# Patient Record
Sex: MALE | Race: WHITE | NOT HISPANIC OR LATINO | Employment: OTHER | URBAN - NONMETROPOLITAN AREA
[De-identification: names, ages, dates, MRNs, and addresses within clinical notes are randomized per-mention and may not be internally consistent; named-entity substitution may affect disease eponyms.]

---

## 2017-01-12 ENCOUNTER — OFFICE VISIT (OUTPATIENT)
Dept: PSYCHIATRY | Facility: CLINIC | Age: 47
End: 2017-01-12

## 2017-01-12 VITALS
WEIGHT: 170 LBS | SYSTOLIC BLOOD PRESSURE: 138 MMHG | BODY MASS INDEX: 25.18 KG/M2 | HEIGHT: 69 IN | DIASTOLIC BLOOD PRESSURE: 91 MMHG | HEART RATE: 55 BPM

## 2017-01-12 DIAGNOSIS — F33.1 MAJOR DEPRESSIVE DISORDER, RECURRENT EPISODE, MODERATE (HCC): Primary | ICD-10-CM

## 2017-01-12 DIAGNOSIS — F43.10 POST TRAUMATIC STRESS DISORDER (PTSD): ICD-10-CM

## 2017-01-12 PROCEDURE — 99213 OFFICE O/P EST LOW 20 MIN: CPT | Performed by: NURSE PRACTITIONER

## 2017-01-12 RX ORDER — PRAZOSIN HYDROCHLORIDE 2 MG/1
2 CAPSULE ORAL NIGHTLY
Qty: 30 CAPSULE | Refills: 1 | Status: SHIPPED | OUTPATIENT
Start: 2017-01-12 | End: 2017-02-16 | Stop reason: SDUPTHER

## 2017-01-12 RX ORDER — OXCARBAZEPINE 300 MG/1
300 TABLET, FILM COATED ORAL 2 TIMES DAILY
Refills: 6 | COMMUNITY
Start: 2016-12-13 | End: 2017-04-04 | Stop reason: SDUPTHER

## 2017-01-12 RX ORDER — BUSPIRONE HYDROCHLORIDE 10 MG/1
10 TABLET ORAL 3 TIMES DAILY
Qty: 60 TABLET | Refills: 0 | Status: SHIPPED | OUTPATIENT
Start: 2017-01-12 | End: 2017-02-16 | Stop reason: SDUPTHER

## 2017-01-12 RX ORDER — OLANZAPINE 20 MG/1
20 TABLET ORAL NIGHTLY
Qty: 30 TABLET | Refills: 1 | Status: SHIPPED | OUTPATIENT
Start: 2017-01-12 | End: 2017-02-16 | Stop reason: SDUPTHER

## 2017-01-12 RX ORDER — MIRTAZAPINE 15 MG/1
15 TABLET, FILM COATED ORAL NIGHTLY
Qty: 30 TABLET | Refills: 1 | Status: SHIPPED | OUTPATIENT
Start: 2017-01-12 | End: 2017-02-16 | Stop reason: SDUPTHER

## 2017-01-12 RX ORDER — FLUOXETINE HYDROCHLORIDE 20 MG/1
40 CAPSULE ORAL DAILY
Qty: 60 CAPSULE | Refills: 1 | Status: SHIPPED | OUTPATIENT
Start: 2017-01-12 | End: 2017-02-16 | Stop reason: SDUPTHER

## 2017-01-12 NOTE — PROGRESS NOTES
Subjective   Deni Lopez is a 46 y.o. male who is here today for medication management follow up.    Chief Complaint:   HPI:  History of Present Illness Patient comes to the office today was dressed appropriate for the season, appropriately groomed. Patient is denying any recent severe symptoms regarding mood.   The patient does reports a history of depressive symptoms including depressed mood, crying spells, insomnia, feelings of guilt, feelings of hopelessness, feelings of helplessness, feelings of worthlessness, death wish and increased thoughts of death.  He has had worsening anxiety and some depression.  Depression currently rated 6 /10 with 10 being the worst.    Patient reports that medication has been helpful.  Patient denies any medication related side effects.  Patient reports that appetite is adequate.  Patient denies any severe anhedonia.  Patient denies any hopelessness.  Patient denies any thoughts to harm self or others.The patient reports concerning symptoms of PTSD including: exposure to traumatic event, learning that traumatic event occurred to close family/friend, intrusive memories of the traumatic event, nightmares, flashbacks, intense distress related to reminders of the event and avoiding reminders of the event. Symptoms have been present for approximately 5   years(s) and have led to significant impairment in important areas of functioning.   The patient has occasional symptoms of  anxiety: constant anxiety/worry, restlessness/on edge, difficulty concentrating, mind goes blank, irritability, muscle tension and sleep disturbance.   The patient reports the following symptoms of sleep disturbance: difficulty falling asleep, frequent awakenings, early morning awakening, nightmares, night terrors.  The patients reports sleeping approximately 4 hours per night.      The following portions of the patient's history were reviewed and updated as appropriate: allergies, current medications, past family  "history, past medical history, past social history, past surgical history and problem list.    Review of Systems  Patient denies any recent medical illnesses.  She reports he was recently hospitalized for seizure follow-up and his medication has been changed from Dilantin to Tegretol.  No acute cardiopulmonary symptoms evident.  No acute gastrointestinal complaints.  Patient's appetite and intake are adequate.  Patient's current weight compared with last weight.    Objective   Physical Exam  Blood pressure 138/91, pulse 55, height 69\" (175.3 cm), weight 170 lb (77.1 kg).    Allergies   Allergen Reactions   • Codeine Sulfate Hives       Current Medications:   Current Outpatient Prescriptions   Medication Sig Dispense Refill   • cloNIDine (CATAPRES) 0.1 MG tablet      • FLUoxetine (PROzac) 20 MG capsule Take one tablet for 2 weeks then increase to 2 tablets daily 60 capsule 0   • metoprolol tartrate (LOPRESSOR) 25 MG tablet      • mirtazapine (REMERON) 15 MG tablet Take 1 tablet by mouth Every Night. 30 tablet 1   • OLANZapine (zyPREXA) 20 MG tablet Take 1 tablet by mouth Every Night. 30 tablet 1   • pantoprazole (PROTONIX) 40 MG EC tablet      • phenytoin (DILANTIN) 100 MG ER capsule      • prazosin (MINIPRESS) 2 MG capsule Take 1 capsule by mouth Every Night. 30 capsule 1     No current facility-administered medications for this visit.        Mental Status Exam:   Hygiene:   fair  Cooperation:  Cooperative  Eye Contact:  Good  Psychomotor Behavior:  Appropriate  Affect:  Appropriate  Hopelessness: Denies  Speech:  Normal  Thought Process:  Goal directed and Linear  Thought Content:  Normal  Suicidal:  None  Homicidal:  None  Hallucinations:  None  Delusion:  None  Memory:  Intact  Orientation:  Person, Place, Time and Situation  Reliability:  fair  Insight:  Fair  Judgement:  Fair  Impulse Control:  Fair  Physical/Medical Issues:  No     Assessment/Plan   Diagnoses and all orders for this visit:    Major depressive " disorder, recurrent episode, moderate    Post traumatic stress disorder (PTSD)    Other orders  -     FLUoxetine (PROzac) 20 MG capsule; Take 2 capsules by mouth Daily.  -     mirtazapine (REMERON) 15 MG tablet; Take 1 tablet by mouth Every Night.  -     OLANZapine (zyPREXA) 20 MG tablet; Take 1 tablet by mouth Every Night.  -     prazosin (MINIPRESS) 2 MG capsule; Take 1 capsule by mouth Every Night.  -     busPIRone (BUSPAR) 10 MG tablet; Take 1 tablet by mouth 3 (Three) Times a Day. May take additional tablet at noon if needed      · Will continue to treat and add buspar for anxeity management.  Will request records from recent hospitalization.      We discussed risks, benefits, and side effects of the above medication and the patient was agreeable with the plan.    Return in about 2 months (around 3/12/2017).  The patient was instructed to call clinic as needed or go to ER if in crisis.

## 2017-01-12 NOTE — MR AVS SNAPSHOT
Deni Lopez   1/12/2017 8:30 AM   Office Visit    Dept Phone:  [phone]   Encounter #:  [id number]    Provider:  ROSALINE Maurice   Department:  [facility]                Your Full Care Plan              Today's Medication Changes          These changes are accurate as of: 1/12/17  9:14 AM.  If you have any questions, ask your nurse or doctor.               New Medication(s)Ordered:     busPIRone 10 MG tablet   Commonly known as:  BUSPAR   Take 1 tablet by mouth 3 (Three) Times a Day. May take additional tablet at noon if needed         Medication(s)that have changed:     FLUoxetine 20 MG capsule   Commonly known as:  PROzac   Take 2 capsules by mouth Daily.   What changed:    - how much to take  - how to take this  - when to take this  - additional instructions            Where to Get Your Medications      These medications were sent to [facility], [address] - [phone]  - [phone] [address] [address]     Phone:  [phone]     busPIRone 10 MG tablet    FLUoxetine 20 MG capsule    mirtazapine 15 MG tablet    OLANZapine 20 MG tablet    prazosin 2 MG capsule                  Your Updated Medication List          This list is accurate as of: 1/12/17  9:14 AM.  Always use your most recent med list.                busPIRone 10 MG tablet   Commonly known as:  BUSPAR   Take 1 tablet by mouth 3 (Three) Times a Day. May take additional tablet at noon if needed       CloNIDine 0.1 MG tablet   Commonly known as:  CATAPRES       FLUoxetine 20 MG capsule   Commonly known as:  PROzac   Take 2 capsules by mouth Daily.       metoprolol tartrate 25 MG tablet   Commonly known as:  LOPRESSOR       mirtazapine 15 MG tablet   Commonly known as:  REMERON   Take 1 tablet by mouth Every Night.       OLANZapine 20 MG tablet   Commonly known as:  zyPREXA   Take 1  tablet by mouth Every Night.       OXcarbazepine 300 MG tablet   Commonly known as:  TRILEPTAL       pantoprazole 40 MG EC tablet   Commonly known as:  PROTONIX       phenytoin 100 MG ER capsule   Commonly known as:  DILANTIN       prazosin 2 MG capsule   Commonly known as:  MINIPRESS   Take 1 capsule by mouth Every Night.               You Were Diagnosed With        Codes Comments    Major depressive disorder, recurrent episode, moderate    -  Primary ICD-10-CM: F33.1  ICD-9-CM: 296.32     Post traumatic stress disorder (PTSD)     ICD-10-CM: F43.10  ICD-9-CM: 309.81       Instructions     None    Patient Instructions History      Upcoming Appointments     Visit Type Date Time Department    MEDICINE CHECK 2017  8:30 AM [facility]    MEDICINE CHECK 3/9/2017  9:50 AM [facility]      Ticket [employer] Signup     [facility] [employer] allows you to send messages to your doctor, view your test results, renew your prescriptions, schedule appointments, and more. To sign up, go to [employer] and click on the Sign Up Now link in the New User? box. Enter your [employer] Activation Code exactly as it appears below along with the last four digits of your Social Security Number and your Date of Birth () to complete the sign-up process. If you do not sign up before the expiration date, you must request a new code.    [employer] Activation Code: [email or web address]  Expires: 2017  9:14 AM    If you have questions, you can email [email or web address] or call [phone] to talk to our [employer] staff. Remember, [employer] is NOT to be used for urgent needs. For medical emergencies, dial 911.               Other Info from Your Visit           Your Appointments     Mar 09, 2017  9:50 AM EST   Medicine Check with ROSALINE Maurice   [facility] (--)    [address] [facility] [address]   [phone]              Allergies     Codeine Sulfate  Hives      Vital Signs      "Blood Pressure Pulse Height Weight Body Mass Index Smoking Status    138/91 55 69\" (175.3 cm) 170 lb (77.1 kg) 25.1 kg/m2 Current Every Day Smoker      Problems and Diagnoses Noted     Mood problem    -  Primary    Post traumatic stress disorder (PTSD)            "

## 2017-02-16 RX ORDER — PRAZOSIN HYDROCHLORIDE 2 MG/1
2 CAPSULE ORAL NIGHTLY
Qty: 30 CAPSULE | Refills: 1 | Status: SHIPPED | OUTPATIENT
Start: 2017-02-16 | End: 2017-03-09 | Stop reason: SDUPTHER

## 2017-02-16 RX ORDER — MIRTAZAPINE 15 MG/1
15 TABLET, FILM COATED ORAL NIGHTLY
Qty: 30 TABLET | Refills: 1 | Status: SHIPPED | OUTPATIENT
Start: 2017-02-16 | End: 2017-03-09 | Stop reason: SDUPTHER

## 2017-02-16 RX ORDER — FLUOXETINE HYDROCHLORIDE 20 MG/1
40 CAPSULE ORAL DAILY
Qty: 60 CAPSULE | Refills: 1 | Status: SHIPPED | OUTPATIENT
Start: 2017-02-16 | End: 2017-03-09 | Stop reason: SDUPTHER

## 2017-02-16 RX ORDER — BUSPIRONE HYDROCHLORIDE 10 MG/1
10 TABLET ORAL 3 TIMES DAILY
Qty: 60 TABLET | Refills: 0 | Status: SHIPPED | OUTPATIENT
Start: 2017-02-16 | End: 2017-03-09 | Stop reason: SDUPTHER

## 2017-02-16 RX ORDER — OLANZAPINE 20 MG/1
20 TABLET ORAL NIGHTLY
Qty: 30 TABLET | Refills: 1 | Status: SHIPPED | OUTPATIENT
Start: 2017-02-16 | End: 2017-03-09 | Stop reason: SDUPTHER

## 2017-03-09 ENCOUNTER — OFFICE VISIT (OUTPATIENT)
Dept: PSYCHIATRY | Facility: CLINIC | Age: 47
End: 2017-03-09

## 2017-03-09 VITALS
DIASTOLIC BLOOD PRESSURE: 98 MMHG | HEART RATE: 55 BPM | BODY MASS INDEX: 25.92 KG/M2 | SYSTOLIC BLOOD PRESSURE: 137 MMHG | WEIGHT: 175 LBS | HEIGHT: 69 IN

## 2017-03-09 DIAGNOSIS — F43.10 POST TRAUMATIC STRESS DISORDER (PTSD): ICD-10-CM

## 2017-03-09 DIAGNOSIS — F33.1 MAJOR DEPRESSIVE DISORDER, RECURRENT EPISODE, MODERATE (HCC): Primary | ICD-10-CM

## 2017-03-09 PROCEDURE — 99213 OFFICE O/P EST LOW 20 MIN: CPT | Performed by: NURSE PRACTITIONER

## 2017-03-09 RX ORDER — BUSPIRONE HYDROCHLORIDE 15 MG/1
15 TABLET ORAL 3 TIMES DAILY
Qty: 90 TABLET | Refills: 0 | Status: SHIPPED | OUTPATIENT
Start: 2017-03-09 | End: 2017-04-04 | Stop reason: SDUPTHER

## 2017-03-09 RX ORDER — OLANZAPINE 20 MG/1
20 TABLET ORAL NIGHTLY
Qty: 30 TABLET | Refills: 0 | Status: SHIPPED | OUTPATIENT
Start: 2017-03-09 | End: 2017-04-04 | Stop reason: SDUPTHER

## 2017-03-09 RX ORDER — FLUOXETINE HYDROCHLORIDE 20 MG/1
60 CAPSULE ORAL DAILY
Qty: 90 CAPSULE | Refills: 0 | Status: SHIPPED | OUTPATIENT
Start: 2017-03-09 | End: 2017-04-04

## 2017-03-09 RX ORDER — BUPROPION HYDROCHLORIDE 75 MG/1
TABLET ORAL
Refills: 2 | COMMUNITY
Start: 2017-02-16 | End: 2017-03-09

## 2017-03-09 RX ORDER — PRAZOSIN HYDROCHLORIDE 2 MG/1
2 CAPSULE ORAL NIGHTLY
Qty: 30 CAPSULE | Refills: 0 | Status: SHIPPED | OUTPATIENT
Start: 2017-03-09 | End: 2017-04-04 | Stop reason: SDUPTHER

## 2017-03-09 RX ORDER — MIRTAZAPINE 15 MG/1
15 TABLET, FILM COATED ORAL NIGHTLY
Qty: 30 TABLET | Refills: 0 | Status: SHIPPED | OUTPATIENT
Start: 2017-03-09 | End: 2017-04-04 | Stop reason: SDUPTHER

## 2017-03-09 NOTE — PROGRESS NOTES
Subjective   Deni Lopez is a 46 y.o. male who is here today for medication management follow up.    Chief Complaint:   HPI:  History of Present Illness   Patient comes to the office today was dressed appropriate for the season, appropriately groomed. Patient is reporting great difficulty with worsening depression and feelings of impending doom.  The patient does reports a history of depressive symptoms including depressed mood, crying spells, insomnia, feelings of guilt, feelings of hopelessness, feelings of helplessness, feelings of worthlessness, death wish and increased thoughts of death.  He has had worsening anxiety and some depression.  Depression currently rated 8 /10 with 10 being the worst.    Patient reports that medication has been helpful.  Patient denies any medication related side effects.  Patient reports that appetite is adequate.  Patient denies any severe anhedonia.  Patient denies any hopelessness.  Patient denies any thoughts to harm self or others.The patient reports concerning symptoms of PTSD including: exposure to traumatic event, learning that traumatic event occurred to close family/friend, intrusive memories of the traumatic event, nightmares, flashbacks, intense distress related to reminders of the event and avoiding reminders of the event. Symptoms have been present for approximately 5 years(s) and have led to significant impairment in important areas of functioning.   The patient has occasional symptoms of  anxiety: constant anxiety/worry, restlessness/on edge, difficulty concentrating, mind goes blank, irritability, muscle tension and sleep disturbance.   The patient reports the following symptoms of sleep disturbance: difficulty falling asleep, frequent awakenings, early morning awakening, nightmares, night terrors.  The patients reports sleeping approximately 6 hours per night.      The following portions of the patient's history were reviewed and updated as appropriate: allergies,  "current medications, past family history, past medical history, past social history, past surgical history and problem list.    Review of Systems  Patient denies any recent medical illnesses.  She reports he was recently hospitalized for seizure follow-up and his medication has been changed from Dilantin to Tegretol.  No acute cardiopulmonary symptoms evident.  No acute gastrointestinal complaints.  Patient's appetite and intake are adequate.  Patient's current weight compared with last weight.    Objective   Physical Exam  Blood pressure 137/98, pulse 55, height 69\" (175.3 cm), weight 175 lb (79.4 kg).    Allergies   Allergen Reactions   • Codeine Sulfate Hives       Current Medications:   Current Outpatient Prescriptions   Medication Sig Dispense Refill   • busPIRone (BUSPAR) 10 MG tablet Take 1 tablet by mouth 3 (Three) Times a Day. May take additional tablet at noon if needed 60 tablet 0   • cloNIDine (CATAPRES) 0.1 MG tablet      • FLUoxetine (PROzac) 20 MG capsule Take 2 capsules by mouth Daily. 60 capsule 1   • metoprolol tartrate (LOPRESSOR) 25 MG tablet 2 (Two) Times a Day.     • mirtazapine (REMERON) 15 MG tablet Take 1 tablet by mouth Every Night. 30 tablet 1   • OLANZapine (zyPREXA) 20 MG tablet Take 1 tablet by mouth Every Night. 30 tablet 1   • OXcarbazepine (TRILEPTAL) 300 MG tablet 300 mg 2 (Two) Times a Day.  6   • pantoprazole (PROTONIX) 40 MG EC tablet Daily.     • prazosin (MINIPRESS) 2 MG capsule Take 1 capsule by mouth Every Night. 30 capsule 1   • buPROPion (WELLBUTRIN) 75 MG tablet   2     No current facility-administered medications for this visit.        Mental Status Exam:   Hygiene:   fair  Cooperation:  Cooperative  Eye Contact:  Good  Psychomotor Behavior:  Appropriate  Affect:  Appropriate  Hopelessness: Denies  Speech:  Normal  Thought Process:  Goal directed and Linear  Thought Content:  Normal  Suicidal:  None  Homicidal:  None  Hallucinations:  None  Delusion:  None  Memory:  " Intact  Orientation:  Person, Place, Time and Situation  Reliability:  fair  Insight:  Fair  Judgement:  Fair  Impulse Control:  Fair  Physical/Medical Issues:  No     Assessment/Plan   Diagnoses and all orders for this visit:    Major depressive disorder, recurrent episode, moderate    Post traumatic stress disorder (PTSD)    Other orders  -     FLUoxetine (PROzac) 20 MG capsule; Take 3 capsules by mouth Daily.  -     busPIRone (BUSPAR) 15 MG tablet; Take 1 tablet by mouth 3 (Three) Times a Day.  -     OLANZapine (zyPREXA) 20 MG tablet; Take 1 tablet by mouth Every Night.  -     prazosin (MINIPRESS) 2 MG capsule; Take 1 capsule by mouth Every Night.  -     mirtazapine (REMERON) 15 MG tablet; Take 1 tablet by mouth Every Night.        · Will continue to treat and add buspar for anxeity management.  Will request records from recent hospitalization.      We discussed risks, benefits, and side effects of the above medication and the patient was agreeable with the plan.    Return in about 4 weeks (around 4/6/2017).  The patient was instructed to call clinic as needed or go to ER if in crisis.

## 2017-04-04 ENCOUNTER — OFFICE VISIT (OUTPATIENT)
Dept: PSYCHIATRY | Facility: CLINIC | Age: 47
End: 2017-04-04

## 2017-04-04 VITALS
WEIGHT: 175 LBS | SYSTOLIC BLOOD PRESSURE: 147 MMHG | BODY MASS INDEX: 25.92 KG/M2 | DIASTOLIC BLOOD PRESSURE: 89 MMHG | HEIGHT: 69 IN | HEART RATE: 52 BPM

## 2017-04-04 DIAGNOSIS — F43.10 POST TRAUMATIC STRESS DISORDER (PTSD): ICD-10-CM

## 2017-04-04 DIAGNOSIS — F33.1 MAJOR DEPRESSIVE DISORDER, RECURRENT EPISODE, MODERATE (HCC): Primary | ICD-10-CM

## 2017-04-04 PROCEDURE — 99213 OFFICE O/P EST LOW 20 MIN: CPT | Performed by: NURSE PRACTITIONER

## 2017-04-04 RX ORDER — VENLAFAXINE HYDROCHLORIDE 37.5 MG/1
37.5 CAPSULE, EXTENDED RELEASE ORAL EVERY MORNING
Qty: 30 CAPSULE | Refills: 0 | Status: SHIPPED | OUTPATIENT
Start: 2017-04-04 | End: 2017-04-25 | Stop reason: SDUPTHER

## 2017-04-04 RX ORDER — PRAZOSIN HYDROCHLORIDE 2 MG/1
2 CAPSULE ORAL NIGHTLY
Qty: 30 CAPSULE | Refills: 0 | Status: SHIPPED | OUTPATIENT
Start: 2017-04-04 | End: 2017-05-16 | Stop reason: SDUPTHER

## 2017-04-04 RX ORDER — OLANZAPINE 20 MG/1
20 TABLET ORAL NIGHTLY
Qty: 30 TABLET | Refills: 0 | Status: SHIPPED | OUTPATIENT
Start: 2017-04-04 | End: 2017-05-16 | Stop reason: SDUPTHER

## 2017-04-04 RX ORDER — MIRTAZAPINE 30 MG/1
30 TABLET, FILM COATED ORAL NIGHTLY
Qty: 30 TABLET | Refills: 0 | Status: SHIPPED | OUTPATIENT
Start: 2017-04-04 | End: 2017-05-16 | Stop reason: SDUPTHER

## 2017-04-04 RX ORDER — OXCARBAZEPINE 300 MG/1
TABLET, FILM COATED ORAL
Qty: 90 TABLET | Refills: 0 | Status: SHIPPED | OUTPATIENT
Start: 2017-04-04 | End: 2017-04-25 | Stop reason: SDUPTHER

## 2017-04-04 RX ORDER — BUSPIRONE HYDROCHLORIDE 15 MG/1
15 TABLET ORAL 3 TIMES DAILY
Qty: 90 TABLET | Refills: 0 | Status: SHIPPED | OUTPATIENT
Start: 2017-04-04 | End: 2017-05-16 | Stop reason: SDUPTHER

## 2017-04-04 NOTE — PROGRESS NOTES
"Subjective   Deni Lopez is a 46 y.o. male who is here today for medication management follow up.    Chief Complaint: \"I'm really down\"    HPI: History of Present Illness Pateint hema with worsening depression.  He has significant loss of energy and motivation.  He is withdrawn and has poor energy and motivation.  He is having increaseThe patient reports depressive symptoms including depressed mood, insomnia, decreased appetite, feelings of guilt, feelings of hopelessness, feelings of helplessness, feelings of worthlessness, low energy, difficulty concentrating, death wish and increased thoughts of death, and have caused impairment in important areas of functioning.  Depression rated 9/10 with 10 being the worst.  The patient did consistently and adamantly denying any suicidal intent or planning.  Patient states he would never harm himself because of his children.  Patient does report difficulty with loss of interest he is having significant loss of motivation with his usual activities including activities of daily living.  He is experiencing ongoing symptoms of PTSD including avoidance of triggers hypersensitivity to stimuli nightmares and flashbacks.  He denies any substance related problems he denies any psychotic phenomenon but does report some feelings of paranoia and feelings of impending doom.    The following portions of the patient's history were reviewed and updated as appropriate: allergies, current medications, past family history, past medical history, past social history, past surgical history and problem list.    Review of Systems  Patient denies any recent medical illnesses.  No acute cardiopulmonary symptoms evident.  No acute gastrointestinal complaints.  Patient's appetite and intake are adequate.  Patient's current weight compared with last weight.    Objective   Physical Exam  Blood pressure 147/89, pulse 52, height 69\" (175.3 cm), weight 175 lb (79.4 kg).    Allergies   Allergen Reactions "   • Codeine Sulfate Hives       Current Medications:   Current Outpatient Prescriptions   Medication Sig Dispense Refill   • busPIRone (BUSPAR) 15 MG tablet Take 1 tablet by mouth 3 (Three) Times a Day. 90 tablet 0   • cloNIDine (CATAPRES) 0.1 MG tablet      • FLUoxetine (PROzac) 20 MG capsule Take 3 capsules by mouth Daily. 90 capsule 0   • metoprolol tartrate (LOPRESSOR) 25 MG tablet 2 (Two) Times a Day.     • mirtazapine (REMERON) 15 MG tablet Take 1 tablet by mouth Every Night. 30 tablet 0   • OLANZapine (zyPREXA) 20 MG tablet Take 1 tablet by mouth Every Night. 30 tablet 0   • OXcarbazepine (TRILEPTAL) 300 MG tablet 300 mg 2 (Two) Times a Day.  6   • pantoprazole (PROTONIX) 40 MG EC tablet Daily.     • prazosin (MINIPRESS) 2 MG capsule Take 1 capsule by mouth Every Night. 30 capsule 0     No current facility-administered medications for this visit.        Mental Status Exam:   Hygiene:   fair  Cooperation:  Cooperative  Eye Contact:  Good  Psychomotor Behavior:  Appropriate  Affect:  Full range  Hopelessness: Denies  Speech:  Normal  Thought Process:  Goal directed and Linear  Thought Content:  Mood congurent  Suicidal:  None  Homicidal:  None  Hallucinations:  None  Delusion:  None  Memory:  Intact  Orientation:  Person, Place, Time and Situation  Reliability:  good  Insight:  Good  Judgement:  Good  Impulse Control:  Good  Physical/Medical Issues:  Yes hpt    Assessment/Plan   Diagnoses and all orders for this visit:    Major depressive disorder, recurrent episode, moderate    Post traumatic stress disorder (PTSD)    Other orders  -     venlafaxine XR (EFFEXOR XR) 37.5 MG 24 hr capsule; Take 1 capsule by mouth Every Morning.  -     OLANZapine (zyPREXA) 20 MG tablet; Take 1 tablet by mouth Every Night.  -     OXcarbazepine (TRILEPTAL) 300 MG tablet; Take one 1 in am and 2 in pm.  -     busPIRone (BUSPAR) 15 MG tablet; Take 1 tablet by mouth 3 (Three) Times a Day.  -     prazosin (MINIPRESS) 2 MG capsule; Take  1 capsule by mouth Every Night.  -     mirtazapine (REMERON) 30 MG tablet; Take 1 tablet by mouth Every Night.        · Patient does appear to be experiencing severe depression with loss of energy and motivation we'll switch anti-depression on a tapering basis patient was instructed to decrease Prozac to 40 mg for the next week and then 20 mg for the next week then every other day with 20 mg for a week then discontinue.  Patient's Trileptal will be slightly increased as will his Remeron for antidepressive effects.  Patient does appear to be greatly impacted by his depressive symptoms.  He was reminded on several occasions to immediately come to the hospital should he have any thoughts to harm himself or others will assess labs values patient has not had any for over a year to assess for thyroid issues.    We discussed risks, benefits, and side effects of the above medication and the patient was agreeable with the plan.    Return in about 3 weeks (around 4/25/2017).  The patient was instructed to call clinic as needed or go to ER if in crisis.  Patient was instructed to immediately call 911 or come to the nearest emergency room for thoughts to harm self or others.

## 2017-04-25 ENCOUNTER — OFFICE VISIT (OUTPATIENT)
Dept: PSYCHIATRY | Facility: CLINIC | Age: 47
End: 2017-04-25

## 2017-04-25 VITALS
BODY MASS INDEX: 26.07 KG/M2 | HEIGHT: 69 IN | DIASTOLIC BLOOD PRESSURE: 116 MMHG | WEIGHT: 176 LBS | HEART RATE: 54 BPM | SYSTOLIC BLOOD PRESSURE: 174 MMHG

## 2017-04-25 DIAGNOSIS — F33.3 SEVERE EPISODE OF RECURRENT MAJOR DEPRESSIVE DISORDER, WITH PSYCHOTIC FEATURES (HCC): Primary | ICD-10-CM

## 2017-04-25 DIAGNOSIS — F43.10 POST TRAUMATIC STRESS DISORDER (PTSD): ICD-10-CM

## 2017-04-25 PROCEDURE — 99213 OFFICE O/P EST LOW 20 MIN: CPT | Performed by: NURSE PRACTITIONER

## 2017-04-25 RX ORDER — VENLAFAXINE HYDROCHLORIDE 75 MG/1
75 CAPSULE, EXTENDED RELEASE ORAL EVERY MORNING
Qty: 30 CAPSULE | Refills: 0 | Status: SHIPPED | OUTPATIENT
Start: 2017-04-25 | End: 2017-05-16 | Stop reason: SDUPTHER

## 2017-04-25 RX ORDER — OXCARBAZEPINE 600 MG/1
600 TABLET, FILM COATED ORAL 2 TIMES DAILY
Qty: 60 TABLET | Refills: 0 | Status: SHIPPED | OUTPATIENT
Start: 2017-04-25 | End: 2017-05-16 | Stop reason: SDUPTHER

## 2017-04-25 RX ORDER — LORAZEPAM 0.5 MG/1
0.5 TABLET ORAL 2 TIMES DAILY PRN
Qty: 30 TABLET | Refills: 0 | Status: SHIPPED | OUTPATIENT
Start: 2017-04-25 | End: 2017-05-16 | Stop reason: SDUPTHER

## 2017-04-25 NOTE — PROGRESS NOTES
"Subjective   Deni Lopez is a 46 y.o. male who is here today for medication management follow up.    Chief Complaint: \"My depression is really bad\".    HPI: History of Present Illness Patient presets with worsening depression.  He has significant loss of energy and motivation.  He is withdrawn and has poor energy and motivation.  He is having increased depression ongoing worsening feeling like I don't want to kill myself.  He reports severe anhedonia.    The patient reports depressive symptoms including depressed mood, insomnia, decreased appetite, feelings of guilt, feelings of hopelessness, feelings of helplessness, feelings of worthlessness, low energy, difficulty concentrating, death wish and increased thoughts of death, and have caused impairment in important areas of functioning.  Depression rated 10/10 with 10 being the worst.  The patient did consistently and adamantly denying any suicidal intent or planning.  Patient states he would never harm himself because of his children.  Patient does report difficulty with loss of interest he is having significant loss of motivation with his usual activities including activities of daily living.  He is experiencing ongoing symptoms of PTSD including avoidance of triggers hypersensitivity to stimuli nightmares and flashbacks.  He denies any substance related problems he denies any psychotic phenomenon but does report some feelings of paranoia and feelings of impending doom.    The following portions of the patient's history were reviewed and updated as appropriate: allergies, current medications, past family history, past medical history, past social history, past surgical history and problem list.    Review of Systems  Patient denies any recent medical illnesses.  No acute cardiopulmonary symptoms evident.  No acute gastrointestinal complaints.  Patient's appetite and intake are adequate.  Patient's current weight compared with last weight.    Objective   Physical " "Exam  Blood pressure (!) 175/105, pulse 53, height 69\" (175.3 cm), weight 176 lb (79.8 kg).    Allergies   Allergen Reactions   • Codeine Sulfate Hives       Current Medications:   Current Outpatient Prescriptions   Medication Sig Dispense Refill   • busPIRone (BUSPAR) 15 MG tablet Take 1 tablet by mouth 3 (Three) Times a Day. 90 tablet 0   • cloNIDine (CATAPRES) 0.1 MG tablet      • metoprolol tartrate (LOPRESSOR) 25 MG tablet 2 (Two) Times a Day.     • mirtazapine (REMERON) 30 MG tablet Take 1 tablet by mouth Every Night. 30 tablet 0   • OLANZapine (zyPREXA) 20 MG tablet Take 1 tablet by mouth Every Night. 30 tablet 0   • OXcarbazepine (TRILEPTAL) 300 MG tablet Take one 1 in am and 2 in pm. 90 tablet 0   • pantoprazole (PROTONIX) 40 MG EC tablet Daily.     • prazosin (MINIPRESS) 2 MG capsule Take 1 capsule by mouth Every Night. 30 capsule 0   • venlafaxine XR (EFFEXOR XR) 37.5 MG 24 hr capsule Take 1 capsule by mouth Every Morning. 30 capsule 0     No current facility-administered medications for this visit.        Mental Status Exam:   Hygiene:   fair  Cooperation:  Cooperative  Eye Contact:  Good  Psychomotor Behavior:  Appropriate  Affect:  Full range  Hopelessness: Denies  Speech:  Normal  Thought Process:  Goal directed and Linear  Thought Content:  Mood congurent  Suicidal:  None  Homicidal:  None  Hallucinations:  None  Delusion:  None  Memory:  Intact  Orientation:  Person, Place, Time and Situation  Reliability:  good  Insight:  Good  Judgement:  Good  Impulse Control:  Good  Physical/Medical Issues:  Yes hpt    Assessment/Plan   Diagnoses and all orders for this visit:    Major depressive disorder, recurrent episode, moderate    Post traumatic stress disorder (PTSD)    Other orders  -     OXcarbazepine (TRILEPTAL) 600 MG tablet; Take 1 tablet by mouth 2 (Two) Times a Day.  -     venlafaxine XR (EFFEXOR-XR) 75 MG 24 hr capsule; Take 1 capsule by mouth Every Morning.  -     LORazepam (ATIVAN) 0.5 MG " tablet; Take 1 tablet by mouth 2 (Two) Times a Day As Needed for Anxiety.        Patient does appear to be experiencing severe depression with loss of energy and motivation we'll continue on a tapering basis patient was instructed to continue medication.  Patient's Trileptal will be slightly increased as will his Remeron for antidepressive effects.  Patient does appear to be greatly impacted by his depressive symptoms.  He was reminded on several occasions to immediately come to the hospital should he have any thoughts to harm himself or others   We discussed risks, benefits, and side effects of the above medication and the patient was agreeable with the plan.    Return in about 3 weeks (around 5/16/2017).  The patient was instructed to call clinic as needed or go to ER if in crisis.  Patient was instructed to immediately call 911 or come to the nearest emergency room for thoughts to harm self or others.

## 2017-05-16 RX ORDER — VENLAFAXINE HYDROCHLORIDE 75 MG/1
75 CAPSULE, EXTENDED RELEASE ORAL EVERY MORNING
Qty: 30 CAPSULE | Refills: 0 | Status: SHIPPED | OUTPATIENT
Start: 2017-05-16 | End: 2017-05-30 | Stop reason: SDUPTHER

## 2017-05-16 RX ORDER — BUSPIRONE HYDROCHLORIDE 15 MG/1
15 TABLET ORAL 3 TIMES DAILY
Qty: 90 TABLET | Refills: 0 | Status: SHIPPED | OUTPATIENT
Start: 2017-05-16 | End: 2017-05-30 | Stop reason: SDUPTHER

## 2017-05-16 RX ORDER — LORAZEPAM 0.5 MG/1
0.5 TABLET ORAL 2 TIMES DAILY PRN
Qty: 30 TABLET | Refills: 0 | Status: SHIPPED | OUTPATIENT
Start: 2017-05-16 | End: 2017-08-29 | Stop reason: ALTCHOICE

## 2017-05-16 RX ORDER — OLANZAPINE 20 MG/1
20 TABLET ORAL NIGHTLY
Qty: 30 TABLET | Refills: 0 | Status: SHIPPED | OUTPATIENT
Start: 2017-05-16 | End: 2017-05-30 | Stop reason: SDUPTHER

## 2017-05-16 RX ORDER — MIRTAZAPINE 30 MG/1
30 TABLET, FILM COATED ORAL NIGHTLY
Qty: 30 TABLET | Refills: 0 | Status: SHIPPED | OUTPATIENT
Start: 2017-05-16 | End: 2017-05-30 | Stop reason: SDUPTHER

## 2017-05-16 RX ORDER — PRAZOSIN HYDROCHLORIDE 2 MG/1
2 CAPSULE ORAL NIGHTLY
Qty: 30 CAPSULE | Refills: 0 | Status: SHIPPED | OUTPATIENT
Start: 2017-05-16 | End: 2017-05-30 | Stop reason: SDUPTHER

## 2017-05-16 RX ORDER — OXCARBAZEPINE 600 MG/1
600 TABLET, FILM COATED ORAL 2 TIMES DAILY
Qty: 60 TABLET | Refills: 0 | Status: SHIPPED | OUTPATIENT
Start: 2017-05-16 | End: 2017-05-30 | Stop reason: SDUPTHER

## 2017-05-30 ENCOUNTER — OFFICE VISIT (OUTPATIENT)
Dept: PSYCHIATRY | Facility: CLINIC | Age: 47
End: 2017-05-30

## 2017-05-30 VITALS
HEIGHT: 69 IN | DIASTOLIC BLOOD PRESSURE: 102 MMHG | WEIGHT: 173 LBS | HEART RATE: 58 BPM | BODY MASS INDEX: 25.62 KG/M2 | SYSTOLIC BLOOD PRESSURE: 152 MMHG

## 2017-05-30 DIAGNOSIS — F43.10 POST TRAUMATIC STRESS DISORDER (PTSD): ICD-10-CM

## 2017-05-30 DIAGNOSIS — F33.3 SEVERE EPISODE OF RECURRENT MAJOR DEPRESSIVE DISORDER, WITH PSYCHOTIC FEATURES (HCC): Primary | ICD-10-CM

## 2017-05-30 PROCEDURE — 99213 OFFICE O/P EST LOW 20 MIN: CPT | Performed by: NURSE PRACTITIONER

## 2017-05-30 RX ORDER — BUSPIRONE HYDROCHLORIDE 15 MG/1
15 TABLET ORAL 4 TIMES DAILY
Qty: 120 TABLET | Refills: 1 | Status: SHIPPED | OUTPATIENT
Start: 2017-05-30 | End: 2017-07-17 | Stop reason: SDUPTHER

## 2017-05-30 RX ORDER — PRAZOSIN HYDROCHLORIDE 2 MG/1
2 CAPSULE ORAL NIGHTLY
Qty: 30 CAPSULE | Refills: 1 | Status: SHIPPED | OUTPATIENT
Start: 2017-05-30 | End: 2017-08-29 | Stop reason: SDUPTHER

## 2017-05-30 RX ORDER — VENLAFAXINE HYDROCHLORIDE 37.5 MG/1
CAPSULE, EXTENDED RELEASE ORAL
Refills: 1 | COMMUNITY
Start: 2017-04-27 | End: 2017-05-30 | Stop reason: DRUGHIGH

## 2017-05-30 RX ORDER — OLANZAPINE 20 MG/1
20 TABLET ORAL NIGHTLY
Qty: 30 TABLET | Refills: 1 | Status: SHIPPED | OUTPATIENT
Start: 2017-05-30 | End: 2017-08-29 | Stop reason: SDUPTHER

## 2017-05-30 RX ORDER — IBUPROFEN 600 MG/1
TABLET ORAL
Refills: 0 | COMMUNITY
Start: 2017-05-16

## 2017-05-30 RX ORDER — VENLAFAXINE HYDROCHLORIDE 75 MG/1
75 CAPSULE, EXTENDED RELEASE ORAL EVERY MORNING
Qty: 30 CAPSULE | Refills: 1 | Status: SHIPPED | OUTPATIENT
Start: 2017-05-30 | End: 2017-07-20 | Stop reason: SDUPTHER

## 2017-05-30 RX ORDER — MIRTAZAPINE 30 MG/1
30 TABLET, FILM COATED ORAL NIGHTLY
Qty: 30 TABLET | Refills: 1 | Status: SHIPPED | OUTPATIENT
Start: 2017-05-30 | End: 2017-08-29 | Stop reason: SDUPTHER

## 2017-05-30 RX ORDER — OXCARBAZEPINE 600 MG/1
600 TABLET, FILM COATED ORAL 2 TIMES DAILY
Qty: 60 TABLET | Refills: 1 | Status: SHIPPED | OUTPATIENT
Start: 2017-05-30 | End: 2017-08-29 | Stop reason: SDUPTHER

## 2017-07-17 RX ORDER — BUSPIRONE HYDROCHLORIDE 15 MG/1
TABLET ORAL
Qty: 120 TABLET | Refills: 1 | Status: SHIPPED | OUTPATIENT
Start: 2017-07-17 | End: 2017-08-29 | Stop reason: SDUPTHER

## 2017-07-20 RX ORDER — VENLAFAXINE HYDROCHLORIDE 75 MG/1
75 CAPSULE, EXTENDED RELEASE ORAL EVERY MORNING
Qty: 30 CAPSULE | Refills: 1 | Status: SHIPPED | OUTPATIENT
Start: 2017-07-20 | End: 2017-08-29 | Stop reason: SDUPTHER

## 2017-08-29 ENCOUNTER — OFFICE VISIT (OUTPATIENT)
Dept: PSYCHIATRY | Facility: CLINIC | Age: 47
End: 2017-08-29

## 2017-08-29 VITALS
HEART RATE: 63 BPM | WEIGHT: 180 LBS | SYSTOLIC BLOOD PRESSURE: 140 MMHG | BODY MASS INDEX: 26.66 KG/M2 | DIASTOLIC BLOOD PRESSURE: 97 MMHG | HEIGHT: 69 IN

## 2017-08-29 DIAGNOSIS — F43.10 POST TRAUMATIC STRESS DISORDER (PTSD): ICD-10-CM

## 2017-08-29 DIAGNOSIS — F33.3 SEVERE EPISODE OF RECURRENT MAJOR DEPRESSIVE DISORDER, WITH PSYCHOTIC FEATURES (HCC): Primary | ICD-10-CM

## 2017-08-29 PROCEDURE — 99213 OFFICE O/P EST LOW 20 MIN: CPT | Performed by: NURSE PRACTITIONER

## 2017-08-29 RX ORDER — VENLAFAXINE HYDROCHLORIDE 75 MG/1
75 CAPSULE, EXTENDED RELEASE ORAL EVERY MORNING
Qty: 30 CAPSULE | Refills: 0 | Status: SHIPPED | OUTPATIENT
Start: 2017-08-29 | End: 2017-09-26

## 2017-08-29 RX ORDER — BUSPIRONE HYDROCHLORIDE 15 MG/1
15 TABLET ORAL 3 TIMES DAILY
Qty: 120 TABLET | Refills: 0 | Status: SHIPPED | OUTPATIENT
Start: 2017-08-29 | End: 2017-09-26 | Stop reason: SDUPTHER

## 2017-08-29 RX ORDER — PRAZOSIN HYDROCHLORIDE 1 MG/1
3 CAPSULE ORAL NIGHTLY
Qty: 30 CAPSULE | Refills: 0 | Status: SHIPPED | OUTPATIENT
Start: 2017-08-29 | End: 2017-09-08 | Stop reason: SDUPTHER

## 2017-08-29 RX ORDER — MIRTAZAPINE 45 MG/1
45 TABLET, FILM COATED ORAL NIGHTLY
Qty: 30 TABLET | Refills: 0 | Status: SHIPPED | OUTPATIENT
Start: 2017-08-29 | End: 2017-09-18 | Stop reason: SDUPTHER

## 2017-08-29 RX ORDER — PAROXETINE 10 MG/1
10 TABLET, FILM COATED ORAL DAILY
Qty: 42 TABLET | Refills: 0 | Status: SHIPPED | OUTPATIENT
Start: 2017-08-29 | End: 2017-09-26 | Stop reason: SDUPTHER

## 2017-08-29 RX ORDER — OXCARBAZEPINE 600 MG/1
600 TABLET, FILM COATED ORAL 2 TIMES DAILY
Qty: 60 TABLET | Refills: 0 | Status: SHIPPED | OUTPATIENT
Start: 2017-08-29 | End: 2017-09-26 | Stop reason: SDUPTHER

## 2017-08-29 RX ORDER — OLANZAPINE 20 MG/1
20 TABLET ORAL NIGHTLY
Qty: 30 TABLET | Refills: 0 | Status: SHIPPED | OUTPATIENT
Start: 2017-08-29 | End: 2017-09-26 | Stop reason: SDUPTHER

## 2017-08-29 NOTE — PROGRESS NOTES
"Subjective   Deni Lopez is a 47 y.o. male who is here today for medication management follow up.    Chief Complaint: \"My depression is really bad\".    HPI: History of Present Illness Patient presets with worsening depression. His motivation is improved.  He is having increased depression ongoing worsening feeling like he has 5-6 days a week of depression.  He reports severe anhedonia.    The patient reports depressive symptoms have improved including depressed mood, insomnia, decreased appetite, feelings of guilt, feelings of hopelessness, feelings of helplessness, feelings of worthlessness, low energy, difficulty concentrating, death wish and increased thoughts of death, and have caused impairment in important areas of functioning.  Depression rated 8/10 with 10 being the worst.  The patient did consistently and adamantly denying any suicidal intent or planning.  Patient states he would never harm himself because of his children.  Patient does report difficulty with loss of interest he is having significant loss of motivation.  He is experiencing ongoing symptoms of PTSD including avoidance of triggers hypersensitivity to stimuli nightmares and flashbacks.  He denies any substance related problems he denies any psychotic phenomenon but does report no current feelings of paranoia and feelings of impending doom.    The following portions of the patient's history were reviewed and updated as appropriate: allergies, current medications, past family history, past medical history, past social history, past surgical history and problem list.    Review of Systems  Patient denies any recent medical illnesses.  No acute cardiopulmonary symptoms evident.  No acute gastrointestinal complaints.  Patient's appetite and intake are adequate.  Patient's current weight compared with last weight.    Objective   Physical Exam  Blood pressure 140/97, pulse 63, height 69\" (175.3 cm), weight 180 lb (81.6 kg).    Allergies   Allergen " Reactions   • Codeine Sulfate Hives       Current Medications:   Current Outpatient Prescriptions   Medication Sig Dispense Refill   • busPIRone (BUSPAR) 15 MG tablet TAKE ONE TABLET BY MOUTH FOUR TIMES A  tablet 1   • cloNIDine (CATAPRES) 0.1 MG tablet      • ibuprofen (ADVIL,MOTRIN) 600 MG tablet   0   • metoprolol tartrate (LOPRESSOR) 25 MG tablet 2 (Two) Times a Day.     • mirtazapine (REMERON) 30 MG tablet Take 1 tablet by mouth Every Night. 30 tablet 1   • OLANZapine (zyPREXA) 20 MG tablet Take 1 tablet by mouth Every Night. 30 tablet 1   • OXcarbazepine (TRILEPTAL) 600 MG tablet Take 1 tablet by mouth 2 (Two) Times a Day. 60 tablet 1   • pantoprazole (PROTONIX) 40 MG EC tablet Daily.     • prazosin (MINIPRESS) 2 MG capsule Take 1 capsule by mouth Every Night. 30 capsule 1   • venlafaxine XR (EFFEXOR-XR) 75 MG 24 hr capsule Take 1 capsule by mouth Every Morning. 30 capsule 1     No current facility-administered medications for this visit.        Mental Status Exam:   Hygiene:   fair  Cooperation:  Cooperative  Eye Contact:  Good  Psychomotor Behavior:  Appropriate  Affect:  Full range  Hopelessness: Denies  Speech:  Normal  Thought Process:  Goal directed and Linear  Thought Content:  Mood congurent  Suicidal:  None  Homicidal:  None  Hallucinations:  None  Delusion:  None  Memory:  Intact  Orientation:  Person, Place, Time and Situation  Reliability:  good  Insight:  Good  Judgement:  Good  Impulse Control:  Good  Physical/Medical Issues:  Yes hpt    Assessment/Plan   Diagnoses and all orders for this visit:    Severe episode of recurrent major depressive disorder, with psychotic features  -     prazosin (MINIPRESS) 1 MG capsule; Take 3 capsules by mouth Every Night.  -     busPIRone (BUSPAR) 15 MG tablet; Take 1 tablet by mouth 3 (Three) Times a Day.  -     mirtazapine (REMERON) 45 MG tablet; Take 1 tablet by mouth Every Night.  -     OLANZapine (zyPREXA) 20 MG tablet; Take 1 tablet by mouth Every  Night.  -     OXcarbazepine (TRILEPTAL) 600 MG tablet; Take 1 tablet by mouth 2 (Two) Times a Day.  -     venlafaxine XR (EFFEXOR-XR) 75 MG 24 hr capsule; Take 1 capsule by mouth Every Morning.  -     PARoxetine (PAXIL) 10 MG tablet; Take 1 tablet by mouth Daily. For 14 days then increase to 2 tablets daily    Post traumatic stress disorder (PTSD)  -     prazosin (MINIPRESS) 1 MG capsule; Take 3 capsules by mouth Every Night.  -     busPIRone (BUSPAR) 15 MG tablet; Take 1 tablet by mouth 3 (Three) Times a Day.  -     mirtazapine (REMERON) 45 MG tablet; Take 1 tablet by mouth Every Night.  -     OLANZapine (zyPREXA) 20 MG tablet; Take 1 tablet by mouth Every Night.  -     OXcarbazepine (TRILEPTAL) 600 MG tablet; Take 1 tablet by mouth 2 (Two) Times a Day.  -     venlafaxine XR (EFFEXOR-XR) 75 MG 24 hr capsule; Take 1 capsule by mouth Every Morning.  -     PARoxetine (PAXIL) 10 MG tablet; Take 1 tablet by mouth Daily. For 14 days then increase to 2 tablets daily        Patient does appear to be experiencing lessen of  depression with loss of energy and motivation we'll continue current medication.    Patient does appear to be greatly impacted by his depressive symptoms.  He was reminded on several occasions to immediately come to the hospital should he have any thoughts to harm himself or others   We discussed risks, benefits, and side effects of the above medication and the patient was agreeable with the plan.    Return in about 4 weeks (around 9/26/2017).  The patient was instructed to call clinic as needed or go to ER if in crisis.  Patient was instructed to immediately call 911 or come to the nearest emergency room for thoughts to harm self or others.

## 2017-09-08 DIAGNOSIS — F33.3 SEVERE EPISODE OF RECURRENT MAJOR DEPRESSIVE DISORDER, WITH PSYCHOTIC FEATURES (HCC): ICD-10-CM

## 2017-09-08 DIAGNOSIS — F43.10 POST TRAUMATIC STRESS DISORDER (PTSD): ICD-10-CM

## 2017-09-11 RX ORDER — PRAZOSIN HYDROCHLORIDE 1 MG/1
CAPSULE ORAL
Qty: 30 CAPSULE | Refills: 0 | Status: SHIPPED | OUTPATIENT
Start: 2017-09-11 | End: 2017-09-26

## 2017-09-18 DIAGNOSIS — F43.10 POST TRAUMATIC STRESS DISORDER (PTSD): ICD-10-CM

## 2017-09-18 DIAGNOSIS — F33.3 SEVERE EPISODE OF RECURRENT MAJOR DEPRESSIVE DISORDER, WITH PSYCHOTIC FEATURES (HCC): ICD-10-CM

## 2017-09-19 RX ORDER — MIRTAZAPINE 45 MG/1
TABLET, FILM COATED ORAL
Qty: 30 TABLET | Refills: 0 | Status: SHIPPED | OUTPATIENT
Start: 2017-09-19 | End: 2017-09-26 | Stop reason: SDUPTHER

## 2017-09-26 ENCOUNTER — OFFICE VISIT (OUTPATIENT)
Dept: PSYCHIATRY | Facility: CLINIC | Age: 47
End: 2017-09-26

## 2017-09-26 VITALS
SYSTOLIC BLOOD PRESSURE: 152 MMHG | DIASTOLIC BLOOD PRESSURE: 102 MMHG | HEIGHT: 69 IN | HEART RATE: 57 BPM | BODY MASS INDEX: 27.25 KG/M2 | WEIGHT: 184 LBS

## 2017-09-26 DIAGNOSIS — F33.3 SEVERE EPISODE OF RECURRENT MAJOR DEPRESSIVE DISORDER, WITH PSYCHOTIC FEATURES (HCC): Primary | ICD-10-CM

## 2017-09-26 DIAGNOSIS — F43.10 POST TRAUMATIC STRESS DISORDER (PTSD): ICD-10-CM

## 2017-09-26 PROCEDURE — 99213 OFFICE O/P EST LOW 20 MIN: CPT | Performed by: NURSE PRACTITIONER

## 2017-09-26 RX ORDER — MIRTAZAPINE 45 MG/1
45 TABLET, FILM COATED ORAL NIGHTLY
Qty: 30 TABLET | Refills: 0 | Status: SHIPPED | OUTPATIENT
Start: 2017-09-26 | End: 2017-11-27 | Stop reason: SDUPTHER

## 2017-09-26 RX ORDER — OLANZAPINE 20 MG/1
20 TABLET ORAL NIGHTLY
Qty: 30 TABLET | Refills: 0 | Status: SHIPPED | OUTPATIENT
Start: 2017-09-26 | End: 2017-10-26 | Stop reason: SDUPTHER

## 2017-09-26 RX ORDER — PAROXETINE 30 MG/1
30 TABLET, FILM COATED ORAL DAILY
Qty: 30 TABLET | Refills: 0 | Status: SHIPPED | OUTPATIENT
Start: 2017-09-26 | End: 2017-10-26

## 2017-09-26 RX ORDER — PRAZOSIN HYDROCHLORIDE 2 MG/1
4 CAPSULE ORAL NIGHTLY
Qty: 60 CAPSULE | Refills: 0 | Status: SHIPPED | OUTPATIENT
Start: 2017-09-26 | End: 2017-10-26 | Stop reason: SDUPTHER

## 2017-09-26 RX ORDER — BUSPIRONE HYDROCHLORIDE 15 MG/1
15 TABLET ORAL 3 TIMES DAILY
Qty: 120 TABLET | Refills: 0 | Status: SHIPPED | OUTPATIENT
Start: 2017-09-26 | End: 2017-10-26 | Stop reason: SDUPTHER

## 2017-09-26 RX ORDER — OXCARBAZEPINE 600 MG/1
600 TABLET, FILM COATED ORAL 2 TIMES DAILY
Qty: 60 TABLET | Refills: 0 | Status: SHIPPED | OUTPATIENT
Start: 2017-09-26 | End: 2017-10-26 | Stop reason: SDUPTHER

## 2017-09-26 RX ORDER — GUANFACINE 2 MG/1
4 TABLET ORAL NIGHTLY
Qty: 60 TABLET | Refills: 0 | Status: SHIPPED | OUTPATIENT
Start: 2017-09-26 | End: 2017-09-26

## 2017-09-26 NOTE — PROGRESS NOTES
"Subjective   Deni Lopez is a 47 y.o. male who is here today for medication management follow up.    Chief Complaint: \"My depression is really bad\".    HPI: History of Present Illness Patient presets with worsening depression. His motivation is improved.  He is having increased depression ongoing worsening feeling like he has 5-6 days a week of depression.  He reports severe anhedonia.    The patient reports depressive symptoms have improved including depressed mood, insomnia, decreased appetite, feelings of guilt, feelings of hopelessness, feelings of helplessness, feelings of worthlessness, low energy, difficulty concentrating, death wish and increased thoughts of death, and have caused impairment in important areas of functioning.  Depression rated 8/10 with 10 being the worst.  The patient did consistently and adamantly denying any suicidal intent or planning.  Patient states he would never harm himself because of his children.  Patient does report difficulty with loss of interest he is having significant loss of motivation.  He is experiencing ongoing symptoms of PTSD including avoidance of triggers hypersensitivity to stimuli nightmares and flashbacks.  He denies any substance related problems he denies any psychotic phenomenon but does report no current feelings of paranoia and feelings of impending doom.    The following portions of the patient's history were reviewed and updated as appropriate: allergies, current medications, past family history, past medical history, past social history, past surgical history and problem list.    Review of Systems  Patient denies any recent medical illnesses.  No acute cardiopulmonary symptoms evident.  No acute gastrointestinal complaints.  Patient's appetite and intake are adequate.  Patient's current weight compared with last weight.    Objective   Physical Exam  There were no vitals taken for this visit.    Allergies   Allergen Reactions   • Codeine Sulfate Hives "       Current Medications:   Current Outpatient Prescriptions   Medication Sig Dispense Refill   • busPIRone (BUSPAR) 15 MG tablet Take 1 tablet by mouth 3 (Three) Times a Day. 120 tablet 0   • cloNIDine (CATAPRES) 0.1 MG tablet      • ibuprofen (ADVIL,MOTRIN) 600 MG tablet   0   • metoprolol tartrate (LOPRESSOR) 25 MG tablet 2 (Two) Times a Day.     • mirtazapine (REMERON) 45 MG tablet TAKE ONE TABLET BY MOUTH AT BEDTIME 30 tablet 0   • OLANZapine (zyPREXA) 20 MG tablet Take 1 tablet by mouth Every Night. 30 tablet 0   • OXcarbazepine (TRILEPTAL) 600 MG tablet Take 1 tablet by mouth 2 (Two) Times a Day. 60 tablet 0   • pantoprazole (PROTONIX) 40 MG EC tablet Daily.     • PARoxetine (PAXIL) 10 MG tablet Take 1 tablet by mouth Daily. For 14 days then increase to 2 tablets daily 42 tablet 0   • prazosin (MINIPRESS) 1 MG capsule TAKE THREE CAPSULES BY MOUTH AT BEDTIME 30 capsule 0   • venlafaxine XR (EFFEXOR-XR) 75 MG 24 hr capsule Take 1 capsule by mouth Every Morning. 30 capsule 0     No current facility-administered medications for this visit.        Mental Status Exam:   Hygiene:   fair  Cooperation:  Cooperative  Eye Contact:  Good  Psychomotor Behavior:  Appropriate  Affect:  Full range  Hopelessness: Denies  Speech:  Normal  Thought Process:  Goal directed and Linear  Thought Content:  Mood congurent  Suicidal:  None  Homicidal:  None  Hallucinations:  None  Delusion:  None  Memory:  Intact  Orientation:  Person, Place, Time and Situation  Reliability:  good  Insight:  Good  Judgement:  Good  Impulse Control:  Good  Physical/Medical Issues:  Yes hpt    Assessment/Plan   Diagnoses and all orders for this visit:    Severe episode of recurrent major depressive disorder, with psychotic features  -     PARoxetine (PAXIL) 30 MG tablet; Take 1 tablet by mouth Daily.  -     OXcarbazepine (TRILEPTAL) 600 MG tablet; Take 1 tablet by mouth 2 (Two) Times a Day.  -     OLANZapine (zyPREXA) 20 MG tablet; Take 1 tablet by  mouth Every Night.  -     mirtazapine (REMERON) 45 MG tablet; Take 1 tablet by mouth Every Night.  -     busPIRone (BUSPAR) 15 MG tablet; Take 1 tablet by mouth 3 (Three) Times a Day.    Post traumatic stress disorder (PTSD)  -     PARoxetine (PAXIL) 30 MG tablet; Take 1 tablet by mouth Daily.  -     OXcarbazepine (TRILEPTAL) 600 MG tablet; Take 1 tablet by mouth 2 (Two) Times a Day.  -     OLANZapine (zyPREXA) 20 MG tablet; Take 1 tablet by mouth Every Night.  -     mirtazapine (REMERON) 45 MG tablet; Take 1 tablet by mouth Every Night.  -     busPIRone (BUSPAR) 15 MG tablet; Take 1 tablet by mouth 3 (Three) Times a Day.    Other orders  -     Discontinue: guanFACINE (TENEX) 2 MG tablet; Take 2 tablets by mouth Every Night.  -     prazosin (MINIPRESS) 2 MG capsule; Take 2 capsules by mouth Every Night.    refer to therapy for emdr.    Patient does appear to be experiencing lessen of  depression with loss of energy and motivation we'll continue current medication.    Patient does appear to be slightly improved.   Will continue other medication.   He was reminded on several occasions to immediately come to the hospital should he have any thoughts to harm himself or others   We discussed risks, benefits, and side effects of the above medication and the patient was agreeable with the plan.    Return in about 4 weeks (around 10/24/2017).  The patient was instructed to call clinic as needed or go to ER if in crisis.  Patient was instructed to immediately call 911 or come to the nearest emergency room for thoughts to harm self or others.        Patient expressed concern regarding sexual dysfunction.  Due to his blood pressure issues would defer to family md for clearance but do recommend viagra

## 2017-10-26 ENCOUNTER — OFFICE VISIT (OUTPATIENT)
Dept: PSYCHIATRY | Facility: CLINIC | Age: 47
End: 2017-10-26

## 2017-10-26 VITALS
HEIGHT: 69 IN | HEART RATE: 65 BPM | WEIGHT: 180 LBS | BODY MASS INDEX: 26.66 KG/M2 | DIASTOLIC BLOOD PRESSURE: 88 MMHG | SYSTOLIC BLOOD PRESSURE: 137 MMHG

## 2017-10-26 DIAGNOSIS — F43.10 POST TRAUMATIC STRESS DISORDER (PTSD): ICD-10-CM

## 2017-10-26 DIAGNOSIS — F33.3 SEVERE EPISODE OF RECURRENT MAJOR DEPRESSIVE DISORDER, WITH PSYCHOTIC FEATURES (HCC): ICD-10-CM

## 2017-10-26 PROCEDURE — 99214 OFFICE O/P EST MOD 30 MIN: CPT | Performed by: NURSE PRACTITIONER

## 2017-10-26 RX ORDER — OLANZAPINE 20 MG/1
20 TABLET ORAL NIGHTLY
Qty: 30 TABLET | Refills: 0 | Status: SHIPPED | OUTPATIENT
Start: 2017-10-26 | End: 2017-11-27 | Stop reason: SDUPTHER

## 2017-10-26 RX ORDER — FLUOXETINE 10 MG/1
20 CAPSULE ORAL DAILY
Qty: 98 CAPSULE | Refills: 0 | Status: SHIPPED | OUTPATIENT
Start: 2017-10-26 | End: 2017-11-27 | Stop reason: SDUPTHER

## 2017-10-26 RX ORDER — MIRTAZAPINE 15 MG/1
TABLET, FILM COATED ORAL
COMMUNITY
Start: 2017-10-06 | End: 2017-10-26

## 2017-10-26 RX ORDER — BUSPIRONE HYDROCHLORIDE 15 MG/1
15 TABLET ORAL 3 TIMES DAILY
Qty: 120 TABLET | Refills: 0 | Status: SHIPPED | OUTPATIENT
Start: 2017-10-26 | End: 2017-11-27 | Stop reason: SDUPTHER

## 2017-10-26 RX ORDER — OXCARBAZEPINE 600 MG/1
600 TABLET, FILM COATED ORAL 2 TIMES DAILY
Qty: 60 TABLET | Refills: 0 | Status: SHIPPED | OUTPATIENT
Start: 2017-10-26 | End: 2017-11-27 | Stop reason: SDUPTHER

## 2017-10-26 RX ORDER — PRAZOSIN HYDROCHLORIDE 2 MG/1
4 CAPSULE ORAL NIGHTLY
Qty: 60 CAPSULE | Refills: 0 | Status: SHIPPED | OUTPATIENT
Start: 2017-10-26 | End: 2017-11-27 | Stop reason: SDUPTHER

## 2017-10-26 NOTE — PROGRESS NOTES
"Subjective   Deni Lopez is a 47 y.o. male who is here today for medication management follow up.    Chief Complaint: \"My depression is really bad\".    HPI: History of Present Illness Patient presets with worsening depression. His motivation is improved.  He is having increased depression ongoing worsening feeling like he has 5-6 days a week of depression.  He reports severe anhedonia.    The patient reports depressive symptoms have improved including depressed mood, insomnia, decreased appetite, feelings of guilt, feelings of hopelessness, feelings of helplessness, feelings of worthlessness, low energy, difficulty concentrating, death wish and increased thoughts of death, and have caused impairment in important areas of functioning.  Depression rated 7/10 with 10 being the worst.  The patient did consistently and adamantly denying any suicidal intent or planning.  Patient states he would never harm himself because of his children.  Patient does report difficulty with loss of interest he is having significant loss of motivation. Shares his anxiety is elevated and rated at a 5/10 with 10 being the worst.The patient reports the following symptoms of anxiety: constant anxiety/worry, restlessness/on edge, difficulty concentrating, irritability and anxiety causes distress/impairment in important areas of functioning. His step son will be graduating form the [employer] Saturday and is experiencing extreme anxiety related to being around so many people.    He is experiencing ongoing symptoms of PTSD that are affecting his sleep he continues to have nightmares 2-3 times per week. Appetite is good weight is stable.       The following portions of the patient's history were reviewed and updated as appropriate: allergies, current medications, past family history, past medical history, past social history, past surgical history and problem list.    Review of Systems   Constitutional: Positive for fatigue. " "  Psychiatric/Behavioral: Positive for agitation, decreased concentration and sleep disturbance. Negative for behavioral problems, confusion, hallucinations, self-injury and suicidal ideas. The patient is nervous/anxious. The patient is not hyperactive.      Patient denies any recent medical illnesses.  No acute cardiopulmonary symptoms evident.  No acute gastrointestinal complaints.  Patient's appetite and intake are adequate.  Patient's current weight compared with last weight.    Objective   Physical Exam  Blood pressure 137/88, pulse 65, height 69\" (175.3 cm), weight 180 lb (81.6 kg).    Allergies   Allergen Reactions   • Codeine Sulfate Hives       Current Medications:   Current Outpatient Prescriptions   Medication Sig Dispense Refill   • busPIRone (BUSPAR) 15 MG tablet Take 1 tablet by mouth 3 (Three) Times a Day. 120 tablet 0   • cloNIDine (CATAPRES) 0.1 MG tablet      • ibuprofen (ADVIL,MOTRIN) 600 MG tablet   0   • metoprolol tartrate (LOPRESSOR) 25 MG tablet 2 (Two) Times a Day.     • mirtazapine (REMERON) 45 MG tablet Take 1 tablet by mouth Every Night. 30 tablet 0   • OLANZapine (zyPREXA) 20 MG tablet Take 1 tablet by mouth Every Night. 30 tablet 0   • OXcarbazepine (TRILEPTAL) 600 MG tablet Take 1 tablet by mouth 2 (Two) Times a Day. 60 tablet 0   • pantoprazole (PROTONIX) 40 MG EC tablet Daily.     • prazosin (MINIPRESS) 2 MG capsule Take 2 capsules by mouth Every Night. 60 capsule 0   • FLUoxetine (PROzac) 10 MG capsule Take 2 capsules by mouth Daily. For 1 week then increase to 40mg po daily discontinue paxil start with 15mg for two weeks then discontinue. 98 capsule 0     No current facility-administered medications for this visit.        Mental Status Exam:   Hygiene:   fair  Cooperation:  Cooperative  Eye Contact:  Good  Psychomotor Behavior:  Appropriate  Affect:  Full range  Hopelessness: Denies  Speech:  Normal  Thought Process:  Goal directed and Linear  Thought Content:  Mood " congurent  Suicidal:  None  Homicidal:  None  Hallucinations:  None  Delusion:  None  Memory:  Intact  Orientation:  Person, Place, Time and Situation  Reliability:  good  Insight:  Good  Judgement:  Good  Impulse Control:  Good  Physical/Medical Issues:  Yes hpt    Assessment/Plan   Diagnoses and all orders for this visit:    Severe episode of recurrent major depressive disorder, with psychotic features  -     prazosin (MINIPRESS) 2 MG capsule; Take 2 capsules by mouth Every Night.  -     busPIRone (BUSPAR) 15 MG tablet; Take 1 tablet by mouth 3 (Three) Times a Day.  -     OXcarbazepine (TRILEPTAL) 600 MG tablet; Take 1 tablet by mouth 2 (Two) Times a Day.  -     OLANZapine (zyPREXA) 20 MG tablet; Take 1 tablet by mouth Every Night.  -     FLUoxetine (PROzac) 10 MG capsule; Take 2 capsules by mouth Daily. For 1 week then increase to 40mg po daily discontinue paxil start with 15mg for two weeks then discontinue.    Post traumatic stress disorder (PTSD)  -     prazosin (MINIPRESS) 2 MG capsule; Take 2 capsules by mouth Every Night.  -     busPIRone (BUSPAR) 15 MG tablet; Take 1 tablet by mouth 3 (Three) Times a Day.  -     OXcarbazepine (TRILEPTAL) 600 MG tablet; Take 1 tablet by mouth 2 (Two) Times a Day.  -     OLANZapine (zyPREXA) 20 MG tablet; Take 1 tablet by mouth Every Night.  -     FLUoxetine (PROzac) 10 MG capsule; Take 2 capsules by mouth Daily. For 1 week then increase to 40mg po daily discontinue paxil start with 15mg for two weeks then discontinue.    patient had to reschedule therapy appt for emdr.  Patient will have genetic testing for medication and history of significant non responsive to multiple medication.    Patient does appear to be experiencing increase depression  depression with loss of energy and motivation Discontinue paxil as it is not effective and restart prozac for symptotic depression.  Will continue other medication.   He was reminded on several occasions to immediately come to the  hospital should he have any thoughts to harm himself or others   We discussed risks, benefits, and side effects of the above medication and the patient was agreeable with the plan.    Follow up in 4 weeks  The patient was instructed to call clinic as needed or go to ER if in crisis.  Patient was instructed to immediately call 911 or come to the nearest emergency room for thoughts to harm self or others.    Patient was encouraged to not have fire arms in the home. If firearms are present they need to be locked and secured and bullets in separate areas. The patient was also encouraged if suicidal thoughts are present then he should discontinue the medications, call the [facility] and report to the ER for psychiatric evaluation.

## 2017-11-27 ENCOUNTER — OFFICE VISIT (OUTPATIENT)
Dept: PSYCHIATRY | Facility: CLINIC | Age: 47
End: 2017-11-27

## 2017-11-27 VITALS
BODY MASS INDEX: 26.36 KG/M2 | SYSTOLIC BLOOD PRESSURE: 174 MMHG | DIASTOLIC BLOOD PRESSURE: 105 MMHG | WEIGHT: 178 LBS | HEIGHT: 69 IN | HEART RATE: 68 BPM

## 2017-11-27 DIAGNOSIS — F43.10 POST TRAUMATIC STRESS DISORDER (PTSD): ICD-10-CM

## 2017-11-27 DIAGNOSIS — F33.3 SEVERE EPISODE OF RECURRENT MAJOR DEPRESSIVE DISORDER, WITH PSYCHOTIC FEATURES (HCC): Primary | ICD-10-CM

## 2017-11-27 PROCEDURE — 99213 OFFICE O/P EST LOW 20 MIN: CPT | Performed by: NURSE PRACTITIONER

## 2017-11-27 RX ORDER — MIRTAZAPINE 45 MG/1
45 TABLET, FILM COATED ORAL NIGHTLY
Qty: 30 TABLET | Refills: 1 | Status: SHIPPED | OUTPATIENT
Start: 2017-11-27 | End: 2018-01-26 | Stop reason: SDUPTHER

## 2017-11-27 RX ORDER — BUSPIRONE HYDROCHLORIDE 15 MG/1
15 TABLET ORAL 3 TIMES DAILY
Qty: 120 TABLET | Refills: 1 | Status: SHIPPED | OUTPATIENT
Start: 2017-11-27 | End: 2018-01-26 | Stop reason: SDUPTHER

## 2017-11-27 RX ORDER — PRAZOSIN HYDROCHLORIDE 2 MG/1
4 CAPSULE ORAL NIGHTLY
Qty: 60 CAPSULE | Refills: 1 | Status: SHIPPED | OUTPATIENT
Start: 2017-11-27 | End: 2018-01-26 | Stop reason: SDUPTHER

## 2017-11-27 RX ORDER — OXCARBAZEPINE 600 MG/1
600 TABLET, FILM COATED ORAL 2 TIMES DAILY
Qty: 60 TABLET | Refills: 1 | Status: SHIPPED | OUTPATIENT
Start: 2017-11-27 | End: 2018-01-26 | Stop reason: SDUPTHER

## 2017-11-27 RX ORDER — FLUOXETINE HYDROCHLORIDE 40 MG/1
80 CAPSULE ORAL DAILY
Qty: 30 CAPSULE | Refills: 0 | Status: SHIPPED | OUTPATIENT
Start: 2017-11-27 | End: 2017-12-14 | Stop reason: SDUPTHER

## 2017-11-27 RX ORDER — FLUOXETINE HYDROCHLORIDE 20 MG/1
40 CAPSULE ORAL DAILY
Refills: 1 | COMMUNITY
Start: 2017-11-21 | End: 2017-11-27

## 2017-11-27 RX ORDER — OLANZAPINE 20 MG/1
20 TABLET ORAL NIGHTLY
Qty: 30 TABLET | Refills: 1 | Status: SHIPPED | OUTPATIENT
Start: 2017-11-27 | End: 2018-01-26 | Stop reason: SDUPTHER

## 2017-11-27 NOTE — PROGRESS NOTES
"Subjective   Deni Lopez is a 47 y.o. male who is here today for medication management follow up.    Chief Complaint: \"My depression is somewhat better    HPI: History of Present Illness Patient presets with worsening depression. His motivation is improved.  He is having increased depression.  He reports much less anhedonia.    The patient reports depressive symptoms have improved including depressed mood, insomnia, decreased appetite, feelings of guilt, feelings of hopelessness, feelings of helplessness, feelings of worthlessness, low energy, difficulty concentrating, death wish and increased thoughts of death, and have caused impairment in important areas of functioning.  Depression rated 4/10 with 10 being the worst.  The patient did consistently and adamantly denying any suicidal intent or planning.  Patient states he would never harm himself because of his children.  Patient does report difficulty with loss of interest he is having significant loss of motivation. Shares his anxiety is elevated and rated at a 5/10 with 10 being the worst.The patient reports the following symptoms of anxiety: constant anxiety/worry, restlessness/on edge, difficulty concentrating, irritability and anxiety causes distress/impairment in important areas of functioning. His step son will be graduating form the [employer] Saturday and is experiencing extreme anxiety related to being around so many people.    His depression is much better, he is more involved.  Appetite is good weight is stable.       The following portions of the patient's history were reviewed and updated as appropriate: allergies, current medications, past family history, past medical history, past social history, past surgical history and problem list.    Review of Systems   Constitutional: Positive for fatigue.   Psychiatric/Behavioral: Positive for agitation and sleep disturbance. Negative for behavioral problems, confusion, decreased concentration, " "hallucinations, self-injury and suicidal ideas. The patient is nervous/anxious. The patient is not hyperactive.      Patient denies any recent medical illnesses.  No acute cardiopulmonary symptoms evident.  No acute gastrointestinal complaints.  Patient's appetite and intake are adequate.  Patient's current weight compared with last weight.    Objective   Physical Exam  Blood pressure (!) 174/105, pulse 68, height 69\" (175.3 cm), weight 178 lb (80.7 kg).    Allergies   Allergen Reactions   • Codeine Sulfate Hives       Current Medications:   Current Outpatient Prescriptions   Medication Sig Dispense Refill   • busPIRone (BUSPAR) 15 MG tablet Take 1 tablet by mouth 3 (Three) Times a Day. 120 tablet 0   • cloNIDine (CATAPRES) 0.1 MG tablet      • FLUoxetine (PROzac) 20 MG capsule 40 mg Daily.  1   • ibuprofen (ADVIL,MOTRIN) 600 MG tablet   0   • metoprolol tartrate (LOPRESSOR) 25 MG tablet 2 (Two) Times a Day.     • mirtazapine (REMERON) 45 MG tablet Take 1 tablet by mouth Every Night. 30 tablet 0   • OLANZapine (zyPREXA) 20 MG tablet Take 1 tablet by mouth Every Night. 30 tablet 0   • OXcarbazepine (TRILEPTAL) 600 MG tablet Take 1 tablet by mouth 2 (Two) Times a Day. 60 tablet 0   • pantoprazole (PROTONIX) 40 MG EC tablet Daily.     • prazosin (MINIPRESS) 2 MG capsule Take 2 capsules by mouth Every Night. 60 capsule 0     No current facility-administered medications for this visit.        Mental Status Exam:   Hygiene:   fair  Cooperation:  Cooperative  Eye Contact:  Good  Psychomotor Behavior:  Appropriate  Affect:  Full range  Hopelessness: Denies  Speech:  Normal  Thought Process:  Goal directed and Linear  Thought Content:  Mood congurent  Suicidal:  None  Homicidal:  None  Hallucinations:  None  Delusion:  None  Memory:  Intact  Orientation:  Person, Place, Time and Situation  Reliability:  good  Insight:  Good  Judgement:  Good  Impulse Control:  Good  Physical/Medical Issues:  Yes hpt    Assessment/Plan "   Diagnoses and all orders for this visit:    Severe episode of recurrent major depressive disorder, with psychotic features  -     busPIRone (BUSPAR) 15 MG tablet; Take 1 tablet by mouth 3 (Three) Times a Day.  -     mirtazapine (REMERON) 45 MG tablet; Take 1 tablet by mouth Every Night.  -     OLANZapine (zyPREXA) 20 MG tablet; Take 1 tablet by mouth Every Night.  -     OXcarbazepine (TRILEPTAL) 600 MG tablet; Take 1 tablet by mouth 2 (Two) Times a Day.  -     prazosin (MINIPRESS) 2 MG capsule; Take 2 capsules by mouth Every Night.    Post traumatic stress disorder (PTSD)  -     busPIRone (BUSPAR) 15 MG tablet; Take 1 tablet by mouth 3 (Three) Times a Day.  -     mirtazapine (REMERON) 45 MG tablet; Take 1 tablet by mouth Every Night.  -     OLANZapine (zyPREXA) 20 MG tablet; Take 1 tablet by mouth Every Night.  -     OXcarbazepine (TRILEPTAL) 600 MG tablet; Take 1 tablet by mouth 2 (Two) Times a Day.  -     prazosin (MINIPRESS) 2 MG capsule; Take 2 capsules by mouth Every Night.    Other orders  -     FLUoxetine (PROZAC) 40 MG capsule; Take 2 capsules by mouth Daily.  will slightly increase prozac.  Patient will have genetic testing for medication and history of significant non responsive to multiple medication.    Patient does appear to be experiencing increase depression  depression with loss of energy and motivation Discontinue paxil as it is not effective and restart prozac for symptotic depression.  Will continue other medication.   He was reminded on several occasions to immediately come to the hospital should he have any thoughts to harm himself or others   We discussed risks, benefits, and side effects of the above medication and the patient was agreeable with the plan.    Follow up in 4 weeks  The patient was instructed to call clinic as needed or go to ER if in crisis.  Patient was instructed to immediately call 911 or come to the nearest emergency room for thoughts to harm self or others.    Patient was  encouraged to not have fire arms in the home. If firearms are present they need to be locked and secured and bullets in separate areas. The patient was also encouraged if suicidal thoughts are present then he should discontinue the medications, call the [facility] and report to the ER for psychiatric evaluation.

## 2017-12-14 RX ORDER — FLUOXETINE HYDROCHLORIDE 40 MG/1
CAPSULE ORAL
Qty: 60 CAPSULE | Refills: 0 | Status: SHIPPED | OUTPATIENT
Start: 2017-12-14 | End: 2018-01-26 | Stop reason: SDUPTHER

## 2018-01-26 DIAGNOSIS — F43.10 POST TRAUMATIC STRESS DISORDER (PTSD): ICD-10-CM

## 2018-01-26 DIAGNOSIS — F33.3 SEVERE EPISODE OF RECURRENT MAJOR DEPRESSIVE DISORDER, WITH PSYCHOTIC FEATURES (HCC): ICD-10-CM

## 2018-01-29 RX ORDER — OLANZAPINE 20 MG/1
20 TABLET ORAL NIGHTLY
Qty: 30 TABLET | Refills: 1 | Status: SHIPPED | OUTPATIENT
Start: 2018-01-29 | End: 2018-03-06 | Stop reason: SDUPTHER

## 2018-01-29 RX ORDER — PRAZOSIN HYDROCHLORIDE 2 MG/1
4 CAPSULE ORAL NIGHTLY
Qty: 60 CAPSULE | Refills: 1 | Status: SHIPPED | OUTPATIENT
Start: 2018-01-29 | End: 2018-03-06 | Stop reason: SDUPTHER

## 2018-01-29 RX ORDER — OXCARBAZEPINE 600 MG/1
600 TABLET, FILM COATED ORAL EVERY 12 HOURS SCHEDULED
Qty: 60 TABLET | Refills: 1 | Status: SHIPPED | OUTPATIENT
Start: 2018-01-29 | End: 2018-03-06 | Stop reason: SDUPTHER

## 2018-01-29 RX ORDER — BUSPIRONE HYDROCHLORIDE 15 MG/1
15 TABLET ORAL 3 TIMES DAILY
Qty: 120 TABLET | Refills: 1 | Status: SHIPPED | OUTPATIENT
Start: 2018-01-29 | End: 2018-03-06 | Stop reason: SDUPTHER

## 2018-01-29 RX ORDER — FLUOXETINE HYDROCHLORIDE 40 MG/1
40 CAPSULE ORAL DAILY
Qty: 60 CAPSULE | Refills: 0 | Status: SHIPPED | OUTPATIENT
Start: 2018-01-29 | End: 2018-03-06 | Stop reason: SDUPTHER

## 2018-01-29 RX ORDER — MIRTAZAPINE 45 MG/1
45 TABLET, FILM COATED ORAL NIGHTLY
Qty: 30 TABLET | Refills: 1 | Status: SHIPPED | OUTPATIENT
Start: 2018-01-29 | End: 2018-03-06 | Stop reason: SDUPTHER

## 2018-02-22 ENCOUNTER — TELEPHONE (OUTPATIENT)
Dept: PSYCHIATRY | Facility: CLINIC | Age: 48
End: 2018-02-22

## 2018-02-22 NOTE — TELEPHONE ENCOUNTER
Patient wants to make an appointment but I did see the number of no shows/cancellations he has had. Do you want me to add him back to your schedule

## 2018-03-06 ENCOUNTER — OFFICE VISIT (OUTPATIENT)
Dept: PSYCHIATRY | Facility: CLINIC | Age: 48
End: 2018-03-06

## 2018-03-06 VITALS
HEART RATE: 61 BPM | HEIGHT: 69 IN | WEIGHT: 176 LBS | SYSTOLIC BLOOD PRESSURE: 188 MMHG | DIASTOLIC BLOOD PRESSURE: 95 MMHG | BODY MASS INDEX: 26.07 KG/M2

## 2018-03-06 DIAGNOSIS — F33.3 SEVERE EPISODE OF RECURRENT MAJOR DEPRESSIVE DISORDER, WITH PSYCHOTIC FEATURES (HCC): ICD-10-CM

## 2018-03-06 DIAGNOSIS — F43.10 POST TRAUMATIC STRESS DISORDER (PTSD): ICD-10-CM

## 2018-03-06 PROCEDURE — 99213 OFFICE O/P EST LOW 20 MIN: CPT | Performed by: NURSE PRACTITIONER

## 2018-03-06 RX ORDER — PRAZOSIN HYDROCHLORIDE 2 MG/1
4 CAPSULE ORAL NIGHTLY
Qty: 60 CAPSULE | Refills: 1 | Status: SHIPPED | OUTPATIENT
Start: 2018-03-06 | End: 2018-05-07 | Stop reason: SDUPTHER

## 2018-03-06 RX ORDER — FLUOXETINE HYDROCHLORIDE 40 MG/1
40 CAPSULE ORAL DAILY
Qty: 60 CAPSULE | Refills: 0 | Status: SHIPPED | OUTPATIENT
Start: 2018-03-06 | End: 2018-05-07

## 2018-03-06 RX ORDER — MIRTAZAPINE 45 MG/1
45 TABLET, FILM COATED ORAL NIGHTLY
Qty: 30 TABLET | Refills: 1 | Status: SHIPPED | OUTPATIENT
Start: 2018-03-06 | End: 2018-05-07 | Stop reason: SDUPTHER

## 2018-03-06 RX ORDER — BUSPIRONE HYDROCHLORIDE 15 MG/1
15 TABLET ORAL 3 TIMES DAILY
Qty: 120 TABLET | Refills: 1 | Status: SHIPPED | OUTPATIENT
Start: 2018-03-06 | End: 2018-05-07 | Stop reason: SDUPTHER

## 2018-03-06 RX ORDER — OXCARBAZEPINE 600 MG/1
600 TABLET, FILM COATED ORAL EVERY 12 HOURS SCHEDULED
Qty: 60 TABLET | Refills: 1 | Status: SHIPPED | OUTPATIENT
Start: 2018-03-06 | End: 2018-05-07 | Stop reason: SDUPTHER

## 2018-03-06 RX ORDER — OLANZAPINE 20 MG/1
20 TABLET ORAL NIGHTLY
Qty: 30 TABLET | Refills: 1 | Status: SHIPPED | OUTPATIENT
Start: 2018-03-06 | End: 2018-05-07 | Stop reason: SDUPTHER

## 2018-03-06 NOTE — PROGRESS NOTES
"Subjective   Deni Lopez is a 47 y.o. male who is here today for medication management follow up.    Chief Complaint: \"I'm good\"    HPI: History of Present Illness Patient presets with worsening depression. His motivation is improved.  He is having lessening depression.  He is reporting medication is helpful.  He is sleeping well.    The patient reports depressive symptoms have improved including depressed mood, insomnia, decreased appetite, feelings of guilt, feelings of hopelessness, feelings of helplessness, feelings of worthlessness, low energy, difficulty concentrating, death wish and increased thoughts of death, and have caused impairment in important areas of functioning.  Depression rated 3-4/10 with 10 being the worst.  The patient did consistently and adamantly denying any suicidal intent or planning.  Patient states he would never harm himself because of his children.  Patient does report difficulty with loss of interest he is having significant loss of motivation. Shares his anxiety is elevated and rated at a 7-8 /10 with 10 being the worst.The patient reports the following symptoms of anxiety: constant anxiety/worry, restlessness/on edge, difficulty concentrating, irritability and anxiety causes distress/impairment in important areas of functioning.    His depression is much better, he is more involved.  Appetite is good weight is stable.   Patient's BMI does appear to be stable and denies any recent weight gain or side effects from medications.    The following portions of the patient's history were reviewed and updated as appropriate: allergies, current medications, past family history, past medical history, past social history, past surgical history and problem list.    Review of Systems   Constitutional: Positive for fatigue.   Psychiatric/Behavioral: Positive for agitation and sleep disturbance. Negative for behavioral problems, confusion, decreased concentration, hallucinations, self-injury and " "suicidal ideas. The patient is nervous/anxious. The patient is not hyperactive.      Patient denies any recent medical illnesses.  No acute cardiopulmonary symptoms evident.  No acute gastrointestinal complaints.  Patient's appetite and intake are adequate.  Patient's current weight compared with last weight.    Objective   Physical Exam  Blood pressure (!) 188/95, pulse 61, height 175.3 cm (69\"), weight 79.8 kg (176 lb).    Allergies   Allergen Reactions   • Codeine Sulfate Hives       Current Medications:   Current Outpatient Prescriptions   Medication Sig Dispense Refill   • busPIRone (BUSPAR) 15 MG tablet Take 1 tablet by mouth 3 (Three) Times a Day. 120 tablet 1   • cloNIDine (CATAPRES) 0.1 MG tablet      • FLUoxetine (PROzac) 40 MG capsule Take 1 capsule by mouth Daily. 60 capsule 0   • metoprolol tartrate (LOPRESSOR) 25 MG tablet 2 (Two) Times a Day.     • mirtazapine (REMERON) 45 MG tablet Take 1 tablet by mouth Every Night. 30 tablet 1   • OLANZapine (zyPREXA) 20 MG tablet Take 1 tablet by mouth Every Night. 30 tablet 1   • OXcarbazepine (TRILEPTAL) 600 MG tablet Take 1 tablet by mouth Every 12 (Twelve) Hours. 60 tablet 1   • pantoprazole (PROTONIX) 40 MG EC tablet Daily.     • prazosin (MINIPRESS) 2 MG capsule Take 2 capsules by mouth Every Night. 60 capsule 1   • ibuprofen (ADVIL,MOTRIN) 600 MG tablet   0     No current facility-administered medications for this visit.        Mental Status Exam:   Hygiene:   fair  Cooperation:  Cooperative  Eye Contact:  Good  Psychomotor Behavior:  Appropriate  Affect:  Full range  Hopelessness: Denies  Speech:  Normal  Thought Process:  Goal directed and Linear  Thought Content:  Mood congurent  Suicidal:  None  Homicidal:  None  Hallucinations:  None  Delusion:  None  Memory:  Intact  Orientation:  Person, Place, Time and Situation  Reliability:  good  Insight:  Good  Judgement:  Good  Impulse Control:  Good  Physical/Medical Issues:  Yes hpt    Assessment/Plan "   Diagnoses and all orders for this visit:    Severe episode of recurrent major depressive disorder, with psychotic features  -     FLUoxetine (PROzac) 40 MG capsule; Take 1 capsule by mouth Daily.  -     OXcarbazepine (TRILEPTAL) 600 MG tablet; Take 1 tablet by mouth Every 12 (Twelve) Hours.  -     OLANZapine (zyPREXA) 20 MG tablet; Take 1 tablet by mouth Every Night.  -     mirtazapine (REMERON) 45 MG tablet; Take 1 tablet by mouth Every Night.  -     prazosin (MINIPRESS) 2 MG capsule; Take 2 capsules by mouth Every Night.  -     busPIRone (BUSPAR) 15 MG tablet; Take 1 tablet by mouth 3 (Three) Times a Day.    Post traumatic stress disorder (PTSD)  -     FLUoxetine (PROzac) 40 MG capsule; Take 1 capsule by mouth Daily.  -     OXcarbazepine (TRILEPTAL) 600 MG tablet; Take 1 tablet by mouth Every 12 (Twelve) Hours.  -     OLANZapine (zyPREXA) 20 MG tablet; Take 1 tablet by mouth Every Night.  -     mirtazapine (REMERON) 45 MG tablet; Take 1 tablet by mouth Every Night.  -     prazosin (MINIPRESS) 2 MG capsule; Take 2 capsules by mouth Every Night.  -     busPIRone (BUSPAR) 15 MG tablet; Take 1 tablet by mouth 3 (Three) Times a Day.  Patient will be continued on medication.  Patient was informed of his blood pressure today he was encouraged to make an appointment with his primary care provider as soon as possible for further assessment.  Patient did agree and stated he is monitoring his blood pressure.  .  Will continue other medication.   He was reminded on several occasions to immediately come to the hospital should he have any thoughts to harm himself or others   We discussed risks, benefits, and side effects of the above medication and the patient was agreeable with the plan.    Follow up in 6-8 weeks.  The patient was instructed to call clinic as needed or go to ER if in crisis.  Patient was instructed to immediately call 911 or come to the nearest emergency room for thoughts to harm self or others.

## 2018-05-07 ENCOUNTER — OFFICE VISIT (OUTPATIENT)
Dept: PSYCHIATRY | Facility: CLINIC | Age: 48
End: 2018-05-07

## 2018-05-07 VITALS
BODY MASS INDEX: 26.07 KG/M2 | HEIGHT: 69 IN | WEIGHT: 176 LBS | SYSTOLIC BLOOD PRESSURE: 159 MMHG | DIASTOLIC BLOOD PRESSURE: 103 MMHG | HEART RATE: 69 BPM

## 2018-05-07 DIAGNOSIS — F33.3 SEVERE EPISODE OF RECURRENT MAJOR DEPRESSIVE DISORDER, WITH PSYCHOTIC FEATURES (HCC): Primary | ICD-10-CM

## 2018-05-07 DIAGNOSIS — F43.10 POST TRAUMATIC STRESS DISORDER (PTSD): ICD-10-CM

## 2018-05-07 PROCEDURE — 99214 OFFICE O/P EST MOD 30 MIN: CPT | Performed by: NURSE PRACTITIONER

## 2018-05-07 RX ORDER — PRAZOSIN HYDROCHLORIDE 2 MG/1
4 CAPSULE ORAL NIGHTLY
Qty: 60 CAPSULE | Refills: 1 | Status: SHIPPED | OUTPATIENT
Start: 2018-05-07 | End: 2018-07-02 | Stop reason: SDUPTHER

## 2018-05-07 RX ORDER — MIRTAZAPINE 45 MG/1
45 TABLET, FILM COATED ORAL NIGHTLY
Qty: 30 TABLET | Refills: 1 | Status: SHIPPED | OUTPATIENT
Start: 2018-05-07 | End: 2018-07-02 | Stop reason: SDUPTHER

## 2018-05-07 RX ORDER — OLANZAPINE 20 MG/1
20 TABLET ORAL NIGHTLY
Qty: 30 TABLET | Refills: 1 | Status: SHIPPED | OUTPATIENT
Start: 2018-05-07 | End: 2018-07-02 | Stop reason: SDUPTHER

## 2018-05-07 RX ORDER — FLUOXETINE HYDROCHLORIDE 20 MG/1
60 CAPSULE ORAL DAILY
Qty: 90 CAPSULE | Refills: 1 | Status: SHIPPED | OUTPATIENT
Start: 2018-05-07 | End: 2018-07-02 | Stop reason: SDUPTHER

## 2018-05-07 RX ORDER — OXCARBAZEPINE 600 MG/1
600 TABLET, FILM COATED ORAL EVERY 12 HOURS SCHEDULED
Qty: 60 TABLET | Refills: 1 | Status: SHIPPED | OUTPATIENT
Start: 2018-05-07 | End: 2018-07-02 | Stop reason: SDUPTHER

## 2018-05-07 RX ORDER — TRAZODONE HYDROCHLORIDE 50 MG/1
12.5-25 TABLET ORAL NIGHTLY
Qty: 30 TABLET | Refills: 0 | Status: SHIPPED | OUTPATIENT
Start: 2018-05-07 | End: 2018-07-02 | Stop reason: SDUPTHER

## 2018-05-07 RX ORDER — BUSPIRONE HYDROCHLORIDE 15 MG/1
15 TABLET ORAL 3 TIMES DAILY
Qty: 120 TABLET | Refills: 1 | Status: SHIPPED | OUTPATIENT
Start: 2018-05-07 | End: 2018-07-02 | Stop reason: SDUPTHER

## 2018-05-07 NOTE — PROGRESS NOTES
"Subjective   Deni Lopez is a 47 y.o. male who is here today for medication management follow up.    Chief Complaint: \"I'm good\"    HPI: History of Present Illness Patient presets with lessening depression.   He is having difficulty with hopelessness-getting out and about.  He continues to having some difficulty with sleep.   He frequently awakens and is unable to return to sleep.  He is reporting medication is helpful.  He is sleeping well.    The patient denies any depressive symptoms but has had including depressed mood, insomnia, decreased appetite, feelings of guilt, feelings of hopelessness, feelings of helplessness, feelings of worthlessness, low energy, difficulty concentrating, death wish and increased thoughts of death, and have caused impairment in important areas of functioning.  Depression rated 2/10 with 10 being the worst.  The patient did consistently and adamantly denying any suicidal intent or planning.  Patient states he would never harm himself because of his children.  The patient reports the following symptoms of anxiety: constant anxiety/worry, restlessness/on edge, difficulty concentrating, irritability and anxiety causes distress/impairment in important areas of functioning.    His depression is much better, he is more involved.  Appetite is good weight is stable.       The following portions of the patient's history were reviewed and updated as appropriate: allergies, current medications, past family history, past medical history, past social history, past surgical history and problem list.    Review of Systems   Constitutional: Positive for fatigue.   Psychiatric/Behavioral: Positive for agitation and sleep disturbance. Negative for behavioral problems, confusion, decreased concentration, hallucinations, self-injury and suicidal ideas. The patient is nervous/anxious. The patient is not hyperactive.      Patient denies any recent medical illnesses.  No acute cardiopulmonary symptoms " "evident.  No acute gastrointestinal complaints.  Patient's appetite and intake are adequate.  Patient's current weight compared with last weight.    Objective   Physical Exam  Blood pressure (!) 159/103, pulse 69, height 175.3 cm (69.02\"), weight 79.8 kg (176 lb).    Allergies   Allergen Reactions   • Codeine Sulfate Hives       Current Medications:   Current Outpatient Prescriptions   Medication Sig Dispense Refill   • busPIRone (BUSPAR) 15 MG tablet Take 1 tablet by mouth 3 (Three) Times a Day. 120 tablet 1   • cloNIDine (CATAPRES) 0.1 MG tablet      • FLUoxetine (PROzac) 40 MG capsule Take 1 capsule by mouth Daily. 60 capsule 0   • ibuprofen (ADVIL,MOTRIN) 600 MG tablet   0   • metoprolol tartrate (LOPRESSOR) 25 MG tablet 2 (Two) Times a Day.     • mirtazapine (REMERON) 45 MG tablet Take 1 tablet by mouth Every Night. 30 tablet 1   • OLANZapine (zyPREXA) 20 MG tablet Take 1 tablet by mouth Every Night. 30 tablet 1   • OXcarbazepine (TRILEPTAL) 600 MG tablet Take 1 tablet by mouth Every 12 (Twelve) Hours. 60 tablet 1   • pantoprazole (PROTONIX) 40 MG EC tablet Daily.     • prazosin (MINIPRESS) 2 MG capsule Take 2 capsules by mouth Every Night. 60 capsule 1     No current facility-administered medications for this visit.        Mental Status Exam:   Hygiene:   fair  Cooperation:  Cooperative  Eye Contact:  Good  Psychomotor Behavior:  Appropriate  Affect:  Full range  Hopelessness: Denies  Speech:  Normal  Thought Process:  Goal directed and Linear  Thought Content:  Mood congurent  Suicidal:  None  Homicidal:  None  Hallucinations:  None  Delusion:  None  Memory:  Intact  Orientation:  Person, Place, Time and Situation  Reliability:  good  Insight:  Good  Judgement:  Good  Impulse Control:  Good  Physical/Medical Issues:  Yes hpt    Assessment/Plan   Diagnoses and all orders for this visit:    Severe episode of recurrent major depressive disorder, with psychotic features  -     prazosin (MINIPRESS) 2 MG capsule; " Take 2 capsules by mouth Every Night.  -     OXcarbazepine (TRILEPTAL) 600 MG tablet; Take 1 tablet by mouth Every 12 (Twelve) Hours.  -     OLANZapine (zyPREXA) 20 MG tablet; Take 1 tablet by mouth Every Night.  -     mirtazapine (REMERON) 45 MG tablet; Take 1 tablet by mouth Every Night.  -     busPIRone (BUSPAR) 15 MG tablet; Take 1 tablet by mouth 3 (Three) Times a Day.    Post traumatic stress disorder (PTSD)  -     prazosin (MINIPRESS) 2 MG capsule; Take 2 capsules by mouth Every Night.  -     OXcarbazepine (TRILEPTAL) 600 MG tablet; Take 1 tablet by mouth Every 12 (Twelve) Hours.  -     OLANZapine (zyPREXA) 20 MG tablet; Take 1 tablet by mouth Every Night.  -     mirtazapine (REMERON) 45 MG tablet; Take 1 tablet by mouth Every Night.  -     busPIRone (BUSPAR) 15 MG tablet; Take 1 tablet by mouth 3 (Three) Times a Day.    Other orders  -     FLUoxetine (PROZAC) 20 MG capsule; Take 3 capsules by mouth Daily.  -     traZODone (DESYREL) 50 MG tablet; Take 0.25-0.5 tablets by mouth Every Night.    Patient will be continued on medication.  Patient was informed of his blood pressure today he was encouraged to make an appointment with his primary care provider as soon as possible for further assessment.  Patient did agree and stated he is monitoring his blood pressure.  .  Will continue other medication.   He was reminded on several occasions to immediately come to the hospital should he have any thoughts to harm himself or others   We discussed risks, benefits, and side effects of the above medication and the patient was agreeable with the plan.    Follow up in 6-8 weeks.  The patient was instructed to call clinic as needed or go to ER if in crisis.  Patient was instructed to immediately call 911 or come to the nearest emergency room for thoughts to harm self or others.

## 2018-07-02 DIAGNOSIS — F43.10 POST TRAUMATIC STRESS DISORDER (PTSD): ICD-10-CM

## 2018-07-02 DIAGNOSIS — F33.3 SEVERE EPISODE OF RECURRENT MAJOR DEPRESSIVE DISORDER, WITH PSYCHOTIC FEATURES (HCC): ICD-10-CM

## 2018-07-02 RX ORDER — MIRTAZAPINE 45 MG/1
45 TABLET, FILM COATED ORAL NIGHTLY
Qty: 30 TABLET | Refills: 1 | Status: SHIPPED | OUTPATIENT
Start: 2018-07-02 | End: 2018-08-30 | Stop reason: SDUPTHER

## 2018-07-02 RX ORDER — TRAZODONE HYDROCHLORIDE 50 MG/1
12.5-25 TABLET ORAL NIGHTLY
Qty: 30 TABLET | Refills: 0 | Status: SHIPPED | OUTPATIENT
Start: 2018-07-02 | End: 2018-12-04 | Stop reason: SDUPTHER

## 2018-07-02 RX ORDER — PRAZOSIN HYDROCHLORIDE 2 MG/1
4 CAPSULE ORAL NIGHTLY
Qty: 60 CAPSULE | Refills: 1 | Status: SHIPPED | OUTPATIENT
Start: 2018-07-02 | End: 2018-08-30 | Stop reason: SDUPTHER

## 2018-07-02 RX ORDER — BUSPIRONE HYDROCHLORIDE 15 MG/1
15 TABLET ORAL 3 TIMES DAILY
Qty: 120 TABLET | Refills: 1 | Status: SHIPPED | OUTPATIENT
Start: 2018-07-02 | End: 2018-08-30 | Stop reason: SDUPTHER

## 2018-07-02 RX ORDER — OLANZAPINE 20 MG/1
20 TABLET ORAL NIGHTLY
Qty: 30 TABLET | Refills: 1 | Status: SHIPPED | OUTPATIENT
Start: 2018-07-02 | End: 2018-08-30 | Stop reason: SDUPTHER

## 2018-07-02 RX ORDER — OXCARBAZEPINE 600 MG/1
600 TABLET, FILM COATED ORAL EVERY 12 HOURS SCHEDULED
Qty: 60 TABLET | Refills: 1 | Status: SHIPPED | OUTPATIENT
Start: 2018-07-02 | End: 2018-08-30 | Stop reason: SDUPTHER

## 2018-07-02 RX ORDER — FLUOXETINE HYDROCHLORIDE 20 MG/1
60 CAPSULE ORAL DAILY
Qty: 90 CAPSULE | Refills: 1 | Status: SHIPPED | OUTPATIENT
Start: 2018-07-02 | End: 2018-08-27 | Stop reason: SDUPTHER

## 2018-08-27 RX ORDER — FLUOXETINE HYDROCHLORIDE 20 MG/1
CAPSULE ORAL
Qty: 90 CAPSULE | Refills: 1 | Status: SHIPPED | OUTPATIENT
Start: 2018-08-27 | End: 2018-08-30 | Stop reason: SDUPTHER

## 2018-08-30 DIAGNOSIS — F33.3 SEVERE EPISODE OF RECURRENT MAJOR DEPRESSIVE DISORDER, WITH PSYCHOTIC FEATURES (HCC): ICD-10-CM

## 2018-08-30 DIAGNOSIS — F43.10 POST TRAUMATIC STRESS DISORDER (PTSD): ICD-10-CM

## 2018-08-31 RX ORDER — PRAZOSIN HYDROCHLORIDE 2 MG/1
4 CAPSULE ORAL NIGHTLY
Qty: 60 CAPSULE | Refills: 1 | Status: SHIPPED | OUTPATIENT
Start: 2018-08-31 | End: 2018-12-04 | Stop reason: SDUPTHER

## 2018-08-31 RX ORDER — OLANZAPINE 20 MG/1
20 TABLET ORAL NIGHTLY
Qty: 30 TABLET | Refills: 1 | Status: SHIPPED | OUTPATIENT
Start: 2018-08-31 | End: 2018-12-04 | Stop reason: SDUPTHER

## 2018-08-31 RX ORDER — FLUOXETINE HYDROCHLORIDE 20 MG/1
60 CAPSULE ORAL DAILY
Qty: 90 CAPSULE | Refills: 1 | Status: SHIPPED | OUTPATIENT
Start: 2018-08-31 | End: 2018-12-04 | Stop reason: SDUPTHER

## 2018-08-31 RX ORDER — BUSPIRONE HYDROCHLORIDE 15 MG/1
15 TABLET ORAL 3 TIMES DAILY
Qty: 120 TABLET | Refills: 1 | Status: SHIPPED | OUTPATIENT
Start: 2018-08-31 | End: 2018-12-04 | Stop reason: SDUPTHER

## 2018-08-31 RX ORDER — MIRTAZAPINE 45 MG/1
45 TABLET, FILM COATED ORAL NIGHTLY
Qty: 30 TABLET | Refills: 1 | Status: SHIPPED | OUTPATIENT
Start: 2018-08-31 | End: 2018-12-04 | Stop reason: SDUPTHER

## 2018-08-31 RX ORDER — OXCARBAZEPINE 600 MG/1
600 TABLET, FILM COATED ORAL EVERY 12 HOURS SCHEDULED
Qty: 60 TABLET | Refills: 1 | Status: SHIPPED | OUTPATIENT
Start: 2018-08-31 | End: 2018-12-04 | Stop reason: SDUPTHER

## 2018-12-04 ENCOUNTER — OFFICE VISIT (OUTPATIENT)
Dept: PSYCHIATRY | Facility: CLINIC | Age: 48
End: 2018-12-04

## 2018-12-04 VITALS
DIASTOLIC BLOOD PRESSURE: 100 MMHG | WEIGHT: 180 LBS | SYSTOLIC BLOOD PRESSURE: 185 MMHG | BODY MASS INDEX: 26.66 KG/M2 | HEIGHT: 69 IN | HEART RATE: 56 BPM

## 2018-12-04 DIAGNOSIS — F33.3 SEVERE EPISODE OF RECURRENT MAJOR DEPRESSIVE DISORDER, WITH PSYCHOTIC FEATURES (HCC): Primary | ICD-10-CM

## 2018-12-04 DIAGNOSIS — Z79.899 MEDICATION MANAGEMENT: ICD-10-CM

## 2018-12-04 DIAGNOSIS — F43.10 POST TRAUMATIC STRESS DISORDER (PTSD): ICD-10-CM

## 2018-12-04 LAB
AMPHETAMINE CUT-OFF: 1000
BENZODIAZIPINE CUT-OFF: 300
BUPRENORPHINE CUT-OFF: 10
COCAINE CUT-OFF: 300
EXTERNAL AMPHETAMINE SCREEN URINE: NEGATIVE
EXTERNAL BENZODIAZEPINE SCREEN URINE: NEGATIVE
EXTERNAL BUPRENORPHINE SCREEN URINE: NEGATIVE
EXTERNAL COCAINE SCREEN URINE: NEGATIVE
EXTERNAL MDMA: NEGATIVE
EXTERNAL METHADONE SCREEN URINE: NEGATIVE
EXTERNAL METHAMPHETAMINE SCREEN URINE: NEGATIVE
EXTERNAL OPIATES SCREEN URINE: NEGATIVE
EXTERNAL OXYCODONE SCREEN URINE: NEGATIVE
EXTERNAL THC SCREEN URINE: POSITIVE
MDMA CUT-OFF: 500
METHADONE CUT-OFF: 300
METHAMPHETAMINE CUT-OFF: 1000
OPIATES CUT-OFF: 300
OXYCODONE CUT-OFF: 100
THC CUT-OFF: 50

## 2018-12-04 PROCEDURE — 99214 OFFICE O/P EST MOD 30 MIN: CPT | Performed by: NURSE PRACTITIONER

## 2018-12-04 RX ORDER — PRAZOSIN HYDROCHLORIDE 2 MG/1
4 CAPSULE ORAL NIGHTLY
Qty: 60 CAPSULE | Refills: 1 | Status: SHIPPED | OUTPATIENT
Start: 2018-12-04 | End: 2019-02-11 | Stop reason: SDUPTHER

## 2018-12-04 RX ORDER — FLUOXETINE HYDROCHLORIDE 20 MG/1
60 CAPSULE ORAL DAILY
Qty: 90 CAPSULE | Refills: 1 | Status: SHIPPED | OUTPATIENT
Start: 2018-12-04 | End: 2019-01-25 | Stop reason: SDUPTHER

## 2018-12-04 RX ORDER — OLANZAPINE 20 MG/1
20 TABLET ORAL NIGHTLY
Qty: 30 TABLET | Refills: 1 | Status: SHIPPED | OUTPATIENT
Start: 2018-12-04 | End: 2019-02-11 | Stop reason: SDUPTHER

## 2018-12-04 RX ORDER — TRAZODONE HYDROCHLORIDE 50 MG/1
12.5-25 TABLET ORAL NIGHTLY
Qty: 30 TABLET | Refills: 0 | Status: SHIPPED | OUTPATIENT
Start: 2018-12-04 | End: 2019-03-07 | Stop reason: SDUPTHER

## 2018-12-04 RX ORDER — OXCARBAZEPINE 600 MG/1
600 TABLET, FILM COATED ORAL EVERY 12 HOURS SCHEDULED
Qty: 60 TABLET | Refills: 1 | Status: SHIPPED | OUTPATIENT
Start: 2018-12-04 | End: 2019-03-07 | Stop reason: SDUPTHER

## 2018-12-04 RX ORDER — MIRTAZAPINE 45 MG/1
45 TABLET, FILM COATED ORAL NIGHTLY
Qty: 30 TABLET | Refills: 1 | Status: SHIPPED | OUTPATIENT
Start: 2018-12-04 | End: 2019-02-11 | Stop reason: SDUPTHER

## 2018-12-04 RX ORDER — BUSPIRONE HYDROCHLORIDE 15 MG/1
15 TABLET ORAL 3 TIMES DAILY
Qty: 120 TABLET | Refills: 1 | Status: SHIPPED | OUTPATIENT
Start: 2018-12-04 | End: 2019-02-11 | Stop reason: SDUPTHER

## 2018-12-04 NOTE — PROGRESS NOTES
Subjective   Deni Lopez is a 48 y.o. male who is here today for medication management follow up. with me for the first time.     Chief Complaint:     HPI: History of Present Illness   Patient comes in today after not being seen since May 2018. He has been doing good and stated the buspar has helped him with his anxiety. Patient states he does smoke marijuana on occasion for anxiety and it does help. He is sleeping 6 hours at night and feels his medicine is helping him well. Patient was able to verbalize the Prozac has been helpful with his depression and anxiety. He reports that he is still having nightmares 2-3x a week but that are not as vivid. The cold weather has made him isolate but he states his wife has got him out of the house and he is still hunting and riding his fourwheeler. He currenlty raptes his depression a 6 out of 10 with 10 being the worst and rates his anxiety the same. Patient feels he is able to cope better and some days are better with his depression. He wishes to not be on a lot of medication and feels he is doing well and coping with it especially during the winter.  The patient is having some depressed mood, low energy, difficulty concentrating, which have caused impairment in important areas of functioning, but he feels he is coping with it.  The patient did consistently and adamantly denying any suicidal intent or planning.  Patient states he would never harm himself because of his children.  The patient reports the following symptoms of anxiety: constant anxiety/worry, restlessness/on edge, difficulty concentrating, irritability and anxiety causes distress/impairment in important areas of functioning.  Appetite is good weight is stable. He feels good with the amount of sleep he is getting and states his anxiety has been better as well with the Buspar and well as marijuana. Patient was able to express that he was more nervous today with seeing a new provide than usual.  Patient denies  "any side effects to medications.  Patient denies any suicidal or homicidal ideation or auditory or visual hallucinations.      The following portions of the patient's history were reviewed and updated as appropriate: allergies, current medications, past family history, past medical history, past social history, past surgical history and problem list.    Review of Systems   Constitutional: Positive for fatigue.   Psychiatric/Behavioral: Positive for agitation and sleep disturbance. Negative for behavioral problems, confusion, decreased concentration, hallucinations, self-injury and suicidal ideas. The patient is nervous/anxious. The patient is not hyperactive.      Patient denies any recent medical illnesses.  No acute cardiopulmonary symptoms evident.  No acute gastrointestinal complaints.  Patient's appetite and intake are adequate.  Patient's current weight compared with last weight.    Objective   Physical Exam   Constitutional: He appears well-developed and well-nourished.   Psychiatric: His speech is normal and behavior is normal. Judgment and thought content normal. His mood appears anxious. Cognition and memory are normal.     Blood pressure (!) 185/100, pulse 56, height 175.3 cm (69.02\"), weight 81.6 kg (180 lb). Body mass index is 26.57 kg/m².      Allergies   Allergen Reactions   • Codeine Sulfate Hives       Current Medications:   Current Outpatient Medications   Medication Sig Dispense Refill   • busPIRone (BUSPAR) 15 MG tablet Take 1 tablet by mouth 3 (Three) Times a Day. 120 tablet 1   • cloNIDine (CATAPRES) 0.1 MG tablet      • FLUoxetine (PROzac) 20 MG capsule Take 3 capsules by mouth Daily. 90 capsule 1   • ibuprofen (ADVIL,MOTRIN) 600 MG tablet   0   • metoprolol tartrate (LOPRESSOR) 25 MG tablet 2 (Two) Times a Day.     • mirtazapine (REMERON) 45 MG tablet Take 1 tablet by mouth Every Night. 30 tablet 1   • OLANZapine (zyPREXA) 20 MG tablet Take 1 tablet by mouth Every Night. 30 tablet 1   • " OXcarbazepine (TRILEPTAL) 600 MG tablet Take 1 tablet by mouth Every 12 (Twelve) Hours. 60 tablet 1   • pantoprazole (PROTONIX) 40 MG EC tablet Daily.     • prazosin (MINIPRESS) 2 MG capsule Take 2 capsules by mouth Every Night. 60 capsule 1   • traZODone (DESYREL) 50 MG tablet Take 0.25-0.5 tablets by mouth Every Night. 30 tablet 0     No current facility-administered medications for this visit.        Mental Status Exam:   Hygiene:   fair  Cooperation:  Cooperative  Eye Contact:  Good  Psychomotor Behavior:  Appropriate  Affect:  Full range  Hopelessness: Denies  Speech:  Normal  Thought Process:  Goal directed and Linear  Thought Content:  Mood congurent  Suicidal:  None  Homicidal:  None  Hallucinations:  None  Delusion:  None  Memory:  Intact  Orientation:  Person, Place, Time and Situation  Reliability:  good  Insight:  Good  Judgement:  Good  Impulse Control:  Good  Physical/Medical Issues:  Yes hpt    Assessment/Plan   Diagnoses and all orders for this visit:    Severe episode of recurrent major depressive disorder, with psychotic features (CMS/HCC)  -     busPIRone (BUSPAR) 15 MG tablet; Take 1 tablet by mouth 3 (Three) Times a Day.  -     mirtazapine (REMERON) 45 MG tablet; Take 1 tablet by mouth Every Night.  -     OLANZapine (zyPREXA) 20 MG tablet; Take 1 tablet by mouth Every Night.  -     OXcarbazepine (TRILEPTAL) 600 MG tablet; Take 1 tablet by mouth Every 12 (Twelve) Hours.  -     prazosin (MINIPRESS) 2 MG capsule; Take 2 capsules by mouth Every Night.  -     traZODone (DESYREL) 50 MG tablet; Take 0.25-0.5 tablets by mouth Every Night.    Post traumatic stress disorder (PTSD)  -     busPIRone (BUSPAR) 15 MG tablet; Take 1 tablet by mouth 3 (Three) Times a Day.  -     mirtazapine (REMERON) 45 MG tablet; Take 1 tablet by mouth Every Night.  -     OLANZapine (zyPREXA) 20 MG tablet; Take 1 tablet by mouth Every Night.  -     OXcarbazepine (TRILEPTAL) 600 MG tablet; Take 1 tablet by mouth Every 12  (Twelve) Hours.  -     prazosin (MINIPRESS) 2 MG capsule; Take 2 capsules by mouth Every Night.    Medication management  -     [facility] Drug Screen    Other orders  -     FLUoxetine (PROzac) 20 MG capsule; Take 3 capsules by mouth Daily.    Patient will be continued on medication as he feels he is coping well and denies and side effects.  Discussed patient's blood pressure today as he was nervous but has noted that he has been elevated more than usual.  Patient states that he is switching providers and will be seen a new PCP next week in [place] in which she will be getting blood work and medication changes for his blood pressure. He was reminded on several occasions to immediately come to the hospital should he have any thoughts to harm himself or others. We discussed risks, benefits, and side effects of the above medication and the patient was agreeable with the plan.  25 minute spent face-to-face with patient discussing medications and any side effects and establishing new rapport with patient.  Discuss coping methods and encourage patient to continue to get out and not isolate during the winter months.    Follow up in 8 weeks.  The patient was instructed to call clinic as needed or go to ER if in crisis.  Patient was instructed to immediately call 911 or come to the nearest emergency room for thoughts to harm self or others.

## 2019-01-25 RX ORDER — FLUOXETINE HYDROCHLORIDE 20 MG/1
60 CAPSULE ORAL DAILY
Qty: 90 CAPSULE | Refills: 1 | Status: SHIPPED | OUTPATIENT
Start: 2019-01-25 | End: 2019-02-11 | Stop reason: SDUPTHER

## 2019-02-11 DIAGNOSIS — F43.10 POST TRAUMATIC STRESS DISORDER (PTSD): ICD-10-CM

## 2019-02-11 DIAGNOSIS — F33.3 SEVERE EPISODE OF RECURRENT MAJOR DEPRESSIVE DISORDER, WITH PSYCHOTIC FEATURES (HCC): ICD-10-CM

## 2019-02-11 NOTE — TELEPHONE ENCOUNTER
PATIENT needs all of his meds refilled due to being rescheduled today. Patient just started on  metropolol and lisinopril from his PCP

## 2019-02-13 RX ORDER — PRAZOSIN HYDROCHLORIDE 2 MG/1
CAPSULE ORAL
Qty: 60 CAPSULE | Refills: 1 | Status: SHIPPED | OUTPATIENT
Start: 2019-02-13 | End: 2019-03-07 | Stop reason: SDUPTHER

## 2019-02-13 RX ORDER — FLUOXETINE HYDROCHLORIDE 20 MG/1
60 CAPSULE ORAL DAILY
Qty: 90 CAPSULE | Refills: 0 | Status: SHIPPED | OUTPATIENT
Start: 2019-02-13 | End: 2019-03-07 | Stop reason: SDUPTHER

## 2019-02-13 RX ORDER — MIRTAZAPINE 45 MG/1
45 TABLET, FILM COATED ORAL NIGHTLY
Qty: 30 TABLET | Refills: 0 | Status: SHIPPED | OUTPATIENT
Start: 2019-02-13 | End: 2019-03-07 | Stop reason: SDUPTHER

## 2019-02-13 RX ORDER — BUSPIRONE HYDROCHLORIDE 15 MG/1
15 TABLET ORAL 3 TIMES DAILY
Qty: 120 TABLET | Refills: 0 | Status: SHIPPED | OUTPATIENT
Start: 2019-02-13 | End: 2019-03-07 | Stop reason: SDUPTHER

## 2019-02-13 RX ORDER — CLONIDINE HYDROCHLORIDE 0.1 MG/1
0.1 TABLET ORAL NIGHTLY
Qty: 30 TABLET | Refills: 0 | Status: SHIPPED | OUTPATIENT
Start: 2019-02-13 | End: 2019-03-07 | Stop reason: SDUPTHER

## 2019-02-13 RX ORDER — OLANZAPINE 20 MG/1
20 TABLET ORAL NIGHTLY
Qty: 30 TABLET | Refills: 0 | Status: SHIPPED | OUTPATIENT
Start: 2019-02-13 | End: 2019-03-07 | Stop reason: SDUPTHER

## 2019-03-07 ENCOUNTER — OFFICE VISIT (OUTPATIENT)
Dept: PSYCHIATRY | Facility: CLINIC | Age: 49
End: 2019-03-07

## 2019-03-07 VITALS
BODY MASS INDEX: 26.9 KG/M2 | HEIGHT: 69 IN | HEART RATE: 58 BPM | WEIGHT: 181.6 LBS | SYSTOLIC BLOOD PRESSURE: 157 MMHG | DIASTOLIC BLOOD PRESSURE: 100 MMHG

## 2019-03-07 DIAGNOSIS — F33.3 SEVERE EPISODE OF RECURRENT MAJOR DEPRESSIVE DISORDER, WITH PSYCHOTIC FEATURES (HCC): ICD-10-CM

## 2019-03-07 DIAGNOSIS — F43.10 POST TRAUMATIC STRESS DISORDER (PTSD): ICD-10-CM

## 2019-03-07 PROCEDURE — 99214 OFFICE O/P EST MOD 30 MIN: CPT | Performed by: NURSE PRACTITIONER

## 2019-03-07 PROCEDURE — 99406 BEHAV CHNG SMOKING 3-10 MIN: CPT | Performed by: NURSE PRACTITIONER

## 2019-03-07 RX ORDER — CLONIDINE HYDROCHLORIDE 0.1 MG/1
0.1 TABLET ORAL NIGHTLY
Qty: 30 TABLET | Refills: 1 | Status: SHIPPED | OUTPATIENT
Start: 2019-03-07 | End: 2019-05-02 | Stop reason: SDUPTHER

## 2019-03-07 RX ORDER — BUSPIRONE HYDROCHLORIDE 15 MG/1
15 TABLET ORAL 3 TIMES DAILY
Qty: 120 TABLET | Refills: 1 | Status: SHIPPED | OUTPATIENT
Start: 2019-03-07 | End: 2019-05-02 | Stop reason: SDUPTHER

## 2019-03-07 RX ORDER — OXCARBAZEPINE 600 MG/1
600 TABLET, FILM COATED ORAL EVERY 12 HOURS SCHEDULED
Qty: 60 TABLET | Refills: 1 | Status: SHIPPED | OUTPATIENT
Start: 2019-03-07 | End: 2019-05-02 | Stop reason: SDUPTHER

## 2019-03-07 RX ORDER — TRAZODONE HYDROCHLORIDE 50 MG/1
12.5-25 TABLET ORAL NIGHTLY
Qty: 30 TABLET | Refills: 1 | Status: SHIPPED | OUTPATIENT
Start: 2019-03-07 | End: 2019-05-02 | Stop reason: SDUPTHER

## 2019-03-07 RX ORDER — METOPROLOL SUCCINATE 50 MG/1
50 TABLET, EXTENDED RELEASE ORAL DAILY
Refills: 0 | COMMUNITY
Start: 2019-02-21 | End: 2020-11-24 | Stop reason: SDUPTHER

## 2019-03-07 RX ORDER — MIRTAZAPINE 45 MG/1
45 TABLET, FILM COATED ORAL NIGHTLY
Qty: 30 TABLET | Refills: 1 | Status: SHIPPED | OUTPATIENT
Start: 2019-03-07 | End: 2019-05-02 | Stop reason: SDUPTHER

## 2019-03-07 RX ORDER — PRAZOSIN HYDROCHLORIDE 2 MG/1
4 CAPSULE ORAL
Qty: 60 CAPSULE | Refills: 1 | Status: SHIPPED | OUTPATIENT
Start: 2019-03-07 | End: 2019-05-02 | Stop reason: SDUPTHER

## 2019-03-07 RX ORDER — LISINOPRIL 10 MG/1
10 TABLET ORAL DAILY
Refills: 0 | COMMUNITY
Start: 2019-02-21 | End: 2020-11-24 | Stop reason: SDUPTHER

## 2019-03-07 RX ORDER — OLANZAPINE 20 MG/1
20 TABLET ORAL NIGHTLY
Qty: 30 TABLET | Refills: 1 | Status: SHIPPED | OUTPATIENT
Start: 2019-03-07 | End: 2019-05-02 | Stop reason: SDUPTHER

## 2019-03-07 RX ORDER — FLUOXETINE HYDROCHLORIDE 20 MG/1
60 CAPSULE ORAL DAILY
Qty: 90 CAPSULE | Refills: 1 | Status: SHIPPED | OUTPATIENT
Start: 2019-03-07 | End: 2019-05-02 | Stop reason: SDUPTHER

## 2019-03-07 NOTE — PROGRESS NOTES
Subjective   Deni Lopez is a 48 y.o. male who is here today for medication management follow up. with me for the first time.     Chief Complaint: follow-up for depression    HPI: History of Present Illness   Patient presents today after not being seen since December.  Patient states that he has been feeling good on his medication and he feels currently stabilized.  Patient states that his anxiety has been doing well and currently rates it a 5 out of 10 on a scale of 0-10 with 10 being the worst.  Patient does report that he has had a cold which has lasted almost 2 weeks for him but states he is slowly getting better but it has made him feel fatigued and weakened.  Patient states that his depression is doing well and currently rates at a 4-5 on a scale of 0-10 with 10 being the worst.  Patient feels that he is able to cope with his depression and states things at home are going well.  Patient reports that he goes to sleep easily but after about 3-4 hours he does wake up and cannot go back to sleep so he will ride [place] 4 [place] as he states he enjoys that no matter what time of season..  Patient states that since trying the trazodone he is able to stay asleep longer at least 5-7 hours which is helpful with him feeling rested.  Patient states that prior to this he would work 16-hour days and only function on 4 hours of sleep before so he is trying to get back into a normal routine of more sleep but states he is doing well.  Patient denies any side effects to the medications.  Patient states that he recently had a change in his blood pressure medication for his PCP and that it has improved slightly.  He does report that he smokes marijuana occasionally but not often since he has been doing better on his medication he has not used as much.  Patient states that his agitation and irritability have done well on the medication.  Patient states his appetite has been good minus the cold that he has  "had.  Patient adamantly denies any SI or HI, patient states that if he felt he was getting worse that he would go to the ER but that he feels stable on his current medication regimen.  Patient denies any auditory or visual hallucinations.        The following portions of the patient's history were reviewed and updated as appropriate: allergies, current medications, past family history, past medical history, past social history, past surgical history and problem list.    Review of Systems   Constitutional: Positive for fatigue.   HENT: Positive for congestion.    Psychiatric/Behavioral: Positive for dysphoric mood and sleep disturbance. Negative for agitation, behavioral problems, confusion, decreased concentration, hallucinations, self-injury and suicidal ideas. The patient is nervous/anxious. The patient is not hyperactive.      Patient denies any recent medical illnesses.  No acute cardiopulmonary symptoms evident.  No acute gastrointestinal complaints.  Patient's appetite and intake are adequate.  Patient's current weight compared with last weight.    Objective   Physical Exam   Constitutional: He appears well-developed and well-nourished.   Psychiatric: His speech is normal and behavior is normal. Judgment and thought content normal. His mood appears anxious. Cognition and memory are normal. He exhibits a depressed mood.   Nursing note and vitals reviewed.    Blood pressure 157/100, pulse 58, height 175.3 cm (69.02\"), weight 82.4 kg (181 lb 9.6 oz). Body mass index is 26.8 kg/m².      Allergies   Allergen Reactions   • Codeine Sulfate Hives       Current Medications:   Current Outpatient Medications   Medication Sig Dispense Refill   • busPIRone (BUSPAR) 15 MG tablet Take 1 tablet by mouth 3 (Three) Times a Day. 120 tablet 1   • CloNIDine (CATAPRES) 0.1 MG tablet Take 1 tablet by mouth Every Night. 30 tablet 1   • FLUoxetine (PROzac) 20 MG capsule Take 3 capsules by mouth Daily. 90 capsule 1   • ibuprofen " (ADVIL,MOTRIN) 600 MG tablet   0   • lisinopril (PRINIVIL,ZESTRIL) 10 MG tablet Take 10 mg by mouth Daily.  0   • metoprolol succinate XL (TOPROL-XL) 50 MG 24 hr tablet Take 50 mg by mouth Daily.  0   • mirtazapine (REMERON) 45 MG tablet Take 1 tablet by mouth Every Night. 30 tablet 1   • OLANZapine (zyPREXA) 20 MG tablet Take 1 tablet by mouth Every Night. 30 tablet 1   • OXcarbazepine (TRILEPTAL) 600 MG tablet Take 1 tablet by mouth Every 12 (Twelve) Hours. 60 tablet 1   • prazosin (MINIPRESS) 2 MG capsule Take 2 capsules by mouth every night at bedtime. 60 capsule 1   • traZODone (DESYREL) 50 MG tablet Take 0.25-0.5 tablets by mouth Every Night. 30 tablet 1   • metoprolol tartrate (LOPRESSOR) 25 MG tablet 2 (Two) Times a Day.     • pantoprazole (PROTONIX) 40 MG EC tablet Daily.       No current facility-administered medications for this visit.        Mental Status Exam:   Hygiene:   fair  Cooperation:  Cooperative  Eye Contact:  Good  Psychomotor Behavior:  Appropriate  Affect:  Full range  Hopelessness: Denies  Speech:  Normal  Thought Process:  Goal directed and Linear  Thought Content:  Mood congurent  Suicidal:  None  Homicidal:  None  Hallucinations:  None  Delusion:  None  Memory:  Intact  Orientation:  Person, Place, Time and Situation  Reliability:  good  Insight:  Good  Judgement:  Good  Impulse Control:  Good  Physical/Medical Issues:  Yes hpt    Assessment/Plan   Diagnoses and all orders for this visit:    Severe episode of recurrent major depressive disorder, with psychotic features (CMS/HCC)  -     busPIRone (BUSPAR) 15 MG tablet; Take 1 tablet by mouth 3 (Three) Times a Day.  -     FLUoxetine (PROzac) 20 MG capsule; Take 3 capsules by mouth Daily.  -     mirtazapine (REMERON) 45 MG tablet; Take 1 tablet by mouth Every Night.  -     OLANZapine (zyPREXA) 20 MG tablet; Take 1 tablet by mouth Every Night.  -     OXcarbazepine (TRILEPTAL) 600 MG tablet; Take 1 tablet by mouth Every 12 (Twelve) Hours.  -      prazosin (MINIPRESS) 2 MG capsule; Take 2 capsules by mouth every night at bedtime.  -     traZODone (DESYREL) 50 MG tablet; Take 0.25-0.5 tablets by mouth Every Night.    Post traumatic stress disorder (PTSD)  -     busPIRone (BUSPAR) 15 MG tablet; Take 1 tablet by mouth 3 (Three) Times a Day.  -     CloNIDine (CATAPRES) 0.1 MG tablet; Take 1 tablet by mouth Every Night.  -     FLUoxetine (PROzac) 20 MG capsule; Take 3 capsules by mouth Daily.  -     mirtazapine (REMERON) 45 MG tablet; Take 1 tablet by mouth Every Night.  -     OLANZapine (zyPREXA) 20 MG tablet; Take 1 tablet by mouth Every Night.  -     OXcarbazepine (TRILEPTAL) 600 MG tablet; Take 1 tablet by mouth Every 12 (Twelve) Hours.  -     prazosin (MINIPRESS) 2 MG capsule; Take 2 capsules by mouth every night at bedtime.      25 minutes spent face-to-face with patient discussing his medications as well as any potential side effects.  Patient will be continued on medication as he feels he is coping well and denies any side effects.  Discussed patient's blood pressure today as it has improved slightly since the medication change which is only occurred in the last couple of weeks. He was reminded on several occasions to immediately come to the hospital should he have any thoughts to harm himself or others. We discussed risks, benefits, and side effects of the above medication and the patient was agreeable with the plan. Discussed coping methods and encourage patient to continue to get out and not isolate during the winter months, patient states that he has still been getting out and riding his 4 guardado even with the cold weather.  Patient reports that things are going well with his wife. Prognosis: Guarded dependent on medication/follow up and treatment plan compliance. Functionality: pt showing improvements in important areas of daily functioning.    [address]  5 minutes spent with patient discussing smoking cessation tips and advice.  Encourage patient as  he decreases in smoking and stop as it could help with his blood pressure as well as the amount of medications that he is on to control his blood pressure.      Follow up in 8 weeks, informed patient that if his symptoms worsen he can call for sooner appointment.  The patient was instructed to call clinic as needed or go to ER if in crisis.  Patient was instructed to immediately call 911 or come to the nearest emergency room for thoughts to harm self or others.    Errors in dictation may reflect use of voice recognition software and not all errors in transcription may have been detected prior to signing.

## 2019-05-02 ENCOUNTER — OFFICE VISIT (OUTPATIENT)
Dept: PSYCHIATRY | Facility: CLINIC | Age: 49
End: 2019-05-02

## 2019-05-02 VITALS
WEIGHT: 175.4 LBS | HEART RATE: 60 BPM | DIASTOLIC BLOOD PRESSURE: 96 MMHG | SYSTOLIC BLOOD PRESSURE: 166 MMHG | HEIGHT: 69 IN | BODY MASS INDEX: 25.98 KG/M2

## 2019-05-02 DIAGNOSIS — F33.3 SEVERE EPISODE OF RECURRENT MAJOR DEPRESSIVE DISORDER, WITH PSYCHOTIC FEATURES (HCC): ICD-10-CM

## 2019-05-02 DIAGNOSIS — F43.10 POST TRAUMATIC STRESS DISORDER (PTSD): ICD-10-CM

## 2019-05-02 PROCEDURE — 99213 OFFICE O/P EST LOW 20 MIN: CPT | Performed by: NURSE PRACTITIONER

## 2019-05-02 RX ORDER — TRAZODONE HYDROCHLORIDE 50 MG/1
25-50 TABLET ORAL NIGHTLY
Qty: 90 TABLET | Refills: 0 | Status: SHIPPED | OUTPATIENT
Start: 2019-05-02 | End: 2019-07-25 | Stop reason: SDUPTHER

## 2019-05-02 RX ORDER — FLUOXETINE HYDROCHLORIDE 20 MG/1
60 CAPSULE ORAL DAILY
Qty: 90 CAPSULE | Refills: 2 | Status: SHIPPED | OUTPATIENT
Start: 2019-05-02 | End: 2019-07-25 | Stop reason: SDUPTHER

## 2019-05-02 RX ORDER — PRAZOSIN HYDROCHLORIDE 2 MG/1
4 CAPSULE ORAL
Qty: 60 CAPSULE | Refills: 2 | Status: SHIPPED | OUTPATIENT
Start: 2019-05-02 | End: 2019-07-25 | Stop reason: SDUPTHER

## 2019-05-02 RX ORDER — OLANZAPINE 20 MG/1
20 TABLET ORAL NIGHTLY
Qty: 30 TABLET | Refills: 2 | Status: SHIPPED | OUTPATIENT
Start: 2019-05-02 | End: 2019-07-25 | Stop reason: SDUPTHER

## 2019-05-02 RX ORDER — MIRTAZAPINE 45 MG/1
45 TABLET, FILM COATED ORAL NIGHTLY
Qty: 90 TABLET | Refills: 0 | Status: SHIPPED | OUTPATIENT
Start: 2019-05-02 | End: 2019-07-25 | Stop reason: SDUPTHER

## 2019-05-02 RX ORDER — BUSPIRONE HYDROCHLORIDE 15 MG/1
15 TABLET ORAL 3 TIMES DAILY
Qty: 120 TABLET | Refills: 2 | Status: SHIPPED | OUTPATIENT
Start: 2019-05-02 | End: 2019-07-25 | Stop reason: SDUPTHER

## 2019-05-02 RX ORDER — CLONIDINE HYDROCHLORIDE 0.1 MG/1
0.1 TABLET ORAL NIGHTLY
Qty: 30 TABLET | Refills: 2 | Status: SHIPPED | OUTPATIENT
Start: 2019-05-02 | End: 2019-07-25

## 2019-05-02 RX ORDER — OXCARBAZEPINE 600 MG/1
600 TABLET, FILM COATED ORAL EVERY 12 HOURS SCHEDULED
Qty: 60 TABLET | Refills: 2 | Status: SHIPPED | OUTPATIENT
Start: 2019-05-02 | End: 2019-07-25 | Stop reason: SDUPTHER

## 2019-05-02 NOTE — PROGRESS NOTES
Subjective   Deni Lopez is a 48 y.o. male who is here today for medication management follow up.     Chief Complaint: follow-up for depression    HPI: History of Present Illness   Patient comes in today reporting that he has been good. Patient reports that he lost aunt but he states he was able to cope with that good. He reports he is trying to cut back on smoking but it is difficult for him.  He reports that things have been going well for him.  He currently feels that he has stabilized on the medication regimen.  He reports that he only takes the trazodone as needed if he has any difficulty with sleeping and is only been taking half.  Patient currently rates his depression and anxiety a 4 out of 10 on a scale of 0-10 with 10 being the worst.  Patient states that he has been able to cope with things and does not feel as his depression is interfering with anything.  Patient still rates that he has anxiety but he is able to overcome it and cope well.  Patient denies any side effects with his current medications.  Patient reports that he is trying to get his blood pressure under control and that he will getting lab work from his PCP this week.  Patient reports that he is sleeping 5-6 hours every night and feels rested.  Patient states that his appetite has been good.  Patient reports that he is staying active and getting outside and fishing as well as riding his 4 guardado.  Patient adamantly denies any SI or HI.  Patient denies any auditory visual hallucinations.        The following portions of the patient's history were reviewed and updated as appropriate: allergies, current medications, past family history, past medical history, past social history, past surgical history and problem list.    Review of Systems   Constitutional: Negative for fatigue.   HENT: Negative for congestion.    Psychiatric/Behavioral: Positive for dysphoric mood. Negative for agitation, behavioral problems, confusion, decreased  "concentration, hallucinations, self-injury, sleep disturbance and suicidal ideas. The patient is nervous/anxious. The patient is not hyperactive.    All other systems reviewed and are negative.    Patient denies any recent medical illnesses.  No acute cardiopulmonary symptoms evident.  No acute gastrointestinal complaints.  Patient's appetite and intake are adequate.  Patient's current weight compared with last weight.    Objective   Physical Exam   Constitutional: He appears well-developed and well-nourished.   Psychiatric: His speech is normal and behavior is normal. Judgment and thought content normal. His mood appears anxious. Cognition and memory are normal. He exhibits a depressed mood.   Nursing note and vitals reviewed.    Blood pressure 166/96, pulse 60, height 175.3 cm (69.02\"), weight 79.6 kg (175 lb 6.4 oz). Body mass index is 25.89 kg/m².      Allergies   Allergen Reactions   • Codeine Sulfate Hives       Current Medications:   Current Outpatient Medications   Medication Sig Dispense Refill   • busPIRone (BUSPAR) 15 MG tablet Take 1 tablet by mouth 3 (Three) Times a Day. 120 tablet 2   • CloNIDine (CATAPRES) 0.1 MG tablet Take 1 tablet by mouth Every Night. 30 tablet 2   • FLUoxetine (PROzac) 20 MG capsule Take 3 capsules by mouth Daily. 90 capsule 2   • ibuprofen (ADVIL,MOTRIN) 600 MG tablet   0   • lisinopril (PRINIVIL,ZESTRIL) 10 MG tablet Take 10 mg by mouth Daily.  0   • metoprolol succinate XL (TOPROL-XL) 50 MG 24 hr tablet Take 50 mg by mouth Daily.  0   • metoprolol tartrate (LOPRESSOR) 25 MG tablet 2 (Two) Times a Day.     • mirtazapine (REMERON) 45 MG tablet Take 1 tablet by mouth Every Night. 90 tablet 0   • OLANZapine (zyPREXA) 20 MG tablet Take 1 tablet by mouth Every Night. 30 tablet 2   • OXcarbazepine (TRILEPTAL) 600 MG tablet Take 1 tablet by mouth Every 12 (Twelve) Hours. 60 tablet 2   • pantoprazole (PROTONIX) 40 MG EC tablet Daily.     • prazosin (MINIPRESS) 2 MG capsule Take 2 " capsules by mouth every night at bedtime. 60 capsule 2   • traZODone (DESYREL) 50 MG tablet Take 0.5-1 tablets by mouth Every Night. 90 tablet 0     No current facility-administered medications for this visit.        Mental Status Exam:   Hygiene:   fair  Cooperation:  Cooperative  Eye Contact:  Good  Psychomotor Behavior:  Appropriate  Affect:  Full range  Hopelessness: Denies  Speech:  Normal  Thought Process:  Goal directed and Linear  Thought Content:  Mood congurent  Suicidal:  None  Homicidal:  None  Hallucinations:  None  Delusion:  None  Memory:  Intact  Orientation:  Person, Place, Time and Situation  Reliability:  good  Insight:  Good  Judgement:  Good  Impulse Control:  Good  Physical/Medical Issues:  Yes hpt    Assessment/Plan   Diagnoses and all orders for this visit:    Severe episode of recurrent major depressive disorder, with psychotic features (CMS/HCC)  -     busPIRone (BUSPAR) 15 MG tablet; Take 1 tablet by mouth 3 (Three) Times a Day.  -     FLUoxetine (PROzac) 20 MG capsule; Take 3 capsules by mouth Daily.  -     mirtazapine (REMERON) 45 MG tablet; Take 1 tablet by mouth Every Night.  -     OLANZapine (zyPREXA) 20 MG tablet; Take 1 tablet by mouth Every Night.  -     OXcarbazepine (TRILEPTAL) 600 MG tablet; Take 1 tablet by mouth Every 12 (Twelve) Hours.  -     prazosin (MINIPRESS) 2 MG capsule; Take 2 capsules by mouth every night at bedtime.  -     traZODone (DESYREL) 50 MG tablet; Take 0.5-1 tablets by mouth Every Night.    Post traumatic stress disorder (PTSD)  -     busPIRone (BUSPAR) 15 MG tablet; Take 1 tablet by mouth 3 (Three) Times a Day.  -     CloNIDine (CATAPRES) 0.1 MG tablet; Take 1 tablet by mouth Every Night.  -     FLUoxetine (PROzac) 20 MG capsule; Take 3 capsules by mouth Daily.  -     mirtazapine (REMERON) 45 MG tablet; Take 1 tablet by mouth Every Night.  -     OLANZapine (zyPREXA) 20 MG tablet; Take 1 tablet by mouth Every Night.  -     OXcarbazepine (TRILEPTAL) 600 MG  tablet; Take 1 tablet by mouth Every 12 (Twelve) Hours.  -     prazosin (MINIPRESS) 2 MG capsule; Take 2 capsules by mouth every night at bedtime.      Discussed medication options and side effects.  Patient will be continued on current medication treatment as he feels he is coping well and denies any side effects. Discussed the risks, benefits, and side effects of the medication; client acknowledged and verbally consented.  Patient is aware to contact the [facility] with any worsening of symptom.  Patient is agreeable to go to the ER or call 911 should they begin SI/HI.  Patient denies any side effects to the current medications and feels stabilized.  Patient reports that his PCP will be getting blood work this week, had patient sign consent to obtain lab work as PCP is also following patient's blood pressure and managing this as well.  Patient denies any current medical issues besides high blood pressure.  Patient reports that he is still smoking but he is trying to cut back.  Prognosis: Guarded dependent on medication/follow up and treatment plan compliance.  Functionality: pt showing improvements in important areas of daily functioning.  Patient will follow-up in 3 months, informed patient that if he needs to be seen sooner to contact the [facility] for an earlier appointment patient verbally agreed and consented.  We will obtain lab work from PCP    The patient was instructed to call clinic as needed or go to ER if in crisis.  Patient was instructed to immediately call 911 or come to the nearest emergency room for thoughts to harm self or others.    Errors in dictation may reflect use of voice recognition software and not all errors in transcription may have been detected prior to signing.

## 2019-07-25 ENCOUNTER — OFFICE VISIT (OUTPATIENT)
Dept: PSYCHIATRY | Facility: CLINIC | Age: 49
End: 2019-07-25

## 2019-07-25 VITALS
WEIGHT: 174.6 LBS | SYSTOLIC BLOOD PRESSURE: 170 MMHG | BODY MASS INDEX: 25.86 KG/M2 | DIASTOLIC BLOOD PRESSURE: 106 MMHG | HEIGHT: 69 IN | HEART RATE: 71 BPM

## 2019-07-25 DIAGNOSIS — F12.20 MARIJUANA DEPENDENCE (HCC): ICD-10-CM

## 2019-07-25 DIAGNOSIS — F43.10 POST TRAUMATIC STRESS DISORDER (PTSD): ICD-10-CM

## 2019-07-25 DIAGNOSIS — F33.3 SEVERE EPISODE OF RECURRENT MAJOR DEPRESSIVE DISORDER, WITH PSYCHOTIC FEATURES (HCC): Primary | ICD-10-CM

## 2019-07-25 PROCEDURE — 99214 OFFICE O/P EST MOD 30 MIN: CPT | Performed by: NURSE PRACTITIONER

## 2019-07-25 RX ORDER — OLANZAPINE 20 MG/1
20 TABLET ORAL NIGHTLY
Qty: 30 TABLET | Refills: 2 | Status: SHIPPED | OUTPATIENT
Start: 2019-07-25 | End: 2019-09-17 | Stop reason: SDUPTHER

## 2019-07-25 RX ORDER — FLUOXETINE HYDROCHLORIDE 20 MG/1
CAPSULE ORAL
Qty: 30 CAPSULE | Refills: 0 | Status: SHIPPED | OUTPATIENT
Start: 2019-07-25 | End: 2019-07-25

## 2019-07-25 RX ORDER — TRAZODONE HYDROCHLORIDE 50 MG/1
25-50 TABLET ORAL NIGHTLY
Qty: 90 TABLET | Refills: 0 | Status: SHIPPED | OUTPATIENT
Start: 2019-07-25 | End: 2019-09-17 | Stop reason: SDUPTHER

## 2019-07-25 RX ORDER — MIRTAZAPINE 45 MG/1
45 TABLET, FILM COATED ORAL NIGHTLY
Qty: 90 TABLET | Refills: 0 | Status: SHIPPED | OUTPATIENT
Start: 2019-07-25 | End: 2019-09-17 | Stop reason: SDUPTHER

## 2019-07-25 RX ORDER — BUSPIRONE HYDROCHLORIDE 30 MG/1
30 TABLET ORAL 2 TIMES DAILY
Qty: 60 TABLET | Refills: 0 | Status: SHIPPED | OUTPATIENT
Start: 2019-07-25 | End: 2019-08-22 | Stop reason: SDUPTHER

## 2019-07-25 RX ORDER — OXCARBAZEPINE 600 MG/1
600 TABLET, FILM COATED ORAL EVERY 12 HOURS SCHEDULED
Qty: 60 TABLET | Refills: 2 | Status: SHIPPED | OUTPATIENT
Start: 2019-07-25 | End: 2019-09-17 | Stop reason: SDUPTHER

## 2019-07-25 RX ORDER — FLUOXETINE HYDROCHLORIDE 40 MG/1
80 CAPSULE ORAL DAILY
Qty: 60 CAPSULE | Refills: 0 | Status: SHIPPED | OUTPATIENT
Start: 2019-07-25 | End: 2019-09-17 | Stop reason: SDUPTHER

## 2019-07-25 RX ORDER — PRAZOSIN HYDROCHLORIDE 2 MG/1
4 CAPSULE ORAL
Qty: 60 CAPSULE | Refills: 2 | Status: SHIPPED | OUTPATIENT
Start: 2019-07-25 | End: 2019-09-17 | Stop reason: SDUPTHER

## 2019-07-25 NOTE — PROGRESS NOTES
Subjective   Deni Lopez is a 49 y.o. male who is here today for medication management follow up.     Chief Complaint: Worsening depression and anxiety     HPI: History of Present Illness   Patient presents today reporting that he has been more depressed. The patient reports depressive symptoms including depressed mood, feelings of helplessness, low energy and difficulty concentrating, isolation, hard to focus and concentrate and have caused impairment in important areas of functioning.  Depression rated 7/10 with 10 being the worst.  Patient states that it mostly comes on in the evening as it has been more significant for the last month and a half and is not going away.  Patient states that at times he feels like he could cry but does not.  Patient reports that his anxiety has increased as well.  Patient states that when he is around his family and his kids that he feels good but the depression is still present.  Patient reports that when he is alone the depression significantly increases along with the anxiety.  Patient states that he has been sleeping good averaging 5-6 hours of sleep at night with no issues with sleep.  Patient reports that his appetite has been good as he is smoking marijuana daily but he is no longer drinking alcohol.  Patient states that he usually gets up around 6 in the morning and will smoke at the end and then take his medication around 9 AM in my smokes months some in the evening.  Advised patient that he can cause significant interactions or make the medication less effective if he is smoking with this medication and advised him to discontinue or stop smoking before he takes his morning medication.  Patient reports that he is still trying to take stay active and get outside and ride his 4 guardado but the depressive symptoms are still worsening in the evening for him.  Patient states that he just wants to close the blinds and not see anyone and that is how he is known his depression is  slowly getting worse as he was not like this.  Patient adamantly denies any SI or HI.  Patient denies any auditory or visual hallucinations.  Patient denies any current side effects to the medications.  Patient does report that he has drunk several cups of coffee this morning which could be the result of his elevated blood pressure at this visit.  Advised patient that he needs to go to the ER or to his primary care provider to have his blood pressure evaluated as it could cause serious health risk or risk of stroke.  Patient stated that he would check it at home and take medication if it did not go down he would go to the ER contact his PCP as he did not want to go to the ER at this time.        The following portions of the patient's history were reviewed and updated as appropriate: allergies, current medications, past family history, past medical history, past social history, past surgical history and problem list.    Review of Systems   Constitutional: Negative for fatigue.   HENT: Negative for congestion.    Psychiatric/Behavioral: Positive for dysphoric mood. Negative for agitation, behavioral problems, confusion, decreased concentration, hallucinations, self-injury, sleep disturbance and suicidal ideas. The patient is nervous/anxious. The patient is not hyperactive.    All other systems reviewed and are negative.    Patient denies any recent medical illnesses.  No acute cardiopulmonary symptoms evident.  No acute gastrointestinal complaints.  Patient's appetite and intake are adequate.  Patient's current weight compared with last weight.    Objective   Physical Exam   Constitutional: He appears well-developed and well-nourished.   Psychiatric: His speech is normal and behavior is normal. Judgment and thought content normal. His mood appears anxious. Cognition and memory are normal. He exhibits a depressed mood.   Nursing note and vitals reviewed.    Blood pressure (!) 170/106, pulse 71, height 175.3 cm  "(69.02\"), weight 79.2 kg (174 lb 9.6 oz). Body mass index is 25.77 kg/m².      Allergies   Allergen Reactions   • Codeine Sulfate Hives       Current Medications:   Current Outpatient Medications   Medication Sig Dispense Refill   • busPIRone (BUSPAR) 30 MG tablet Take 1 tablet by mouth 2 (Two) Times a Day. 60 tablet 0   • FLUoxetine (PROzac) 40 MG capsule Take 2 capsules by mouth Daily. 60 capsule 0   • ibuprofen (ADVIL,MOTRIN) 600 MG tablet   0   • lisinopril (PRINIVIL,ZESTRIL) 10 MG tablet Take 10 mg by mouth Daily.  0   • metoprolol tartrate (LOPRESSOR) 25 MG tablet 2 (Two) Times a Day.     • mirtazapine (REMERON) 45 MG tablet Take 1 tablet by mouth Every Night. 90 tablet 0   • OLANZapine (zyPREXA) 20 MG tablet Take 1 tablet by mouth Every Night. 30 tablet 2   • OXcarbazepine (TRILEPTAL) 600 MG tablet Take 1 tablet by mouth Every 12 (Twelve) Hours. 60 tablet 2   • prazosin (MINIPRESS) 2 MG capsule Take 2 capsules by mouth every night at bedtime. 60 capsule 2   • traZODone (DESYREL) 50 MG tablet Take 0.5-1 tablets by mouth Every Night. 90 tablet 0   • metoprolol succinate XL (TOPROL-XL) 50 MG 24 hr tablet Take 50 mg by mouth Daily.  0   • pantoprazole (PROTONIX) 40 MG EC tablet Daily.       No current facility-administered medications for this visit.        Mental Status Exam:   Hygiene:   fair  Cooperation:  Cooperative  Eye Contact:  Good  Psychomotor Behavior:  Appropriate  Affect:  Full range  Hopelessness: Denies  Speech:  Normal  Thought Process:  Goal directed and Linear  Thought Content:  Mood congurent  Suicidal:  None  Homicidal:  None  Hallucinations:  None  Delusion:  None  Memory:  Intact  Orientation:  Person, Place, Time and Situation  Reliability:  good  Insight:  Good  Judgement:  Good  Impulse Control:  Good  Physical/Medical Issues:  Yes hpt    Assessment/Plan   Diagnoses and all orders for this visit:    Severe episode of recurrent major depressive disorder, with psychotic features " (CMS/[facility])  -     mirtazapine (REMERON) 45 MG tablet; Take 1 tablet by mouth Every Night.  -     prazosin (MINIPRESS) 2 MG capsule; Take 2 capsules by mouth every night at bedtime.  -     traZODone (DESYREL) 50 MG tablet; Take 0.5-1 tablets by mouth Every Night.  -     OXcarbazepine (TRILEPTAL) 600 MG tablet; Take 1 tablet by mouth Every 12 (Twelve) Hours.  -     OLANZapine (zyPREXA) 20 MG tablet; Take 1 tablet by mouth Every Night.  -     busPIRone (BUSPAR) 30 MG tablet; Take 1 tablet by mouth 2 (Two) Times a Day.  -     Discontinue: FLUoxetine (PROzac) 20 MG capsule; Take 2 capsules for 2 weeks then take 1 capsule for 2 weeks then stop.  -     FLUoxetine (PROzac) 40 MG capsule; Take 2 capsules by mouth Daily.    Post traumatic stress disorder (PTSD)  -     mirtazapine (REMERON) 45 MG tablet; Take 1 tablet by mouth Every Night.  -     prazosin (MINIPRESS) 2 MG capsule; Take 2 capsules by mouth every night at bedtime.  -     OXcarbazepine (TRILEPTAL) 600 MG tablet; Take 1 tablet by mouth Every 12 (Twelve) Hours.  -     OLANZapine (zyPREXA) 20 MG tablet; Take 1 tablet by mouth Every Night.  -     busPIRone (BUSPAR) 30 MG tablet; Take 1 tablet by mouth 2 (Two) Times a Day.  -     Discontinue: FLUoxetine (PROzac) 20 MG capsule; Take 2 capsules for 2 weeks then take 1 capsule for 2 weeks then stop.  -     FLUoxetine (PROzac) 40 MG capsule; Take 2 capsules by mouth Daily.    Marijuana dependence (CMS/[facility])      I spent a total of  25 minutes in direct patient care, greater than 15 minutes (greater than 50%) were spent in coordination of care, and counseling the patient regarding increased depression and anxiety. Answered any questions patient had with medication and plan.     Continue Trileptal 600 mg twice daily.  Continue Minipress 2 mg at night.  Continue Remeron 45 mg at night for sleep.  Continue trazodone 25 mg at night as needed for sleep.  Continue Zyprexa 20 mg at night for depression.  Increase BuSpar to 30 mg  twice daily for worsening anxiety.  Increase Prozac to 80 mg daily for worsening depression.  Discussed the risks, benefits, and side effects of the medication; client acknowledged and verbally consented.  Patient is aware to contact the [facility] with any worsening of symptom.  Patient is agreeable to go to the ER or call 911 should they begin SI/HI.  Patient denies any side effects to the current medications. Patient reports that his PCP will be getting blood work this week, had patient sign consent to obtain lab work as PCP is also following patient's blood pressure and managing this as well.  Patient denies any current medical issues besides high blood pressure.  Patient reports that he is still smoking but he is trying to cut back.  Advised patient on the side effects of smoking marijuana as well as the interactions that it can cause with these medications patient verbalized understanding.  Patient felt that the Prozac had been helpful for him and wish for an increase instead of tapering off to see if that would help at first and stated that he would call if he did not feel if it was helping his depression.    Prognosis: Guarded dependent on medication/follow up and treatment plan compliance.  Functionality: pt having significant impairment in important areas of daily functioning.  Patient will follow-up in 4 weeks to evaluate depression and anxiety, encourage patient that if he had any worsening depression or anxiety symptoms contact the [facility] or any worsening depression with SI to go to the emergency room patient verbally agreed and consented.    Advised patient to go to the ER regarding his elevated blood pressure that was even rechecked and still remained elevated, patient does report that he is drinks several cups of coffee.  Patient stated that he would go home and take some blood pressure medications if it went down as he has a blood pressure machine to check it at home.  Highly encouraged  patient that he needs to go on to the ER but he refuses at this time encouraged him that if his blood pressure remained elevated to contact his PCP or go to the emergency room as it could cause high risk of stroke or heart disease.    The patient was instructed to call clinic as needed or go to ER if in crisis.  Patient was instructed to immediately call 911 or come to the nearest emergency room for thoughts to harm self or others.    Errors in dictation may reflect use of voice recognition software and not all errors in transcription may have been detected prior to signing.

## 2019-08-21 DIAGNOSIS — F43.10 POST TRAUMATIC STRESS DISORDER (PTSD): ICD-10-CM

## 2019-08-21 DIAGNOSIS — F33.3 SEVERE EPISODE OF RECURRENT MAJOR DEPRESSIVE DISORDER, WITH PSYCHOTIC FEATURES (HCC): ICD-10-CM

## 2019-08-22 RX ORDER — BUSPIRONE HYDROCHLORIDE 30 MG/1
TABLET ORAL
Qty: 60 TABLET | Refills: 0 | Status: SHIPPED | OUTPATIENT
Start: 2019-08-22 | End: 2019-09-17 | Stop reason: SDUPTHER

## 2019-09-17 ENCOUNTER — OFFICE VISIT (OUTPATIENT)
Dept: PSYCHIATRY | Facility: CLINIC | Age: 49
End: 2019-09-17

## 2019-09-17 VITALS
HEART RATE: 58 BPM | HEIGHT: 69 IN | WEIGHT: 172.2 LBS | DIASTOLIC BLOOD PRESSURE: 92 MMHG | BODY MASS INDEX: 25.51 KG/M2 | SYSTOLIC BLOOD PRESSURE: 148 MMHG

## 2019-09-17 DIAGNOSIS — F33.3 SEVERE EPISODE OF RECURRENT MAJOR DEPRESSIVE DISORDER, WITH PSYCHOTIC FEATURES (HCC): Primary | ICD-10-CM

## 2019-09-17 DIAGNOSIS — F12.20 MARIJUANA DEPENDENCE (HCC): ICD-10-CM

## 2019-09-17 DIAGNOSIS — F43.10 POST TRAUMATIC STRESS DISORDER (PTSD): ICD-10-CM

## 2019-09-17 PROCEDURE — 99213 OFFICE O/P EST LOW 20 MIN: CPT | Performed by: NURSE PRACTITIONER

## 2019-09-17 RX ORDER — OLANZAPINE 20 MG/1
20 TABLET ORAL NIGHTLY
Qty: 90 TABLET | Refills: 0 | Status: SHIPPED | OUTPATIENT
Start: 2019-09-17 | End: 2019-12-09 | Stop reason: SDUPTHER

## 2019-09-17 RX ORDER — FLUOXETINE HYDROCHLORIDE 40 MG/1
80 CAPSULE ORAL DAILY
Qty: 120 CAPSULE | Refills: 0 | Status: SHIPPED | OUTPATIENT
Start: 2019-09-17 | End: 2019-11-13 | Stop reason: SDUPTHER

## 2019-09-17 RX ORDER — TRAZODONE HYDROCHLORIDE 50 MG/1
25-50 TABLET ORAL NIGHTLY
Qty: 90 TABLET | Refills: 0 | Status: SHIPPED | OUTPATIENT
Start: 2019-09-17 | End: 2019-12-09 | Stop reason: SDUPTHER

## 2019-09-17 RX ORDER — OXCARBAZEPINE 600 MG/1
600 TABLET, FILM COATED ORAL EVERY 12 HOURS SCHEDULED
Qty: 120 TABLET | Refills: 0 | Status: SHIPPED | OUTPATIENT
Start: 2019-09-17 | End: 2019-12-09 | Stop reason: SDUPTHER

## 2019-09-17 RX ORDER — MIRTAZAPINE 45 MG/1
45 TABLET, FILM COATED ORAL NIGHTLY
Qty: 90 TABLET | Refills: 0 | Status: SHIPPED | OUTPATIENT
Start: 2019-09-17 | End: 2019-12-09 | Stop reason: SDUPTHER

## 2019-09-17 RX ORDER — LISINOPRIL 20 MG/1
20 TABLET ORAL DAILY
Refills: 0 | COMMUNITY
Start: 2019-08-21 | End: 2020-11-24 | Stop reason: SDUPTHER

## 2019-09-17 RX ORDER — PRAZOSIN HYDROCHLORIDE 2 MG/1
4 CAPSULE ORAL
Qty: 120 CAPSULE | Refills: 0 | Status: SHIPPED | OUTPATIENT
Start: 2019-09-17 | End: 2019-12-09 | Stop reason: SDUPTHER

## 2019-09-17 RX ORDER — BUSPIRONE HYDROCHLORIDE 30 MG/1
30 TABLET ORAL 2 TIMES DAILY
Qty: 120 TABLET | Refills: 0 | Status: SHIPPED | OUTPATIENT
Start: 2019-09-17 | End: 2019-11-13 | Stop reason: SDUPTHER

## 2019-09-17 NOTE — PROGRESS NOTES
Subjective   Deni Lopez is a 49 y.o. male who is here today for medication management follow up.     Chief Complaint: Follow-up for depression and anxiety     HPI: History of Present Illness   Patient  presents today reporting that he has been doing much better since the increase in Prozac.  Patient states that he has been able to do with his depression and anxiety better.  Patient reports that his sleep has been good as he is averaging roughly 6 hours of sleep at night with no issues.  Patient reports his appetite has been good as he is still smoking marijuana daily to help with his appetite as well as his mood.  Discussed his marijuana use in detail and advised him to cut back as it is a CNS suppressant and can make his depression and anxiety worse patient stated he would try.  Patient is rating his depression and anxiety a 4 out of 10 on a scale of 0-10 with 10 being the worst.  Patient reports that he is coping better and able to handle his depression and anxiety.  Patient states that he is trying to be active as he has been getting outside and riding on his 4 guardado as well as taking care of his 2 twin children that are 15 and one boy and one girl.  Patient denies isolation as he states he is been trying to be more active.  Patient reports that he did go to his PCP and they placed him on lisinopril and metoprolol which has helped his blood pressure.  Patient denies any side effects to the current medications.  Patient adamantly denies any SI or HI.  Patient denies any auditory visual hallucinations.        The following portions of the patient's history were reviewed and updated as appropriate: allergies, current medications, past family history, past medical history, past social history, past surgical history and problem list.    Review of Systems   Constitutional: Negative for fatigue.   HENT: Negative for congestion.    Psychiatric/Behavioral: Positive for dysphoric mood. Negative for agitation,  "behavioral problems, confusion, decreased concentration, hallucinations, self-injury, sleep disturbance and suicidal ideas. The patient is nervous/anxious. The patient is not hyperactive.    All other systems reviewed and are negative.    Patient denies any recent medical illnesses.  No acute cardiopulmonary symptoms evident.  No acute gastrointestinal complaints.  Patient's appetite and intake are adequate.  Patient's current weight compared with last weight.    Objective   Physical Exam   Constitutional: He appears well-developed and well-nourished.   Psychiatric: His speech is normal and behavior is normal. Judgment and thought content normal. His mood appears anxious. Cognition and memory are normal. He exhibits a depressed mood.   Nursing note and vitals reviewed.    Blood pressure 148/92, pulse 58, height 175.3 cm (69.02\"), weight 78.1 kg (172 lb 3.2 oz). Body mass index is 25.42 kg/m².      Allergies   Allergen Reactions   • Codeine Sulfate Hives       Current Medications:   Current Outpatient Medications   Medication Sig Dispense Refill   • busPIRone (BUSPAR) 30 MG tablet Take 1 tablet by mouth 2 (Two) Times a Day. 120 tablet 0   • FLUoxetine (PROzac) 40 MG capsule Take 2 capsules by mouth Daily. 120 capsule 0   • ibuprofen (ADVIL,MOTRIN) 600 MG tablet   0   • lisinopril (PRINIVIL,ZESTRIL) 10 MG tablet Take 10 mg by mouth Daily.  0   • metoprolol succinate XL (TOPROL-XL) 50 MG 24 hr tablet Take 50 mg by mouth Daily.  0   • metoprolol tartrate (LOPRESSOR) 25 MG tablet 2 (Two) Times a Day.     • mirtazapine (REMERON) 45 MG tablet Take 1 tablet by mouth Every Night. 90 tablet 0   • OLANZapine (zyPREXA) 20 MG tablet Take 1 tablet by mouth Every Night. 90 tablet 0   • OXcarbazepine (TRILEPTAL) 600 MG tablet Take 1 tablet by mouth Every 12 (Twelve) Hours. 120 tablet 0   • pantoprazole (PROTONIX) 40 MG EC tablet Daily.     • prazosin (MINIPRESS) 2 MG capsule Take 2 capsules by mouth every night at bedtime. 120 " capsule 0   • traZODone (DESYREL) 50 MG tablet Take 0.5-1 tablets by mouth Every Night. 90 tablet 0   • lisinopril (PRINIVIL,ZESTRIL) 20 MG tablet Take 20 mg by mouth Daily.  0     No current facility-administered medications for this visit.        Mental Status Exam:   Hygiene:   fair  Cooperation:  Cooperative  Eye Contact:  Good  Psychomotor Behavior:  Appropriate  Affect:  Full range  Hopelessness: Denies  Speech:  Normal  Thought Process:  Goal directed and Linear  Thought Content:  Mood congurent  Suicidal:  None  Homicidal:  None  Hallucinations:  None  Delusion:  None  Memory:  Intact  Orientation:  Person, Place, Time and Situation  Reliability:  good  Insight:  Good  Judgement:  Good  Impulse Control:  Good  Physical/Medical Issues:  Yes hpt    Assessment/Plan   Diagnoses and all orders for this visit:    Severe episode of recurrent major depressive disorder, with psychotic features (CMS/HCC)  -     busPIRone (BUSPAR) 30 MG tablet; Take 1 tablet by mouth 2 (Two) Times a Day.  -     FLUoxetine (PROzac) 40 MG capsule; Take 2 capsules by mouth Daily.  -     mirtazapine (REMERON) 45 MG tablet; Take 1 tablet by mouth Every Night.  -     OLANZapine (zyPREXA) 20 MG tablet; Take 1 tablet by mouth Every Night.  -     OXcarbazepine (TRILEPTAL) 600 MG tablet; Take 1 tablet by mouth Every 12 (Twelve) Hours.  -     prazosin (MINIPRESS) 2 MG capsule; Take 2 capsules by mouth every night at bedtime.  -     traZODone (DESYREL) 50 MG tablet; Take 0.5-1 tablets by mouth Every Night.    Post traumatic stress disorder (PTSD)  -     busPIRone (BUSPAR) 30 MG tablet; Take 1 tablet by mouth 2 (Two) Times a Day.  -     FLUoxetine (PROzac) 40 MG capsule; Take 2 capsules by mouth Daily.  -     mirtazapine (REMERON) 45 MG tablet; Take 1 tablet by mouth Every Night.  -     OLANZapine (zyPREXA) 20 MG tablet; Take 1 tablet by mouth Every Night.  -     OXcarbazepine (TRILEPTAL) 600 MG tablet; Take 1 tablet by mouth Every 12 (Twelve)  Hours.  -     prazosin (MINIPRESS) 2 MG capsule; Take 2 capsules by mouth every night at bedtime.    Marijuana dependence (CMS/HCC)        Discussed medication options and any side effects.    Continue Trileptal 600 mg twice daily.  Continue Minipress 2 mg at night.  Continue Remeron 45 mg at night for sleep.  Continue trazodone 25 mg at night as needed for sleep, as patient states he only takes a quarter dose at times.  Continue Zyprexa 20 mg at night for depression.  Continue BuSpar to 30 mg twice daily for worsening anxiety.  Continue Prozac 80 mg daily for worsening depression.  Discussed the risks, benefits, and side effects of the medication; client acknowledged and verbally consented.  Patient is aware to contact the [facility] with any worsening of symptom.  Patient is agreeable to go to the ER or call 911 should they begin SI/HI.  Patient denies any side effects to the current medications.     Prognosis: Guarded dependent on medication/follow up and treatment plan compliance.  Functionality: pt showing improvements in important areas of daily functioning.  Patient will follow-up in 8 weeks to evaluate depression and anxiety, encourage patient that if he had any worsening depression or anxiety symptoms contact the [facility] or any worsening depression with SI to go to the emergency room patient verbally agreed and consented.      The patient was instructed to call clinic as needed or go to ER if in crisis.  Patient was instructed to immediately call 911 or come to the nearest emergency room for thoughts to harm self or others.    Errors in dictation may reflect use of voice recognition software and not all errors in transcription may have been detected prior to signing.

## 2019-09-17 NOTE — TREATMENT PLAN
Multi-Disciplinary Problems (from Behavioral Health Treatment Plan)    Active Problems     Problem: Depression  Start Date: 09/17/19    Problem Details:  The patient self-scales this problem as a 4 with 10 being the worst.       Goal Priority Start Date Expected End Date End Date    Patient will demonstrate the ability to initiate new constructive life skills outside of sessions on a consistent basis. -- 09/17/19 -- --    Goal Details:  Progress toward goal:  The patient self-scales their progress related to this goal as a 4 with 10 being the worst.       Goal Intervention Frequency Start Date End Date    Assist patient in setting attainable activities of daily living goals. PRN 09/17/19 --    Goal Intervention Frequency Start Date End Date    Provide education about depression Weekly 09/17/19 --    Intervention Details:  Duration of treatment until until remission of symptoms.       Goal Intervention Frequency Start Date End Date    Assist patient in developing healthy coping strategies. Weekly 09/17/19 --    Intervention Details:  Duration of treatment until until remission of symptoms.                          I have discussed and reviewed this treatment plan with the patient.

## 2019-10-29 DIAGNOSIS — F43.10 POST TRAUMATIC STRESS DISORDER (PTSD): ICD-10-CM

## 2019-10-29 DIAGNOSIS — F33.3 SEVERE EPISODE OF RECURRENT MAJOR DEPRESSIVE DISORDER, WITH PSYCHOTIC FEATURES (HCC): ICD-10-CM

## 2019-10-29 RX ORDER — BUSPIRONE HYDROCHLORIDE 30 MG/1
TABLET ORAL
Qty: 120 TABLET | Refills: 0 | OUTPATIENT
Start: 2019-10-29

## 2019-11-13 DIAGNOSIS — F33.3 SEVERE EPISODE OF RECURRENT MAJOR DEPRESSIVE DISORDER, WITH PSYCHOTIC FEATURES (HCC): ICD-10-CM

## 2019-11-13 DIAGNOSIS — F43.10 POST TRAUMATIC STRESS DISORDER (PTSD): ICD-10-CM

## 2019-11-13 RX ORDER — BUSPIRONE HYDROCHLORIDE 30 MG/1
30 TABLET ORAL 2 TIMES DAILY
Qty: 120 TABLET | Refills: 0 | Status: SHIPPED | OUTPATIENT
Start: 2019-11-13 | End: 2019-12-09 | Stop reason: SDUPTHER

## 2019-11-13 RX ORDER — FLUOXETINE HYDROCHLORIDE 40 MG/1
80 CAPSULE ORAL DAILY
Qty: 120 CAPSULE | Refills: 0 | Status: SHIPPED | OUTPATIENT
Start: 2019-11-13 | End: 2019-12-09

## 2019-12-09 ENCOUNTER — OFFICE VISIT (OUTPATIENT)
Dept: PSYCHIATRY | Facility: CLINIC | Age: 49
End: 2019-12-09

## 2019-12-09 VITALS
BODY MASS INDEX: 25.77 KG/M2 | HEIGHT: 69 IN | HEART RATE: 77 BPM | WEIGHT: 174 LBS | DIASTOLIC BLOOD PRESSURE: 104 MMHG | SYSTOLIC BLOOD PRESSURE: 169 MMHG

## 2019-12-09 DIAGNOSIS — F43.10 POST TRAUMATIC STRESS DISORDER (PTSD): ICD-10-CM

## 2019-12-09 DIAGNOSIS — F33.2 SEVERE EPISODE OF RECURRENT MAJOR DEPRESSIVE DISORDER, WITHOUT PSYCHOTIC FEATURES (HCC): Primary | ICD-10-CM

## 2019-12-09 DIAGNOSIS — F41.1 GENERALIZED ANXIETY DISORDER: ICD-10-CM

## 2019-12-09 DIAGNOSIS — F12.20 MARIJUANA DEPENDENCE (HCC): ICD-10-CM

## 2019-12-09 PROCEDURE — 99214 OFFICE O/P EST MOD 30 MIN: CPT | Performed by: NURSE PRACTITIONER

## 2019-12-09 RX ORDER — HYDROXYZINE HYDROCHLORIDE 25 MG/1
25 TABLET, FILM COATED ORAL 3 TIMES DAILY PRN
Qty: 90 TABLET | Refills: 0 | Status: SHIPPED | OUTPATIENT
Start: 2019-12-09 | End: 2020-02-05 | Stop reason: SDUPTHER

## 2019-12-09 RX ORDER — BUSPIRONE HYDROCHLORIDE 30 MG/1
30 TABLET ORAL 2 TIMES DAILY
Qty: 60 TABLET | Refills: 0 | Status: SHIPPED | OUTPATIENT
Start: 2019-12-09 | End: 2020-01-07 | Stop reason: SDUPTHER

## 2019-12-09 RX ORDER — MIRTAZAPINE 45 MG/1
45 TABLET, FILM COATED ORAL NIGHTLY
Qty: 30 TABLET | Refills: 0 | Status: SHIPPED | OUTPATIENT
Start: 2019-12-09 | End: 2020-01-07 | Stop reason: SDUPTHER

## 2019-12-09 RX ORDER — OXCARBAZEPINE 600 MG/1
600 TABLET, FILM COATED ORAL EVERY 12 HOURS SCHEDULED
Qty: 60 TABLET | Refills: 0 | Status: SHIPPED | OUTPATIENT
Start: 2019-12-09 | End: 2020-01-07 | Stop reason: SDUPTHER

## 2019-12-09 RX ORDER — OLANZAPINE 20 MG/1
20 TABLET ORAL NIGHTLY
Qty: 30 TABLET | Refills: 0 | Status: SHIPPED | OUTPATIENT
Start: 2019-12-09 | End: 2020-01-07 | Stop reason: SDUPTHER

## 2019-12-09 RX ORDER — SERTRALINE HYDROCHLORIDE 25 MG/1
25 TABLET, FILM COATED ORAL DAILY
Qty: 30 TABLET | Refills: 0 | Status: SHIPPED | OUTPATIENT
Start: 2019-12-09 | End: 2020-01-07 | Stop reason: SDUPTHER

## 2019-12-09 RX ORDER — FLUOXETINE HYDROCHLORIDE 20 MG/1
20 CAPSULE ORAL DAILY
Qty: 14 CAPSULE | Refills: 0 | Status: SHIPPED | OUTPATIENT
Start: 2019-12-09 | End: 2020-01-07

## 2019-12-09 RX ORDER — TRAZODONE HYDROCHLORIDE 50 MG/1
25-50 TABLET ORAL NIGHTLY
Qty: 30 TABLET | Refills: 0 | Status: SHIPPED | OUTPATIENT
Start: 2019-12-09 | End: 2020-01-07 | Stop reason: SDUPTHER

## 2019-12-09 RX ORDER — PRAZOSIN HYDROCHLORIDE 5 MG/1
5 CAPSULE ORAL
Qty: 30 CAPSULE | Refills: 0 | Status: SHIPPED | OUTPATIENT
Start: 2019-12-09 | End: 2020-01-07 | Stop reason: SDUPTHER

## 2019-12-09 RX ORDER — FLUOXETINE HYDROCHLORIDE 40 MG/1
40 CAPSULE ORAL DAILY
Qty: 14 CAPSULE | Refills: 0 | Status: SHIPPED | OUTPATIENT
Start: 2019-12-09 | End: 2020-01-07

## 2019-12-09 NOTE — PROGRESS NOTES
Subjective   Deni Lopez is a 49 y.o. male who is here today for medication management follow up.     Chief Complaint: Worsening depression and anxiety     HPI: History of Present Illness   Patient comes in today stating that he has not been doing good.  He reports that the Prozac is no longer helpful for him and he is having a lot of anxiety and depression and rating his anxiety and depression a 6-7 on a scale of 0-10 with 10 being the worst. The patient reports depressive symptoms including depressed mood, crying spells, feelings of guilt, feelings of worthlessness, low energy and difficulty concentrating, and have caused impairment in important areas of functioning.  Depression rated 7/10 with 10 being the worst. The patient reports the following symptoms of anxiety: constant anxiety/worry, restlessness/on edge, difficulty concentrating, irritability and anxiety causes distress/impairment in important areas of functioning and have caused impairment in important areas of functioning.  Patient states that he has not had a lot of stressors as the symptoms have just slowly been worsening over the past month.  Patient states that he is still only getting roughly 4 hours of sleep as he goes to sleep well but it is staying asleep that is the issue as he wakes up was racing thoughts and dreams as he has been having nightmares more frequently than previously.  He also notes that his blood pressure has been increased lately and he goes to see his PCP for workup and a checkup next week.  Patient denies any side effects to the medication but feels that has not been helpful for him.  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  Patient states that his appetite has been good as he has noticed and he wants to stay in the house more and isolate as well as increase his food intake.  Patient states that he is still  "smoking marijuana but avoiding with the medications.  He also notes that he is tried CBD Gummies which seemed to help with his anxiety is still feeling overwhelming as he does not feel the BuSpar is helpful anymore.  Patient has failed and tried Wellbutrin, paroxetine and duloxetine and states those were ineffective.  Patient denies any current side effects to the medication.  Patient denies any SI or HI.  Patient denies any auditory or visual hallucinations.        The following portions of the patient's history were reviewed and updated as appropriate: allergies, current medications, past family history, past medical history, past social history, past surgical history and problem list.    Review of Systems   Constitutional: Negative for fatigue.   HENT: Negative for congestion.    Psychiatric/Behavioral: Positive for dysphoric mood. Negative for agitation, behavioral problems, confusion, decreased concentration, hallucinations, self-injury, sleep disturbance and suicidal ideas. The patient is nervous/anxious. The patient is not hyperactive.      Patient denies any recent medical illnesses.  No acute cardiopulmonary symptoms evident.  No acute gastrointestinal complaints.  Patient's appetite and intake are adequate.  Patient's current weight compared with last weight.    Objective   Physical Exam   Constitutional: He appears well-developed and well-nourished.   Psychiatric: His speech is normal and behavior is normal. Judgment and thought content normal. His mood appears anxious. Cognition and memory are normal. He exhibits a depressed mood.   Nursing note and vitals reviewed.    Blood pressure (!) 169/104, pulse 77, height 175.3 cm (69.02\"), weight 78.9 kg (174 lb). Body mass index is 25.68 kg/m².      Allergies   Allergen Reactions   • Codeine Sulfate Hives       Current Medications:   Current Outpatient Medications   Medication Sig Dispense Refill   • busPIRone (BUSPAR) 30 MG tablet Take 1 tablet by mouth 2 " (Two) Times a Day. 60 tablet 0   • ibuprofen (ADVIL,MOTRIN) 600 MG tablet   0   • lisinopril (PRINIVIL,ZESTRIL) 10 MG tablet Take 10 mg by mouth Daily.  0   • lisinopril (PRINIVIL,ZESTRIL) 20 MG tablet Take 20 mg by mouth Daily.  0   • metoprolol succinate XL (TOPROL-XL) 50 MG 24 hr tablet Take 50 mg by mouth Daily.  0   • metoprolol tartrate (LOPRESSOR) 25 MG tablet 2 (Two) Times a Day.     • mirtazapine (REMERON) 45 MG tablet Take 1 tablet by mouth Every Night. 30 tablet 0   • OLANZapine (zyPREXA) 20 MG tablet Take 1 tablet by mouth Every Night. 30 tablet 0   • OXcarbazepine (TRILEPTAL) 600 MG tablet Take 1 tablet by mouth Every 12 (Twelve) Hours. 60 tablet 0   • pantoprazole (PROTONIX) 40 MG EC tablet Daily.     • prazosin (MINIPRESS) 5 MG capsule Take 1 capsule by mouth every night at bedtime. 30 capsule 0   • traZODone (DESYREL) 50 MG tablet Take 0.5-1 tablets by mouth Every Night. 30 tablet 0   • FLUoxetine (PROzac) 20 MG capsule Take 1 capsule by mouth Daily. 14 capsule 0   • FLUoxetine (PROzac) 40 MG capsule Take 1 capsule by mouth Daily. 14 capsule 0   • hydrOXYzine (ATARAX) 25 MG tablet Take 1 tablet by mouth 3 (Three) Times a Day As Needed for Anxiety. 90 tablet 0   • sertraline (ZOLOFT) 25 MG tablet Take 1 tablet by mouth Daily. 30 tablet 0     No current facility-administered medications for this visit.        Mental Status Exam:   Hygiene:   Good  Cooperation:  Cooperative  Eye Contact:  Good  Psychomotor Behavior:  Appropriate  Affect:  Full range  Hopelessness: Denies  Speech:  Normal  Thought Process:  Goal directed and Linear  Thought Content:  Mood congurent  Suicidal:  None  Homicidal:  None  Hallucinations:  None  Delusion:  None  Memory:  Intact  Orientation:  Person, Place, Time and Situation  Reliability:  good  Insight:  Good  Judgement:  Good  Impulse Control:  Good  Physical/Medical Issues:  Yes hpt    Assessment/Plan   Diagnoses and all orders for this visit:    Severe episode of  recurrent major depressive disorder, without psychotic features (CMS/HCC)  -     busPIRone (BUSPAR) 30 MG tablet; Take 1 tablet by mouth 2 (Two) Times a Day.  -     mirtazapine (REMERON) 45 MG tablet; Take 1 tablet by mouth Every Night.  -     OLANZapine (zyPREXA) 20 MG tablet; Take 1 tablet by mouth Every Night.  -     OXcarbazepine (TRILEPTAL) 600 MG tablet; Take 1 tablet by mouth Every 12 (Twelve) Hours.  -     prazosin (MINIPRESS) 5 MG capsule; Take 1 capsule by mouth every night at bedtime.  -     traZODone (DESYREL) 50 MG tablet; Take 0.5-1 tablets by mouth Every Night.  -     sertraline (ZOLOFT) 25 MG tablet; Take 1 tablet by mouth Daily.    Generalized anxiety disorder  -     hydrOXYzine (ATARAX) 25 MG tablet; Take 1 tablet by mouth 3 (Three) Times a Day As Needed for Anxiety.  -     sertraline (ZOLOFT) 25 MG tablet; Take 1 tablet by mouth Daily.    Post traumatic stress disorder (PTSD)  -     busPIRone (BUSPAR) 30 MG tablet; Take 1 tablet by mouth 2 (Two) Times a Day.  -     mirtazapine (REMERON) 45 MG tablet; Take 1 tablet by mouth Every Night.  -     OLANZapine (zyPREXA) 20 MG tablet; Take 1 tablet by mouth Every Night.  -     OXcarbazepine (TRILEPTAL) 600 MG tablet; Take 1 tablet by mouth Every 12 (Twelve) Hours.  -     prazosin (MINIPRESS) 5 MG capsule; Take 1 capsule by mouth every night at bedtime.    Marijuana dependence (CMS/HCC)    Other orders  -     FLUoxetine (PROzac) 20 MG capsule; Take 1 capsule by mouth Daily.  -     FLUoxetine (PROzac) 40 MG capsule; Take 1 capsule by mouth Daily.        Discussed medication options and any side effects as well as his worsening symptoms of depression and anxiety.     Continue Trileptal 600 mg twice daily.  Continue Remeron 45 mg at night for sleep.  Continue trazodone 25 mg at night as needed for sleep, as patient states he only takes a quarter dose at times.  Continue Zyprexa 20 mg at night for depression.  Continue BuSpar to 30 mg twice daily for worsening  anxiety.  Increase Minipress to 5 mg at night since he is having worsening nightmares.  Begin hydroxyzine 12.5-25 mg up to 3 times a day as needed for worsening anxiety as patient does not feel the BuSpar is helpful.  We will reevaluate at his next appointment and look at tapering off the BuSpar.  Since patient is having worsening depression and anxiety even with the increase in fluoxetine will discontinue and taper off and begin sertraline 25 mg daily for depression and anxiety.  Instructed the patient to take 60 mg for 1 week then 40 the following then 20 mg the next week and then discontinue after 3 weeks patient agreed.  Highly encouraged patient that if he had any worsening symptoms of depression or any SI to immediately stop the sertraline and go to the ER and contact the [facility] patient agreed.  Discussed the risks, benefits, and side effects of the medication; client acknowledged and verbally consented.  Patient is aware to contact the [facility] with any worsening of symptom.  Patient is agreeable to go to the ER or call 911 should they begin SI/HI.  Patient denies any side effects to the current medications.  Discussed the use of antipsychotics as well as mood stabilizers as well as risk and benefits and side effects patient agreed.  Will request lab records from patient's PCP including CBC CMP lipid panel and thyroid level if not relevant we will order for next visit as patient states he has been following up with his PCP as well as lab work.    Prognosis: Guarded dependent on medication/follow up and treatment plan compliance.  Functionality: pt having significant impairment in important areas of daily functioning regarding depression and anxiety will adjust medications and reevaluate over the next 4 weeks.  Patient will follow-up in 4 weeks, highly encouraged patient that if he had any worsening symptoms contact the [facility] for sooner appointment patient agreed.      The patient was  instructed to call clinic as needed or go to ER if in crisis.  Patient was instructed to immediately call 911 or come to the nearest emergency room for thoughts to harm self or others.    Errors in dictation may reflect use of voice recognition software and not all errors in transcription may have been detected prior to signing.

## 2020-01-07 ENCOUNTER — OFFICE VISIT (OUTPATIENT)
Dept: PSYCHIATRY | Facility: CLINIC | Age: 50
End: 2020-01-07

## 2020-01-07 VITALS
HEIGHT: 69 IN | WEIGHT: 172.4 LBS | BODY MASS INDEX: 25.53 KG/M2 | SYSTOLIC BLOOD PRESSURE: 141 MMHG | HEART RATE: 62 BPM | DIASTOLIC BLOOD PRESSURE: 93 MMHG

## 2020-01-07 DIAGNOSIS — F41.1 GENERALIZED ANXIETY DISORDER: ICD-10-CM

## 2020-01-07 DIAGNOSIS — F43.10 POST TRAUMATIC STRESS DISORDER (PTSD): ICD-10-CM

## 2020-01-07 DIAGNOSIS — F33.2 SEVERE EPISODE OF RECURRENT MAJOR DEPRESSIVE DISORDER, WITHOUT PSYCHOTIC FEATURES (HCC): ICD-10-CM

## 2020-01-07 DIAGNOSIS — F12.20 MARIJUANA DEPENDENCE (HCC): ICD-10-CM

## 2020-01-07 PROCEDURE — 99213 OFFICE O/P EST LOW 20 MIN: CPT | Performed by: NURSE PRACTITIONER

## 2020-01-07 RX ORDER — TRAZODONE HYDROCHLORIDE 50 MG/1
25-50 TABLET ORAL NIGHTLY
Qty: 30 TABLET | Refills: 0 | Status: SHIPPED | OUTPATIENT
Start: 2020-01-07 | End: 2020-02-05 | Stop reason: SDUPTHER

## 2020-01-07 RX ORDER — OLANZAPINE 20 MG/1
20 TABLET ORAL NIGHTLY
Qty: 30 TABLET | Refills: 0 | Status: SHIPPED | OUTPATIENT
Start: 2020-01-07 | End: 2020-02-05 | Stop reason: SDUPTHER

## 2020-01-07 RX ORDER — MIRTAZAPINE 45 MG/1
45 TABLET, FILM COATED ORAL NIGHTLY
Qty: 30 TABLET | Refills: 0 | Status: SHIPPED | OUTPATIENT
Start: 2020-01-07 | End: 2020-02-05 | Stop reason: SDUPTHER

## 2020-01-07 RX ORDER — OXCARBAZEPINE 600 MG/1
600 TABLET, FILM COATED ORAL EVERY 12 HOURS SCHEDULED
Qty: 60 TABLET | Refills: 0 | Status: SHIPPED | OUTPATIENT
Start: 2020-01-07 | End: 2020-02-05 | Stop reason: SDUPTHER

## 2020-01-07 RX ORDER — BUSPIRONE HYDROCHLORIDE 30 MG/1
30 TABLET ORAL 2 TIMES DAILY
Qty: 60 TABLET | Refills: 0 | Status: SHIPPED | OUTPATIENT
Start: 2020-01-07 | End: 2020-02-05 | Stop reason: SDUPTHER

## 2020-01-07 RX ORDER — PRAZOSIN HYDROCHLORIDE 5 MG/1
5 CAPSULE ORAL
Qty: 30 CAPSULE | Refills: 0 | Status: SHIPPED | OUTPATIENT
Start: 2020-01-07 | End: 2020-02-05 | Stop reason: SDUPTHER

## 2020-01-07 NOTE — PROGRESS NOTES
Subjective   Deni Lopez is a 49 y.o. male who is here today for medication management follow up.     Chief Complaint: follow-up depression and anxiety     HPI: History of Present Illness   Comes in today stating that he has been doing much better with his depression and anxiety.  He states that he is off Prozac and tolerated well he reports that he did have some issues with his stomach but that has subsided.  Patient states that his depression and anxiety are roughly a 4-5 on a scale of 0-10 with 10 being the worst.  Patient states that he still gets depressed at times but overall his mood has been much better as he does not feel as down.  Patient denies any aggression as he feels everything has enlightened his mood overall.  She notes that he did get a mood lot which he believes has helped him feel better as well.  Patient also states he is taking CBD oil as well as smoking 2 joints a day but has cut back.  Patient also notes that he is down to less than a pack of cigarettes daily as he has been trying to quit but still not ready yet.  Patient also notes that his sleep has been good as he has not been having as many nightmares and they are not as vivid.  He reports that his appetite has been good as well.  Patient denies any side effects to the current medications.  Patient reports overall he seems to be doing much better.  He did note that he did not want to come off the BuSpar as he states it is still helpful for him.  He states that he saw his PCP last month and got lab work will request as well as change in blood pressure medication and states he is taking religiously.  Patient denied any SI or HI.  Patient denied any auditory or visual hallucinations.  Patient states that he did follow-up with his PCP and reports that his lab work was within normal limits as far as he knew but states that they did want to check his kidneys and do an ultrasound which she gets this Thursday due to his chronic high blood  "pressure.    The following portions of the patient's history were reviewed and updated as appropriate: allergies, current medications, past family history, past medical history, past social history, past surgical history and problem list.    Review of Systems   Constitutional: Negative for fatigue.   HENT: Negative for congestion.    Psychiatric/Behavioral: Positive for dysphoric mood. Negative for agitation, behavioral problems, confusion, decreased concentration, hallucinations, self-injury, sleep disturbance and suicidal ideas. The patient is nervous/anxious. The patient is not hyperactive.      Patient denies any recent medical illnesses.  No acute cardiopulmonary symptoms evident.  No acute gastrointestinal complaints.  Patient's appetite and intake are adequate.  Patient's current weight compared with last weight.    Objective   Physical Exam   Constitutional: He appears well-developed and well-nourished.   Psychiatric: His speech is normal and behavior is normal. Judgment and thought content normal. His mood appears anxious. Cognition and memory are normal. He exhibits a depressed mood.   Nursing note and vitals reviewed.    Blood pressure 141/93, pulse 62, height 175.3 cm (69.02\"), weight 78.2 kg (172 lb 6.4 oz). Body mass index is 25.44 kg/m².      Allergies   Allergen Reactions   • Codeine Sulfate Hives       Current Medications:   Current Outpatient Medications   Medication Sig Dispense Refill   • busPIRone (BUSPAR) 30 MG tablet Take 1 tablet by mouth 2 (Two) Times a Day. 60 tablet 0   • hydrOXYzine (ATARAX) 25 MG tablet Take 1 tablet by mouth 3 (Three) Times a Day As Needed for Anxiety. 90 tablet 0   • ibuprofen (ADVIL,MOTRIN) 600 MG tablet   0   • lisinopril (PRINIVIL,ZESTRIL) 10 MG tablet Take 10 mg by mouth Daily.  0   • lisinopril (PRINIVIL,ZESTRIL) 20 MG tablet Take 20 mg by mouth Daily.  0   • metoprolol succinate XL (TOPROL-XL) 50 MG 24 hr tablet Take 50 mg by mouth Daily.  0   • mirtazapine " (REMERON) 45 MG tablet Take 1 tablet by mouth Every Night. 30 tablet 0   • OLANZapine (zyPREXA) 20 MG tablet Take 1 tablet by mouth Every Night. 30 tablet 0   • OXcarbazepine (TRILEPTAL) 600 MG tablet Take 1 tablet by mouth Every 12 (Twelve) Hours. 60 tablet 0   • pantoprazole (PROTONIX) 40 MG EC tablet Daily.     • prazosin (MINIPRESS) 5 MG capsule Take 1 capsule by mouth every night at bedtime. 30 capsule 0   • sertraline (ZOLOFT) 50 MG tablet Take 1 tablet by mouth Daily. 30 tablet 0   • traZODone (DESYREL) 50 MG tablet Take 0.5-1 tablets by mouth Every Night. 30 tablet 0   • metoprolol tartrate (LOPRESSOR) 25 MG tablet 2 (Two) Times a Day.       No current facility-administered medications for this visit.        Mental Status Exam:   Hygiene:   Good  Cooperation:  Cooperative  Eye Contact:  Good  Psychomotor Behavior:  Appropriate  Affect:  Full range  Hopelessness: Denies  Speech:  Normal  Thought Process:  Goal directed and Linear  Thought Content:  Mood congurent  Suicidal:  None  Homicidal:  None  Hallucinations:  None  Delusion:  None  Memory:  Intact  Orientation:  Person, Place, Time and Situation  Reliability:  good  Insight:  Good  Judgement:  Good  Impulse Control:  Good  Physical/Medical Issues:  Yes hpt    Assessment/Plan   Diagnoses and all orders for this visit:    Severe episode of recurrent major depressive disorder, without psychotic features (CMS/HCC)  -     sertraline (ZOLOFT) 50 MG tablet; Take 1 tablet by mouth Daily.  -     mirtazapine (REMERON) 45 MG tablet; Take 1 tablet by mouth Every Night.  -     OLANZapine (zyPREXA) 20 MG tablet; Take 1 tablet by mouth Every Night.  -     OXcarbazepine (TRILEPTAL) 600 MG tablet; Take 1 tablet by mouth Every 12 (Twelve) Hours.  -     traZODone (DESYREL) 50 MG tablet; Take 0.5-1 tablets by mouth Every Night.  -     prazosin (MINIPRESS) 5 MG capsule; Take 1 capsule by mouth every night at bedtime.    Post traumatic stress disorder (PTSD)  -     busPIRone  (BUSPAR) 30 MG tablet; Take 1 tablet by mouth 2 (Two) Times a Day.  -     mirtazapine (REMERON) 45 MG tablet; Take 1 tablet by mouth Every Night.  -     OLANZapine (zyPREXA) 20 MG tablet; Take 1 tablet by mouth Every Night.  -     OXcarbazepine (TRILEPTAL) 600 MG tablet; Take 1 tablet by mouth Every 12 (Twelve) Hours.  -     prazosin (MINIPRESS) 5 MG capsule; Take 1 capsule by mouth every night at bedtime.    Generalized anxiety disorder  -     busPIRone (BUSPAR) 30 MG tablet; Take 1 tablet by mouth 2 (Two) Times a Day.  -     sertraline (ZOLOFT) 50 MG tablet; Take 1 tablet by mouth Daily.    Marijuana dependence (CMS/[facility])        Discussed medication options and any side effects as well as his worsening symptoms of depression and anxiety.     Continue Trileptal 600 mg twice daily.  Continue Remeron 45 mg at night for sleep.  Continue trazodone 25 mg at night as needed for sleep, as patient states he only takes a quarter dose at times.  Continue Zyprexa 20 mg at night for depression.  Continue BuSpar to 30 mg twice daily for worsening anxiety. Continue Minipress  5 mg at night since he is having worsening nightmares, patient states that that is being helpful encourage patient that we would reevaluate and look at tapering back down if we needed to on the Minipress.  Patient notes that the hydroxyzine he only used as needed for sleep and has not been using it much so we will discontinue.  Patient did note that BuSpar was effective for him and he did want to continue.  Fluoxetine has been discontinued.  Increase sertraline to 50 mg daily for depression and anxiety as patient has tolerated well.   Discussed the risks, benefits, and side effects of the medication; client acknowledged and verbally consented.  Patient is aware to contact the [facility] with any worsening of symptom.  Patient is agreeable to go to the ER or call 911 should they begin SI/HI.  Patient denies any side effects to the current medications.   Discussed the use of antipsychotics as well as mood stabilizers as well as risk and benefits and side effects patient agreed.  Lab work was once again requested from his PCP as he stated he just got last month if not available informed patient that we would need to add additional lab work.  Urged patient to keep following up with his PCP.    Prognosis: Guarded dependent on medication/follow up and treatment plan compliance.  Functionality: pt showing improvements in important areas of daily functioning regarding his depression and anxiety but still noting some issues will end just the medication and then follow-up over the next 3 months as patient stated that was easier for him and he would call if anything worsen.  Patient wanted to follow-up in 3 months as that is easier for him with his appointments but stated he would call with any concerns encouraged the patient that I would be on maternity leave and he would follow-up with another provider as patient was agreeable with this and encouraged him to call sooner if he had any questions or concerns.      The patient was instructed to call clinic as needed or go to ER if in crisis.  Patient was instructed to immediately call 911 or come to the nearest emergency room for thoughts to harm self or others.    Errors in dictation may reflect use of voice recognition software and not all errors in transcription may have been detected prior to signing.

## 2020-02-05 DIAGNOSIS — F33.2 SEVERE EPISODE OF RECURRENT MAJOR DEPRESSIVE DISORDER, WITHOUT PSYCHOTIC FEATURES (HCC): ICD-10-CM

## 2020-02-05 DIAGNOSIS — F43.10 POST TRAUMATIC STRESS DISORDER (PTSD): ICD-10-CM

## 2020-02-05 DIAGNOSIS — F41.1 GENERALIZED ANXIETY DISORDER: ICD-10-CM

## 2020-02-06 RX ORDER — MIRTAZAPINE 45 MG/1
45 TABLET, FILM COATED ORAL NIGHTLY
Qty: 30 TABLET | Refills: 0 | Status: SHIPPED | OUTPATIENT
Start: 2020-02-06 | End: 2020-03-30 | Stop reason: SDUPTHER

## 2020-02-06 RX ORDER — BUSPIRONE HYDROCHLORIDE 30 MG/1
30 TABLET ORAL 2 TIMES DAILY
Qty: 60 TABLET | Refills: 0 | Status: SHIPPED | OUTPATIENT
Start: 2020-02-06 | End: 2020-03-02

## 2020-02-06 RX ORDER — PRAZOSIN HYDROCHLORIDE 5 MG/1
5 CAPSULE ORAL
Qty: 30 CAPSULE | Refills: 0 | Status: SHIPPED | OUTPATIENT
Start: 2020-02-06 | End: 2020-03-02

## 2020-02-06 RX ORDER — OLANZAPINE 20 MG/1
20 TABLET ORAL NIGHTLY
Qty: 30 TABLET | Refills: 0 | Status: SHIPPED | OUTPATIENT
Start: 2020-02-06 | End: 2020-03-30 | Stop reason: SDUPTHER

## 2020-02-06 RX ORDER — TRAZODONE HYDROCHLORIDE 50 MG/1
25-50 TABLET ORAL NIGHTLY
Qty: 30 TABLET | Refills: 0 | Status: SHIPPED | OUTPATIENT
Start: 2020-02-06 | End: 2020-03-30 | Stop reason: SDUPTHER

## 2020-02-06 RX ORDER — OXCARBAZEPINE 600 MG/1
600 TABLET, FILM COATED ORAL EVERY 12 HOURS SCHEDULED
Qty: 60 TABLET | Refills: 0 | Status: SHIPPED | OUTPATIENT
Start: 2020-02-06 | End: 2020-03-30 | Stop reason: SDUPTHER

## 2020-02-06 RX ORDER — HYDROXYZINE HYDROCHLORIDE 25 MG/1
25 TABLET, FILM COATED ORAL 3 TIMES DAILY PRN
Qty: 90 TABLET | Refills: 0 | Status: SHIPPED | OUTPATIENT
Start: 2020-02-06 | End: 2020-03-02

## 2020-02-06 NOTE — TELEPHONE ENCOUNTER
Why is he seeing Ashley on the 20th? I thought I booked him out further. I will refill until seen cause I can see him on the 20th.

## 2020-03-02 DIAGNOSIS — F33.2 SEVERE EPISODE OF RECURRENT MAJOR DEPRESSIVE DISORDER, WITHOUT PSYCHOTIC FEATURES (HCC): ICD-10-CM

## 2020-03-02 DIAGNOSIS — F43.10 POST TRAUMATIC STRESS DISORDER (PTSD): ICD-10-CM

## 2020-03-02 DIAGNOSIS — F41.1 GENERALIZED ANXIETY DISORDER: ICD-10-CM

## 2020-03-02 RX ORDER — HYDROXYZINE HYDROCHLORIDE 25 MG/1
25 TABLET, FILM COATED ORAL 3 TIMES DAILY PRN
Qty: 90 TABLET | Refills: 0 | Status: SHIPPED | OUTPATIENT
Start: 2020-03-02 | End: 2020-03-30 | Stop reason: SDUPTHER

## 2020-03-02 RX ORDER — PRAZOSIN HYDROCHLORIDE 5 MG/1
5 CAPSULE ORAL
Qty: 30 CAPSULE | Refills: 0 | Status: SHIPPED | OUTPATIENT
Start: 2020-03-02 | End: 2020-03-30 | Stop reason: SDUPTHER

## 2020-03-02 RX ORDER — BUSPIRONE HYDROCHLORIDE 30 MG/1
TABLET ORAL
Qty: 60 TABLET | Refills: 0 | Status: SHIPPED | OUTPATIENT
Start: 2020-03-02 | End: 2020-03-30 | Stop reason: SDUPTHER

## 2020-03-26 DIAGNOSIS — F33.2 SEVERE EPISODE OF RECURRENT MAJOR DEPRESSIVE DISORDER, WITHOUT PSYCHOTIC FEATURES (HCC): ICD-10-CM

## 2020-03-26 DIAGNOSIS — F41.1 GENERALIZED ANXIETY DISORDER: ICD-10-CM

## 2020-03-30 DIAGNOSIS — F43.10 POST TRAUMATIC STRESS DISORDER (PTSD): ICD-10-CM

## 2020-03-30 DIAGNOSIS — F41.1 GENERALIZED ANXIETY DISORDER: ICD-10-CM

## 2020-03-30 DIAGNOSIS — F33.2 SEVERE EPISODE OF RECURRENT MAJOR DEPRESSIVE DISORDER, WITHOUT PSYCHOTIC FEATURES (HCC): ICD-10-CM

## 2020-03-30 RX ORDER — OLANZAPINE 20 MG/1
20 TABLET ORAL NIGHTLY
Qty: 30 TABLET | Refills: 0 | Status: SHIPPED | OUTPATIENT
Start: 2020-03-30 | End: 2020-04-20 | Stop reason: SDUPTHER

## 2020-03-30 RX ORDER — MIRTAZAPINE 45 MG/1
45 TABLET, FILM COATED ORAL NIGHTLY
Qty: 30 TABLET | Refills: 0 | Status: SHIPPED | OUTPATIENT
Start: 2020-03-30 | End: 2020-04-20 | Stop reason: SDUPTHER

## 2020-03-30 RX ORDER — HYDROXYZINE HYDROCHLORIDE 25 MG/1
25 TABLET, FILM COATED ORAL 3 TIMES DAILY PRN
Qty: 90 TABLET | Refills: 0 | Status: SHIPPED | OUTPATIENT
Start: 2020-03-30 | End: 2020-04-20 | Stop reason: SDUPTHER

## 2020-03-30 RX ORDER — BUSPIRONE HYDROCHLORIDE 30 MG/1
30 TABLET ORAL 2 TIMES DAILY
Qty: 60 TABLET | Refills: 0 | Status: SHIPPED | OUTPATIENT
Start: 2020-03-30 | End: 2020-04-20

## 2020-03-30 RX ORDER — OXCARBAZEPINE 600 MG/1
600 TABLET, FILM COATED ORAL EVERY 12 HOURS SCHEDULED
Qty: 60 TABLET | Refills: 0 | Status: SHIPPED | OUTPATIENT
Start: 2020-03-30 | End: 2020-04-20 | Stop reason: SDUPTHER

## 2020-03-30 RX ORDER — TRAZODONE HYDROCHLORIDE 50 MG/1
25-50 TABLET ORAL NIGHTLY
Qty: 30 TABLET | Refills: 0 | Status: SHIPPED | OUTPATIENT
Start: 2020-03-30 | End: 2020-04-20 | Stop reason: SDUPTHER

## 2020-03-30 RX ORDER — PRAZOSIN HYDROCHLORIDE 5 MG/1
5 CAPSULE ORAL
Qty: 30 CAPSULE | Refills: 0 | Status: SHIPPED | OUTPATIENT
Start: 2020-03-30 | End: 2020-04-20 | Stop reason: SDUPTHER

## 2020-04-20 DIAGNOSIS — F41.1 GENERALIZED ANXIETY DISORDER: ICD-10-CM

## 2020-04-20 DIAGNOSIS — F43.10 POST TRAUMATIC STRESS DISORDER (PTSD): ICD-10-CM

## 2020-04-20 DIAGNOSIS — F33.2 SEVERE EPISODE OF RECURRENT MAJOR DEPRESSIVE DISORDER, WITHOUT PSYCHOTIC FEATURES (HCC): ICD-10-CM

## 2020-04-20 RX ORDER — OLANZAPINE 20 MG/1
20 TABLET ORAL NIGHTLY
Qty: 30 TABLET | Refills: 0 | Status: SHIPPED | OUTPATIENT
Start: 2020-04-20 | End: 2020-05-18 | Stop reason: SDUPTHER

## 2020-04-20 RX ORDER — MIRTAZAPINE 45 MG/1
45 TABLET, FILM COATED ORAL NIGHTLY
Qty: 30 TABLET | Refills: 0 | Status: SHIPPED | OUTPATIENT
Start: 2020-04-20 | End: 2020-05-18 | Stop reason: SDUPTHER

## 2020-04-20 RX ORDER — TRAZODONE HYDROCHLORIDE 50 MG/1
25-50 TABLET ORAL NIGHTLY
Qty: 30 TABLET | Refills: 0 | Status: SHIPPED | OUTPATIENT
Start: 2020-04-20 | End: 2020-05-18 | Stop reason: SDUPTHER

## 2020-04-20 RX ORDER — OXCARBAZEPINE 600 MG/1
600 TABLET, FILM COATED ORAL EVERY 12 HOURS SCHEDULED
Qty: 60 TABLET | Refills: 0 | Status: SHIPPED | OUTPATIENT
Start: 2020-04-20 | End: 2020-05-18 | Stop reason: SDUPTHER

## 2020-04-20 RX ORDER — BUSPIRONE HYDROCHLORIDE 30 MG/1
TABLET ORAL
Qty: 60 TABLET | Refills: 0 | Status: SHIPPED | OUTPATIENT
Start: 2020-04-20 | End: 2020-05-18 | Stop reason: SDUPTHER

## 2020-04-20 RX ORDER — HYDROXYZINE HYDROCHLORIDE 25 MG/1
25 TABLET, FILM COATED ORAL 3 TIMES DAILY PRN
Qty: 90 TABLET | Refills: 0 | Status: SHIPPED | OUTPATIENT
Start: 2020-04-20 | End: 2020-05-18 | Stop reason: SDUPTHER

## 2020-04-20 RX ORDER — PRAZOSIN HYDROCHLORIDE 5 MG/1
5 CAPSULE ORAL
Qty: 30 CAPSULE | Refills: 0 | Status: SHIPPED | OUTPATIENT
Start: 2020-04-20 | End: 2020-05-18 | Stop reason: SDUPTHER

## 2020-05-18 DIAGNOSIS — F33.2 SEVERE EPISODE OF RECURRENT MAJOR DEPRESSIVE DISORDER, WITHOUT PSYCHOTIC FEATURES (HCC): ICD-10-CM

## 2020-05-18 DIAGNOSIS — F41.1 GENERALIZED ANXIETY DISORDER: ICD-10-CM

## 2020-05-18 DIAGNOSIS — F43.10 POST TRAUMATIC STRESS DISORDER (PTSD): ICD-10-CM

## 2020-05-18 RX ORDER — HYDROXYZINE HYDROCHLORIDE 25 MG/1
25 TABLET, FILM COATED ORAL 3 TIMES DAILY PRN
Qty: 90 TABLET | Refills: 0 | Status: SHIPPED | OUTPATIENT
Start: 2020-05-18 | End: 2020-06-15 | Stop reason: SDUPTHER

## 2020-05-18 RX ORDER — OXCARBAZEPINE 600 MG/1
600 TABLET, FILM COATED ORAL EVERY 12 HOURS SCHEDULED
Qty: 60 TABLET | Refills: 0 | Status: SHIPPED | OUTPATIENT
Start: 2020-05-18 | End: 2020-06-15 | Stop reason: SDUPTHER

## 2020-05-18 RX ORDER — OLANZAPINE 20 MG/1
20 TABLET ORAL NIGHTLY
Qty: 30 TABLET | Refills: 0 | Status: SHIPPED | OUTPATIENT
Start: 2020-05-18 | End: 2020-06-15 | Stop reason: SDUPTHER

## 2020-05-18 RX ORDER — PRAZOSIN HYDROCHLORIDE 5 MG/1
5 CAPSULE ORAL
Qty: 30 CAPSULE | Refills: 0 | Status: SHIPPED | OUTPATIENT
Start: 2020-05-18 | End: 2020-06-15 | Stop reason: SDUPTHER

## 2020-05-18 RX ORDER — TRAZODONE HYDROCHLORIDE 50 MG/1
25-50 TABLET ORAL NIGHTLY
Qty: 30 TABLET | Refills: 0 | Status: SHIPPED | OUTPATIENT
Start: 2020-05-18 | End: 2020-06-15 | Stop reason: SDUPTHER

## 2020-05-18 RX ORDER — MIRTAZAPINE 45 MG/1
45 TABLET, FILM COATED ORAL NIGHTLY
Qty: 30 TABLET | Refills: 0 | Status: SHIPPED | OUTPATIENT
Start: 2020-05-18 | End: 2020-06-15 | Stop reason: SDUPTHER

## 2020-05-18 RX ORDER — BUSPIRONE HYDROCHLORIDE 30 MG/1
30 TABLET ORAL 2 TIMES DAILY
Qty: 60 TABLET | Refills: 0 | Status: SHIPPED | OUTPATIENT
Start: 2020-05-18 | End: 2020-06-15 | Stop reason: SDUPTHER

## 2020-06-15 DIAGNOSIS — F33.2 SEVERE EPISODE OF RECURRENT MAJOR DEPRESSIVE DISORDER, WITHOUT PSYCHOTIC FEATURES (HCC): ICD-10-CM

## 2020-06-15 DIAGNOSIS — F41.1 GENERALIZED ANXIETY DISORDER: ICD-10-CM

## 2020-06-15 DIAGNOSIS — F43.10 POST TRAUMATIC STRESS DISORDER (PTSD): ICD-10-CM

## 2020-06-15 RX ORDER — OLANZAPINE 20 MG/1
20 TABLET ORAL NIGHTLY
Qty: 30 TABLET | Refills: 0 | Status: SHIPPED | OUTPATIENT
Start: 2020-06-15 | End: 2020-07-06 | Stop reason: SDUPTHER

## 2020-06-15 RX ORDER — TRAZODONE HYDROCHLORIDE 50 MG/1
25-50 TABLET ORAL NIGHTLY
Qty: 30 TABLET | Refills: 0 | Status: SHIPPED | OUTPATIENT
Start: 2020-06-15 | End: 2020-07-06 | Stop reason: SDUPTHER

## 2020-06-15 RX ORDER — BUSPIRONE HYDROCHLORIDE 30 MG/1
30 TABLET ORAL 2 TIMES DAILY
Qty: 60 TABLET | Refills: 0 | Status: SHIPPED | OUTPATIENT
Start: 2020-06-15 | End: 2020-07-06 | Stop reason: SDUPTHER

## 2020-06-15 RX ORDER — MIRTAZAPINE 45 MG/1
45 TABLET, FILM COATED ORAL NIGHTLY
Qty: 30 TABLET | Refills: 0 | Status: SHIPPED | OUTPATIENT
Start: 2020-06-15 | End: 2020-07-06 | Stop reason: SDUPTHER

## 2020-06-15 RX ORDER — HYDROXYZINE HYDROCHLORIDE 25 MG/1
25 TABLET, FILM COATED ORAL 3 TIMES DAILY PRN
Qty: 90 TABLET | Refills: 0 | Status: SHIPPED | OUTPATIENT
Start: 2020-06-15 | End: 2020-07-06 | Stop reason: SDUPTHER

## 2020-06-15 RX ORDER — PRAZOSIN HYDROCHLORIDE 5 MG/1
5 CAPSULE ORAL
Qty: 30 CAPSULE | Refills: 0 | Status: SHIPPED | OUTPATIENT
Start: 2020-06-15 | End: 2020-07-06 | Stop reason: SDUPTHER

## 2020-06-15 RX ORDER — OXCARBAZEPINE 600 MG/1
600 TABLET, FILM COATED ORAL EVERY 12 HOURS SCHEDULED
Qty: 60 TABLET | Refills: 0 | Status: SHIPPED | OUTPATIENT
Start: 2020-06-15 | End: 2020-07-06 | Stop reason: SDUPTHER

## 2020-07-06 DIAGNOSIS — F43.10 POST TRAUMATIC STRESS DISORDER (PTSD): ICD-10-CM

## 2020-07-06 DIAGNOSIS — F41.1 GENERALIZED ANXIETY DISORDER: ICD-10-CM

## 2020-07-06 DIAGNOSIS — F33.2 SEVERE EPISODE OF RECURRENT MAJOR DEPRESSIVE DISORDER, WITHOUT PSYCHOTIC FEATURES (HCC): ICD-10-CM

## 2020-07-06 RX ORDER — PRAZOSIN HYDROCHLORIDE 5 MG/1
5 CAPSULE ORAL
Qty: 30 CAPSULE | Refills: 0 | Status: SHIPPED | OUTPATIENT
Start: 2020-07-06 | End: 2020-08-10 | Stop reason: SDUPTHER

## 2020-07-06 RX ORDER — BUSPIRONE HYDROCHLORIDE 30 MG/1
30 TABLET ORAL 2 TIMES DAILY
Qty: 60 TABLET | Refills: 0 | Status: SHIPPED | OUTPATIENT
Start: 2020-07-06 | End: 2020-08-10 | Stop reason: SDUPTHER

## 2020-07-06 RX ORDER — MIRTAZAPINE 45 MG/1
45 TABLET, FILM COATED ORAL NIGHTLY
Qty: 30 TABLET | Refills: 0 | Status: SHIPPED | OUTPATIENT
Start: 2020-07-06 | End: 2020-08-10 | Stop reason: SDUPTHER

## 2020-07-06 RX ORDER — HYDROXYZINE HYDROCHLORIDE 25 MG/1
25 TABLET, FILM COATED ORAL 3 TIMES DAILY PRN
Qty: 90 TABLET | Refills: 0 | Status: SHIPPED | OUTPATIENT
Start: 2020-07-06 | End: 2020-08-10 | Stop reason: SDUPTHER

## 2020-07-06 RX ORDER — OLANZAPINE 20 MG/1
20 TABLET ORAL NIGHTLY
Qty: 30 TABLET | Refills: 0 | Status: SHIPPED | OUTPATIENT
Start: 2020-07-06 | End: 2020-08-10 | Stop reason: SDUPTHER

## 2020-07-06 RX ORDER — OXCARBAZEPINE 600 MG/1
600 TABLET, FILM COATED ORAL EVERY 12 HOURS SCHEDULED
Qty: 60 TABLET | Refills: 0 | Status: SHIPPED | OUTPATIENT
Start: 2020-07-06 | End: 2020-08-10 | Stop reason: SDUPTHER

## 2020-07-06 RX ORDER — TRAZODONE HYDROCHLORIDE 50 MG/1
25-50 TABLET ORAL NIGHTLY
Qty: 30 TABLET | Refills: 0 | Status: SHIPPED | OUTPATIENT
Start: 2020-07-06 | End: 2020-08-10 | Stop reason: SDUPTHER

## 2020-08-10 DIAGNOSIS — F43.10 POST TRAUMATIC STRESS DISORDER (PTSD): ICD-10-CM

## 2020-08-10 DIAGNOSIS — F41.1 GENERALIZED ANXIETY DISORDER: ICD-10-CM

## 2020-08-10 DIAGNOSIS — F33.2 SEVERE EPISODE OF RECURRENT MAJOR DEPRESSIVE DISORDER, WITHOUT PSYCHOTIC FEATURES (HCC): ICD-10-CM

## 2020-08-10 RX ORDER — MIRTAZAPINE 45 MG/1
45 TABLET, FILM COATED ORAL NIGHTLY
Qty: 30 TABLET | Refills: 0 | Status: SHIPPED | OUTPATIENT
Start: 2020-08-10 | End: 2020-09-08 | Stop reason: SDUPTHER

## 2020-08-10 RX ORDER — OLANZAPINE 20 MG/1
20 TABLET ORAL NIGHTLY
Qty: 30 TABLET | Refills: 0 | Status: SHIPPED | OUTPATIENT
Start: 2020-08-10 | End: 2020-09-08 | Stop reason: SDUPTHER

## 2020-08-10 RX ORDER — TRAZODONE HYDROCHLORIDE 50 MG/1
25-50 TABLET ORAL NIGHTLY
Qty: 30 TABLET | Refills: 0 | Status: SHIPPED | OUTPATIENT
Start: 2020-08-10 | End: 2020-09-08 | Stop reason: SDUPTHER

## 2020-08-10 RX ORDER — OXCARBAZEPINE 600 MG/1
600 TABLET, FILM COATED ORAL EVERY 12 HOURS SCHEDULED
Qty: 60 TABLET | Refills: 0 | Status: SHIPPED | OUTPATIENT
Start: 2020-08-10 | End: 2020-09-08 | Stop reason: SDUPTHER

## 2020-08-10 RX ORDER — HYDROXYZINE HYDROCHLORIDE 25 MG/1
25 TABLET, FILM COATED ORAL 3 TIMES DAILY PRN
Qty: 90 TABLET | Refills: 0 | Status: SHIPPED | OUTPATIENT
Start: 2020-08-10 | End: 2020-09-08 | Stop reason: SDUPTHER

## 2020-08-10 RX ORDER — BUSPIRONE HYDROCHLORIDE 30 MG/1
30 TABLET ORAL 2 TIMES DAILY
Qty: 60 TABLET | Refills: 0 | Status: SHIPPED | OUTPATIENT
Start: 2020-08-10 | End: 2020-09-08 | Stop reason: SDUPTHER

## 2020-08-10 RX ORDER — PRAZOSIN HYDROCHLORIDE 5 MG/1
5 CAPSULE ORAL
Qty: 30 CAPSULE | Refills: 0 | Status: SHIPPED | OUTPATIENT
Start: 2020-08-10 | End: 2020-09-08 | Stop reason: SDUPTHER

## 2020-09-08 DIAGNOSIS — F43.10 POST TRAUMATIC STRESS DISORDER (PTSD): ICD-10-CM

## 2020-09-08 DIAGNOSIS — F41.1 GENERALIZED ANXIETY DISORDER: ICD-10-CM

## 2020-09-08 DIAGNOSIS — F33.2 SEVERE EPISODE OF RECURRENT MAJOR DEPRESSIVE DISORDER, WITHOUT PSYCHOTIC FEATURES (HCC): ICD-10-CM

## 2020-09-08 RX ORDER — TRAZODONE HYDROCHLORIDE 50 MG/1
25-50 TABLET ORAL NIGHTLY
Qty: 30 TABLET | Refills: 0 | Status: SHIPPED | OUTPATIENT
Start: 2020-09-08 | End: 2020-10-05 | Stop reason: SDUPTHER

## 2020-09-08 RX ORDER — OXCARBAZEPINE 600 MG/1
600 TABLET, FILM COATED ORAL EVERY 12 HOURS SCHEDULED
Qty: 60 TABLET | Refills: 0 | Status: SHIPPED | OUTPATIENT
Start: 2020-09-08 | End: 2020-10-05 | Stop reason: SDUPTHER

## 2020-09-08 RX ORDER — BUSPIRONE HYDROCHLORIDE 30 MG/1
30 TABLET ORAL 2 TIMES DAILY
Qty: 60 TABLET | Refills: 0 | Status: SHIPPED | OUTPATIENT
Start: 2020-09-08 | End: 2020-10-05 | Stop reason: SDUPTHER

## 2020-09-08 RX ORDER — MIRTAZAPINE 45 MG/1
45 TABLET, FILM COATED ORAL NIGHTLY
Qty: 30 TABLET | Refills: 0 | Status: SHIPPED | OUTPATIENT
Start: 2020-09-08 | End: 2020-10-05 | Stop reason: SDUPTHER

## 2020-09-08 RX ORDER — HYDROXYZINE HYDROCHLORIDE 25 MG/1
25 TABLET, FILM COATED ORAL 3 TIMES DAILY PRN
Qty: 90 TABLET | Refills: 0 | Status: SHIPPED | OUTPATIENT
Start: 2020-09-08 | End: 2020-10-05 | Stop reason: SDUPTHER

## 2020-09-08 RX ORDER — PRAZOSIN HYDROCHLORIDE 5 MG/1
5 CAPSULE ORAL
Qty: 30 CAPSULE | Refills: 0 | Status: SHIPPED | OUTPATIENT
Start: 2020-09-08 | End: 2020-10-05 | Stop reason: SDUPTHER

## 2020-09-08 RX ORDER — OLANZAPINE 20 MG/1
20 TABLET ORAL NIGHTLY
Qty: 30 TABLET | Refills: 0 | Status: SHIPPED | OUTPATIENT
Start: 2020-09-08 | End: 2020-10-05 | Stop reason: SDUPTHER

## 2020-09-24 DIAGNOSIS — F43.10 POST TRAUMATIC STRESS DISORDER (PTSD): ICD-10-CM

## 2020-09-24 DIAGNOSIS — F41.1 GENERALIZED ANXIETY DISORDER: ICD-10-CM

## 2020-09-24 RX ORDER — BUSPIRONE HYDROCHLORIDE 30 MG/1
TABLET ORAL
Qty: 60 TABLET | Refills: 0 | OUTPATIENT
Start: 2020-09-24

## 2020-10-05 DIAGNOSIS — F33.2 SEVERE EPISODE OF RECURRENT MAJOR DEPRESSIVE DISORDER, WITHOUT PSYCHOTIC FEATURES (HCC): ICD-10-CM

## 2020-10-05 DIAGNOSIS — F41.1 GENERALIZED ANXIETY DISORDER: ICD-10-CM

## 2020-10-05 DIAGNOSIS — F43.10 POST TRAUMATIC STRESS DISORDER (PTSD): ICD-10-CM

## 2020-10-05 RX ORDER — OXCARBAZEPINE 600 MG/1
600 TABLET, FILM COATED ORAL EVERY 12 HOURS SCHEDULED
Qty: 60 TABLET | Refills: 0 | Status: SHIPPED | OUTPATIENT
Start: 2020-10-05 | End: 2020-11-02 | Stop reason: SDUPTHER

## 2020-10-05 RX ORDER — TRAZODONE HYDROCHLORIDE 50 MG/1
25-50 TABLET ORAL NIGHTLY
Qty: 30 TABLET | Refills: 0 | Status: SHIPPED | OUTPATIENT
Start: 2020-10-05 | End: 2020-11-02 | Stop reason: SDUPTHER

## 2020-10-05 RX ORDER — OLANZAPINE 20 MG/1
20 TABLET ORAL NIGHTLY
Qty: 30 TABLET | Refills: 0 | Status: SHIPPED | OUTPATIENT
Start: 2020-10-05 | End: 2020-11-02 | Stop reason: SDUPTHER

## 2020-10-05 RX ORDER — BUSPIRONE HYDROCHLORIDE 30 MG/1
30 TABLET ORAL 2 TIMES DAILY
Qty: 60 TABLET | Refills: 0 | Status: SHIPPED | OUTPATIENT
Start: 2020-10-05 | End: 2020-11-02 | Stop reason: SDUPTHER

## 2020-10-05 RX ORDER — PRAZOSIN HYDROCHLORIDE 5 MG/1
5 CAPSULE ORAL
Qty: 30 CAPSULE | Refills: 0 | Status: SHIPPED | OUTPATIENT
Start: 2020-10-05 | End: 2020-11-02 | Stop reason: SDUPTHER

## 2020-10-05 RX ORDER — MIRTAZAPINE 45 MG/1
45 TABLET, FILM COATED ORAL NIGHTLY
Qty: 30 TABLET | Refills: 0 | Status: SHIPPED | OUTPATIENT
Start: 2020-10-05 | End: 2020-11-02 | Stop reason: SDUPTHER

## 2020-10-05 RX ORDER — HYDROXYZINE HYDROCHLORIDE 25 MG/1
25 TABLET, FILM COATED ORAL 3 TIMES DAILY PRN
Qty: 90 TABLET | Refills: 0 | Status: SHIPPED | OUTPATIENT
Start: 2020-10-05 | End: 2020-11-02 | Stop reason: SDUPTHER

## 2020-11-02 DIAGNOSIS — F33.2 SEVERE EPISODE OF RECURRENT MAJOR DEPRESSIVE DISORDER, WITHOUT PSYCHOTIC FEATURES (HCC): ICD-10-CM

## 2020-11-02 DIAGNOSIS — F41.1 GENERALIZED ANXIETY DISORDER: ICD-10-CM

## 2020-11-02 DIAGNOSIS — F43.10 POST TRAUMATIC STRESS DISORDER (PTSD): ICD-10-CM

## 2020-11-02 RX ORDER — TRAZODONE HYDROCHLORIDE 50 MG/1
25-50 TABLET ORAL NIGHTLY
Qty: 30 TABLET | Refills: 0 | Status: SHIPPED | OUTPATIENT
Start: 2020-11-02 | End: 2020-11-24 | Stop reason: SDUPTHER

## 2020-11-02 RX ORDER — MIRTAZAPINE 45 MG/1
45 TABLET, FILM COATED ORAL NIGHTLY
Qty: 30 TABLET | Refills: 0 | Status: SHIPPED | OUTPATIENT
Start: 2020-11-02 | End: 2020-11-24 | Stop reason: SDUPTHER

## 2020-11-02 RX ORDER — HYDROXYZINE HYDROCHLORIDE 25 MG/1
25 TABLET, FILM COATED ORAL 3 TIMES DAILY PRN
Qty: 90 TABLET | Refills: 0 | Status: SHIPPED | OUTPATIENT
Start: 2020-11-02 | End: 2020-11-24 | Stop reason: SDUPTHER

## 2020-11-02 RX ORDER — OLANZAPINE 20 MG/1
20 TABLET ORAL NIGHTLY
Qty: 30 TABLET | Refills: 0 | Status: SHIPPED | OUTPATIENT
Start: 2020-11-02 | End: 2020-11-24 | Stop reason: SDUPTHER

## 2020-11-02 RX ORDER — BUSPIRONE HYDROCHLORIDE 30 MG/1
30 TABLET ORAL 2 TIMES DAILY
Qty: 60 TABLET | Refills: 0 | Status: SHIPPED | OUTPATIENT
Start: 2020-11-02 | End: 2020-11-24 | Stop reason: SDUPTHER

## 2020-11-02 RX ORDER — OXCARBAZEPINE 600 MG/1
600 TABLET, FILM COATED ORAL EVERY 12 HOURS SCHEDULED
Qty: 60 TABLET | Refills: 0 | Status: SHIPPED | OUTPATIENT
Start: 2020-11-02 | End: 2020-11-24 | Stop reason: SDUPTHER

## 2020-11-02 RX ORDER — PRAZOSIN HYDROCHLORIDE 5 MG/1
5 CAPSULE ORAL
Qty: 30 CAPSULE | Refills: 0 | Status: SHIPPED | OUTPATIENT
Start: 2020-11-02 | End: 2020-11-24 | Stop reason: SDUPTHER

## 2020-11-24 ENCOUNTER — OFFICE VISIT (OUTPATIENT)
Dept: PSYCHIATRY | Facility: CLINIC | Age: 50
End: 2020-11-24

## 2020-11-24 VITALS
HEIGHT: 69 IN | DIASTOLIC BLOOD PRESSURE: 80 MMHG | HEART RATE: 63 BPM | WEIGHT: 180.4 LBS | SYSTOLIC BLOOD PRESSURE: 130 MMHG | BODY MASS INDEX: 26.72 KG/M2 | TEMPERATURE: 97 F

## 2020-11-24 DIAGNOSIS — F33.2 SEVERE EPISODE OF RECURRENT MAJOR DEPRESSIVE DISORDER, WITHOUT PSYCHOTIC FEATURES (HCC): ICD-10-CM

## 2020-11-24 DIAGNOSIS — F17.210 CIGARETTE NICOTINE DEPENDENCE WITHOUT COMPLICATION: ICD-10-CM

## 2020-11-24 DIAGNOSIS — F12.20 MARIJUANA DEPENDENCE (HCC): ICD-10-CM

## 2020-11-24 DIAGNOSIS — F43.10 PTSD (POST-TRAUMATIC STRESS DISORDER): ICD-10-CM

## 2020-11-24 DIAGNOSIS — F41.0 PANIC ATTACKS: Primary | ICD-10-CM

## 2020-11-24 DIAGNOSIS — F32.0 CURRENT MILD EPISODE OF MAJOR DEPRESSIVE DISORDER, UNSPECIFIED WHETHER RECURRENT (HCC): ICD-10-CM

## 2020-11-24 DIAGNOSIS — Z79.899 MEDICATION MANAGEMENT: ICD-10-CM

## 2020-11-24 DIAGNOSIS — F41.1 GAD (GENERALIZED ANXIETY DISORDER): ICD-10-CM

## 2020-11-24 DIAGNOSIS — F43.10 POST TRAUMATIC STRESS DISORDER (PTSD): ICD-10-CM

## 2020-11-24 LAB
AMPHETAMINE CUT-OFF: ABNORMAL
BENZODIAZIPINE CUT-OFF: ABNORMAL
BUPRENORPHINE CUT-OFF: ABNORMAL
COCAINE CUT-OFF: ABNORMAL
EXTERNAL AMPHETAMINE SCREEN URINE: NEGATIVE
EXTERNAL BENZODIAZEPINE SCREEN URINE: NEGATIVE
EXTERNAL BUPRENORPHINE SCREEN URINE: NEGATIVE
EXTERNAL COCAINE SCREEN URINE: NEGATIVE
EXTERNAL MDMA: NEGATIVE
EXTERNAL METHADONE SCREEN URINE: NEGATIVE
EXTERNAL METHAMPHETAMINE SCREEN URINE: NEGATIVE
EXTERNAL OPIATES SCREEN URINE: NEGATIVE
EXTERNAL OXYCODONE SCREEN URINE: NEGATIVE
EXTERNAL THC SCREEN URINE: POSITIVE
MDMA CUT-OFF: ABNORMAL
METHADONE CUT-OFF: ABNORMAL
METHAMPHETAMINE CUT-OFF: ABNORMAL
OPIATES CUT-OFF: ABNORMAL
OXYCODONE CUT-OFF: ABNORMAL
THC CUT-OFF: ABNORMAL

## 2020-11-24 PROCEDURE — 90792 PSYCH DIAG EVAL W/MED SRVCS: CPT | Performed by: NURSE PRACTITIONER

## 2020-11-24 RX ORDER — OXCARBAZEPINE 600 MG/1
600 TABLET, FILM COATED ORAL EVERY 12 HOURS SCHEDULED
Qty: 60 TABLET | Refills: 2 | Status: SHIPPED | OUTPATIENT
Start: 2020-11-24 | End: 2021-05-10 | Stop reason: SDUPTHER

## 2020-11-24 RX ORDER — TRAZODONE HYDROCHLORIDE 50 MG/1
50 TABLET ORAL NIGHTLY
Qty: 30 TABLET | Refills: 2 | Status: SHIPPED | OUTPATIENT
Start: 2020-11-24 | End: 2021-02-15 | Stop reason: SDUPTHER

## 2020-11-24 RX ORDER — BUSPIRONE HYDROCHLORIDE 30 MG/1
30 TABLET ORAL 2 TIMES DAILY
Qty: 60 TABLET | Refills: 2 | Status: SHIPPED | OUTPATIENT
Start: 2020-11-24 | End: 2021-02-15 | Stop reason: SDUPTHER

## 2020-11-24 RX ORDER — OLANZAPINE 20 MG/1
20 TABLET ORAL NIGHTLY
Qty: 30 TABLET | Refills: 2 | Status: SHIPPED | OUTPATIENT
Start: 2020-11-24 | End: 2021-02-15 | Stop reason: SDUPTHER

## 2020-11-24 RX ORDER — HYDROXYZINE HYDROCHLORIDE 25 MG/1
25 TABLET, FILM COATED ORAL 3 TIMES DAILY PRN
Qty: 90 TABLET | Refills: 2 | Status: SHIPPED | OUTPATIENT
Start: 2020-11-24 | End: 2021-02-15 | Stop reason: SDUPTHER

## 2020-11-24 RX ORDER — AMLODIPINE BESYLATE 10 MG/1
TABLET ORAL
COMMUNITY
Start: 2020-10-28

## 2020-11-24 RX ORDER — METOPROLOL TARTRATE 100 MG/1
TABLET ORAL
COMMUNITY
Start: 2020-10-28

## 2020-11-24 RX ORDER — LISINOPRIL 40 MG/1
TABLET ORAL
COMMUNITY
Start: 2020-11-20

## 2020-11-24 RX ORDER — PRAZOSIN HYDROCHLORIDE 5 MG/1
5 CAPSULE ORAL
Qty: 30 CAPSULE | Refills: 2 | Status: SHIPPED | OUTPATIENT
Start: 2020-11-24 | End: 2021-02-15 | Stop reason: SDUPTHER

## 2020-11-24 RX ORDER — MIRTAZAPINE 45 MG/1
45 TABLET, FILM COATED ORAL NIGHTLY
Qty: 30 TABLET | Refills: 2 | Status: SHIPPED | OUTPATIENT
Start: 2020-11-24 | End: 2021-02-15 | Stop reason: SDUPTHER
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
## 2021-02-15 ENCOUNTER — TELEMEDICINE (OUTPATIENT)
Dept: PSYCHIATRY | Facility: CLINIC | Age: 51
End: 2021-02-15

## 2021-02-15 VITALS — BODY MASS INDEX: 24.77 KG/M2 | WEIGHT: 168 LBS

## 2021-02-15 DIAGNOSIS — F41.1 GAD (GENERALIZED ANXIETY DISORDER): ICD-10-CM

## 2021-02-15 DIAGNOSIS — F17.210 CIGARETTE NICOTINE DEPENDENCE WITHOUT COMPLICATION: ICD-10-CM

## 2021-02-15 DIAGNOSIS — F32.0 CURRENT MILD EPISODE OF MAJOR DEPRESSIVE DISORDER, UNSPECIFIED WHETHER RECURRENT (HCC): Primary | ICD-10-CM

## 2021-02-15 DIAGNOSIS — F43.10 PTSD (POST-TRAUMATIC STRESS DISORDER): ICD-10-CM

## 2021-02-15 DIAGNOSIS — F12.20 MARIJUANA DEPENDENCE (HCC): ICD-10-CM

## 2021-02-15 DIAGNOSIS — Z79.899 MEDICATION MANAGEMENT: ICD-10-CM

## 2021-02-15 PROCEDURE — 99214 OFFICE O/P EST MOD 30 MIN: CPT | Performed by: NURSE PRACTITIONER

## 2021-02-15 RX ORDER — SERTRALINE HYDROCHLORIDE 100 MG/1
100 TABLET, FILM COATED ORAL DAILY
Qty: 30 TABLET | Refills: 2 | Status: SHIPPED | OUTPATIENT
Start: 2021-02-15 | End: 2021-05-10 | Stop reason: SDUPTHER

## 2021-02-15 RX ORDER — TRAZODONE HYDROCHLORIDE 50 MG/1
50 TABLET ORAL NIGHTLY
Qty: 30 TABLET | Refills: 2 | Status: SHIPPED | OUTPATIENT
Start: 2021-02-15 | End: 2021-05-10 | Stop reason: SDUPTHER

## 2021-02-15 RX ORDER — PRAZOSIN HYDROCHLORIDE 5 MG/1
5 CAPSULE ORAL
Qty: 30 CAPSULE | Refills: 2 | Status: SHIPPED | OUTPATIENT
Start: 2021-02-15 | End: 2021-05-10 | Stop reason: SDUPTHER

## 2021-02-15 RX ORDER — OLANZAPINE 20 MG/1
20 TABLET ORAL NIGHTLY
Qty: 30 TABLET | Refills: 2 | Status: SHIPPED | OUTPATIENT
Start: 2021-02-15 | End: 2021-05-10 | Stop reason: SDUPTHER

## 2021-02-15 RX ORDER — BUSPIRONE HYDROCHLORIDE 30 MG/1
30 TABLET ORAL 2 TIMES DAILY
Qty: 60 TABLET | Refills: 2 | Status: SHIPPED | OUTPATIENT
Start: 2021-02-15 | End: 2021-05-10 | Stop reason: SDUPTHER

## 2021-02-15 RX ORDER — HYDROXYZINE HYDROCHLORIDE 25 MG/1
25 TABLET, FILM COATED ORAL 3 TIMES DAILY PRN
Qty: 90 TABLET | Refills: 2 | Status: SHIPPED | OUTPATIENT
Start: 2021-02-15 | End: 2021-05-10 | Stop reason: SDDI

## 2021-02-15 RX ORDER — MIRTAZAPINE 45 MG/1
45 TABLET, FILM COATED ORAL NIGHTLY
Qty: 30 TABLET | Refills: 2 | Status: SHIPPED | OUTPATIENT
Start: 2021-02-15 | End: 2021-05-10 | Stop reason: SDUPTHER

## 2021-02-15 NOTE — PROGRESS NOTES
This provider is located at [facility], [address] [place] [address], [facility], [address] using a telephone in a secure private environment. The Patient is seen remotely at their home address in [place], using a private telephone.  The patient is unable to be seen through a MyChart Video Visit through [facility] at today's encounter because technical difficulties therefore a telephone encounter was conducted. Patient is being evaluated/treated via telehealth by telephone, and stated they are in a secure environment for this session. The patient's condition being diagnosed/treated is appropriate for telemedicine. The provider identified herself as well as her credentials.   The patient, and/or patient's guardian, consent to be seen remotely, and when consent is given they understand that the consent allows for patient identifiable information to be sent to a third party as needed.   They may refuse to be seen remotely at any time. The electronic data is encrypted and password protected, and the patient and/or guardian has been advised of the potential risks to privacy not withstanding such measures.    You have chosen to receive care through a telephone visit. Do you consent to use a telephone visit for your medical care today? YES  This visit has been rescheduled as a phone visit to comply with patient safety concerns in accordance with CDC recommendations.    Subjective   Deni Lopez is a 50 y.o. male who presents today for 3-month follow up    Chief Complaint:  depression    History of Present Illness:  Deni reports he has been feeling more depressed for the last three months and he believes he needs his Zoloft dose increased. He's unable to identify the reason for his depression. His home situation is good. He has been riding his four-guardado. His PHQ score is 6. He identifies areas of anhedonia, feeling depressed, fatigue and difficulty concentrating as problematic. His VANDANA score is 8. Areas of feeling anxious, unable to  control worry, generalized worry,irritability and catastrophizing are impairing. Symptoms of anxiety and depression impair Deni's daily function.  He reports his sleep quality is good as long as he takes his medication. He denies problems with appetite and states his current weight is 168 lbs.     The following portions of the patient's history were reviewed and updated as appropriate: allergies, current medications, past family history, past medical history, past social history, past surgical history and problem list.    Past Medical History:  Past Medical History:   Diagnosis Date   • Anxiety    • Depression    • GERD (gastroesophageal reflux disease)    • Hepatitis C    • Hypertension    • Seizure (CMS/HCC)      Social History:  Social History     Socioeconomic History   • Marital status:      Spouse name: Not on file   • Number of children: Not on file   • Years of education: Not on file   • Highest education level: Not on file   Occupational History   • Occupation: unemployed   Tobacco Use   • Smoking status: Current Every Day Smoker     Packs/day: 1.00     Years: 20.00     Pack years: 20.00     Types: Cigarettes   • Smokeless tobacco: Never Used   Substance and Sexual Activity   • Alcohol use: No   • Drug use: No   • Sexual activity: Yes     Partners: Female     Family History:  Family History   Problem Relation Age of Onset   • Drug abuse Father      Past Surgical History:  No past surgical history on file.    Problem List:  Patient Active Problem List   Diagnosis   • Marijuana dependence (CMS/HCC)     Allergy:   Allergies   Allergen Reactions   • Codeine Sulfate Hives      Current Medications:   Current Outpatient Medications   Medication Sig Dispense Refill   • amLODIPine (NORVASC) 10 MG tablet      • busPIRone (BUSPAR) 30 MG tablet Take 1 tablet by mouth 2 (Two) Times a Day. Monitor for serotonin syndrome as discussed 60 tablet 2   • hydrOXYzine (ATARAX) 25 MG tablet Take 1 tablet by mouth 3  (Three) Times a Day As Needed for Anxiety. 90 tablet 2   • ibuprofen (ADVIL,MOTRIN) 600 MG tablet   0   • lisinopril (PRINIVIL,ZESTRIL) 40 MG tablet      • metoprolol tartrate (LOPRESSOR) 100 MG tablet      • mirtazapine (REMERON) 45 MG tablet Take 1 tablet by mouth Every Night. monitor for serotonin syndrome as discussed 30 tablet 2   • OLANZapine (zyPREXA) 20 MG tablet Take 1 tablet by mouth Every Night. 30 tablet 2   • OXcarbazepine (TRILEPTAL) 600 MG tablet Take 1 tablet by mouth Every 12 (Twelve) Hours. 60 tablet 2   • pantoprazole (PROTONIX) 40 MG EC tablet Daily.     • prazosin (MINIPRESS) 5 MG capsule Take 1 capsule by mouth every night at bedtime. 30 capsule 2   • sertraline (ZOLOFT) 100 MG tablet Take 1 tablet by mouth Daily. Monitor for serotonin syndrome as discussed 30 tablet 2   • traZODone (DESYREL) 50 MG tablet Take 1 tablet by mouth Every Night. Monitor for serotonin syndrome as discussed 30 tablet 2     No current facility-administered medications for this visit.      Appearance- unable to assess; telephone visit  Gait Station Strength-  Unable to assess; telephone visit    Review of Symptoms:    Review of Systems   Constitutional: Positive for activity change.   Psychiatric/Behavioral: Positive for agitation, decreased concentration and depressed mood. The patient is nervous/anxious.    All other systems reviewed and are negative.    Physical Exam:    Height at 69.06 inches.  Weight stated at 168pounds  Body mass index is 24.77 kg/m².  Due to extenuating circumstances and possible current health risks associated with the patient being present in a clinical setting (with current health restrictions in place in regards to possible COVID 19 transmission/exposure), the patient was seen remotely today via a telephone encounter.  Unable to obtain vital signs due to nature of remote visit.  .   Mental Status Exam:   Hygiene:   unable to assess; telephone visit  Cooperation:  Cooperative  Eye Contact:   unable to assess; telephone visit  Psychomotor Behavior:  unable to assess; telephone visit  Affect:  unable to assess; telephone visit  Mood: depressed  Hopelessness: 3  Speech:  Monotone and slow speech pattern  Thought Process:  Goal directed  Thought Content:  Normal  Suicidal:  None  Homicidal:  None  Hallucinations:  None  Delusion:  None  Memory:  Deficits  Orientation:  Person, Place, Time and Situation  Reliability:  fair  Insight:  Fair  Judgement:  Fair  Impulse Control:  Fair  Physical/Medical Issues:  No      PHQ-Score Total:  PHQ-9 Total Score: 6    Assessment/Plan   Diagnoses and all orders for this visit:    1. Current mild episode of major depressive disorder, unspecified whether recurrent (CMS/[facility]) (Primary)  -     OLANZapine (zyPREXA) 20 MG tablet; Take 1 tablet by mouth Every Night.  Dispense: 30 tablet; Refill: 2  -     busPIRone (BUSPAR) 30 MG tablet; Take 1 tablet by mouth 2 (Two) Times a Day. Monitor for serotonin syndrome as discussed  Dispense: 60 tablet; Refill: 2  -     mirtazapine (REMERON) 45 MG tablet; Take 1 tablet by mouth Every Night. monitor for serotonin syndrome as discussed  Dispense: 30 tablet; Refill: 2  -     sertraline (ZOLOFT) 100 MG tablet; Take 1 tablet by mouth Daily. Monitor for serotonin syndrome as discussed  Dispense: 30 tablet; Refill: 2  -     traZODone (DESYREL) 50 MG tablet; Take 1 tablet by mouth Every Night. Monitor for serotonin syndrome as discussed  Dispense: 30 tablet; Refill: 2    2. VANDANA (generalized anxiety disorder)  -     hydrOXYzine (ATARAX) 25 MG tablet; Take 1 tablet by mouth 3 (Three) Times a Day As Needed for Anxiety.  Dispense: 90 tablet; Refill: 2  -     busPIRone (BUSPAR) 30 MG tablet; Take 1 tablet by mouth 2 (Two) Times a Day. Monitor for serotonin syndrome as discussed  Dispense: 60 tablet; Refill: 2  -     mirtazapine (REMERON) 45 MG tablet; Take 1 tablet by mouth Every Night. monitor for serotonin syndrome as discussed  Dispense: 30 tablet;  Refill: 2  -     sertraline (ZOLOFT) 100 MG tablet; Take 1 tablet by mouth Daily. Monitor for serotonin syndrome as discussed  Dispense: 30 tablet; Refill: 2  -     traZODone (DESYREL) 50 MG tablet; Take 1 tablet by mouth Every Night. Monitor for serotonin syndrome as discussed  Dispense: 30 tablet; Refill: 2    3. PTSD (post-traumatic stress disorder)  -     busPIRone (BUSPAR) 30 MG tablet; Take 1 tablet by mouth 2 (Two) Times a Day. Monitor for serotonin syndrome as discussed  Dispense: 60 tablet; Refill: 2  -     prazosin (MINIPRESS) 5 MG capsule; Take 1 capsule by mouth every night at bedtime.  Dispense: 30 capsule; Refill: 2  -     sertraline (ZOLOFT) 100 MG tablet; Take 1 tablet by mouth Daily. Monitor for serotonin syndrome as discussed  Dispense: 30 tablet; Refill: 2    4. Marijuana dependence (CMS/HCC)    5. Cigarette nicotine dependence without complication    6. Medication management    Deni reports feeling more depressed the last three months and believes it's because his zoloft dose needed increased.  Patient cautioned about increased risk of serotonin syndrome associated with use of multiple serotonergic medications such as Remeron, Trazadone, Buspar and Zoloft.  Patient verbalizes understanding and believes benefits outweigh the risks.  Serotonin syndrome signs and symptoms reviewed such as but not limited to: Diarrhea, shivering, tremors, muscle rigidity, headache and confusion.  Patient agrees to go to ED should any of these occur.     -Increase zoloft to 100 mg daily for depressioin and anxiety with understanding of risks and agreement  he will monitor for serotonin syndrome  -Continue trileptal 600 twice daily for depression  -Continue buspar 30 mg twice daily for anxiety and depression  -Continue prazosin 5mg po hs for nightmares; montior B/P if felling faint  -Continue zyprexa 20 mg at night for depression insomnia  -Continue trazadone 50mg at night for insomnia, deporesion, anxiety  -Continue  "atrax 25 mg three times daiy as needed for anxiety  -Continue remeron 45 mg at night for insomnia, anxiety, depression  -Deni reports smoking marijuana \"a lot\" to help anxiety. Rarely does he go 2 days without smoking. Patient encouraged to abstain from use. Negative effects of THC use discussed and patient advised against use of THC. Long term effects reviewed including but not limited to:  potential interruption of brain connectivity,  poor memory, impaired cognition, psychosis fall, oversedation especially when used with opioids . He verbalizes he is aware of the risks  - Deni is a 1PPD smoker and is encouraged to quit smoking. The nature of nicotine addiction is discussed and the adverse health conditions related to smoking are reviewed. He is aware various alternatives are available to help and is not interested in smoking cessation at this time.   - records reviewed include labs received from PCP dated 9/1/20, PMHNP note 11/24/20    Visit Diagnoses:    ICD-10-CM ICD-9-CM   1. Current mild episode of major depressive disorder, unspecified whether recurrent (CMS/[facility])  F32.0 296.21   2. VANDANA (generalized anxiety disorder)  F41.1 300.02   3. PTSD (post-traumatic stress disorder)  F43.10 309.81   4. Marijuana dependence (CMS/[facility])  F12.20 304.30   5. Cigarette nicotine dependence without complication  F17.210 305.1   6. Medication management  Z79.899 V58.69     GOALS:  Short Term Goals: Patient will be compliant with medication, and patient will have no significant medication related side effects.  Patient will be engaged in psychotherapy as indicated.  Patient will report subjective improvement of symptoms. Patient will abstain from smoking marijuana.    Long term goals: To stabilize mood and treat/improve subjective symptoms, the patient will stay out of the hospital, the patient will be at an optimal level of functioning, and the patient will take all medications as prescribed.  The patient/guardian verbalized " understanding and agreement with goals that were mutually set.      TREATMENT PLAN: Pursue psychotherapy efforts and medications as indicated. Pharmacological and Non-Pharmacological treatment options discussed during today's visit. Patient/Guardian acknowledged and verbally consented with current treatment plan and was educated on the importance of compliance with treatment and follow-up appointments.      MEDICATION ISSUES:    Discussed medication options and treatment plan of prescribed medication as well as the risks, benefits, any black box warnings, and side effects including potential falls, possible impaired driving, and metabolic adversities among others. Patient is agreeable to call the office with any worsening of symptoms or onset of side effects, or if any concerns or questions arise.  The contact information for the office is made available to the patient. Patient is agreeable to call 911 or go to the nearest ER should they begin having any SI/HI, or if any urgent concerns arise. No medication side effects or related complaints today.     MEDS ORDERED DURING VISIT:  New Medications Ordered This Visit   Medications   • OLANZapine (zyPREXA) 20 MG tablet     Sig: Take 1 tablet by mouth Every Night.     Dispense:  30 tablet     Refill:  2   • hydrOXYzine (ATARAX) 25 MG tablet     Sig: Take 1 tablet by mouth 3 (Three) Times a Day As Needed for Anxiety.     Dispense:  90 tablet     Refill:  2   • busPIRone (BUSPAR) 30 MG tablet     Sig: Take 1 tablet by mouth 2 (Two) Times a Day. Monitor for serotonin syndrome as discussed     Dispense:  60 tablet     Refill:  2   • mirtazapine (REMERON) 45 MG tablet     Sig: Take 1 tablet by mouth Every Night. monitor for serotonin syndrome as discussed     Dispense:  30 tablet     Refill:  2   • prazosin (MINIPRESS) 5 MG capsule     Sig: Take 1 capsule by mouth every night at bedtime.     Dispense:  30 capsule     Refill:  2   • sertraline (ZOLOFT) 100 MG tablet     Sig:  Take 1 tablet by mouth Daily. Monitor for serotonin syndrome as discussed     Dispense:  30 tablet     Refill:  2   • traZODone (DESYREL) 50 MG tablet     Sig: Take 1 tablet by mouth Every Night. Monitor for serotonin syndrome as discussed     Dispense:  30 tablet     Refill:  2       Return in about 3 months (around 5/15/2021).       Progress toward goal: Not at goal    Functional Status: Mild impairment     Prognosis: Fair with Ongoing Treatment   Time spent 6026-1290 = 14 minutes      This document has been electronically signed by ROSALINE Cosby  February 15, 2021 10:00 EST    Part of this note may be an electronic transcription/translation of spoken language to printed text using the Dragon Dictation System.

## 2021-05-05 DIAGNOSIS — F32.0 CURRENT MILD EPISODE OF MAJOR DEPRESSIVE DISORDER, UNSPECIFIED WHETHER RECURRENT (HCC): ICD-10-CM

## 2021-05-05 DIAGNOSIS — F43.10 POST TRAUMATIC STRESS DISORDER (PTSD): ICD-10-CM

## 2021-05-05 DIAGNOSIS — F33.2 SEVERE EPISODE OF RECURRENT MAJOR DEPRESSIVE DISORDER, WITHOUT PSYCHOTIC FEATURES (HCC): ICD-10-CM

## 2021-05-05 DIAGNOSIS — F43.10 PTSD (POST-TRAUMATIC STRESS DISORDER): ICD-10-CM

## 2021-05-05 DIAGNOSIS — F41.1 GAD (GENERALIZED ANXIETY DISORDER): ICD-10-CM

## 2021-05-06 RX ORDER — OLANZAPINE 20 MG/1
20 TABLET ORAL NIGHTLY
Qty: 30 TABLET | Refills: 2 | OUTPATIENT
Start: 2021-05-06

## 2021-05-06 RX ORDER — OXCARBAZEPINE 600 MG/1
600 TABLET, FILM COATED ORAL EVERY 12 HOURS SCHEDULED
Qty: 60 TABLET | Refills: 2 | OUTPATIENT
Start: 2021-05-06

## 2021-05-06 RX ORDER — BUSPIRONE HYDROCHLORIDE 30 MG/1
30 TABLET ORAL 2 TIMES DAILY
Qty: 60 TABLET | Refills: 2 | OUTPATIENT
Start: 2021-05-06

## 2021-05-06 RX ORDER — HYDROXYZINE HYDROCHLORIDE 25 MG/1
25 TABLET, FILM COATED ORAL 3 TIMES DAILY PRN
Qty: 90 TABLET | Refills: 2 | OUTPATIENT
Start: 2021-05-06

## 2021-05-06 RX ORDER — SERTRALINE HYDROCHLORIDE 100 MG/1
100 TABLET, FILM COATED ORAL DAILY
Qty: 30 TABLET | Refills: 2 | OUTPATIENT
Start: 2021-05-06

## 2021-05-06 RX ORDER — PRAZOSIN HYDROCHLORIDE 5 MG/1
5 CAPSULE ORAL
Qty: 30 CAPSULE | Refills: 2 | OUTPATIENT
Start: 2021-05-06

## 2021-05-06 RX ORDER — TRAZODONE HYDROCHLORIDE 50 MG/1
50 TABLET ORAL NIGHTLY
Qty: 30 TABLET | Refills: 2 | OUTPATIENT
Start: 2021-05-06

## 2021-05-06 RX ORDER — MIRTAZAPINE 45 MG/1
45 TABLET, FILM COATED ORAL NIGHTLY
Qty: 30 TABLET | Refills: 2 | OUTPATIENT
Start: 2021-05-06

## 2021-05-06 NOTE — TELEPHONE ENCOUNTER
Patient was last seen by telehealth 2/15/21 and given 90 day supply. He has appt on 5/10/21 so should have enough meds until I see him

## 2021-05-10 ENCOUNTER — OFFICE VISIT (OUTPATIENT)
Dept: PSYCHIATRY | Facility: CLINIC | Age: 51
End: 2021-05-10

## 2021-05-10 VITALS
BODY MASS INDEX: 26.25 KG/M2 | SYSTOLIC BLOOD PRESSURE: 139 MMHG | HEIGHT: 69 IN | WEIGHT: 177.2 LBS | DIASTOLIC BLOOD PRESSURE: 90 MMHG | HEART RATE: 56 BPM

## 2021-05-10 DIAGNOSIS — F41.1 GAD (GENERALIZED ANXIETY DISORDER): ICD-10-CM

## 2021-05-10 DIAGNOSIS — F12.20 MARIJUANA DEPENDENCE (HCC): ICD-10-CM

## 2021-05-10 DIAGNOSIS — Z79.899 MEDICATION MANAGEMENT: ICD-10-CM

## 2021-05-10 DIAGNOSIS — F17.210 CIGARETTE NICOTINE DEPENDENCE WITHOUT COMPLICATION: ICD-10-CM

## 2021-05-10 DIAGNOSIS — F43.10 PTSD (POST-TRAUMATIC STRESS DISORDER): Primary | ICD-10-CM

## 2021-05-10 DIAGNOSIS — F33.41 MDD (MAJOR DEPRESSIVE DISORDER), RECURRENT, IN PARTIAL REMISSION (HCC): ICD-10-CM

## 2021-05-10 PROCEDURE — 99214 OFFICE O/P EST MOD 30 MIN: CPT | Performed by: NURSE PRACTITIONER

## 2021-05-10 RX ORDER — OLANZAPINE 20 MG/1
20 TABLET ORAL NIGHTLY
Qty: 30 TABLET | Refills: 3 | Status: SHIPPED | OUTPATIENT
Start: 2021-05-10 | End: 2021-08-10 | Stop reason: SDUPTHER

## 2021-05-10 RX ORDER — MIRTAZAPINE 45 MG/1
45 TABLET, FILM COATED ORAL NIGHTLY
Qty: 30 TABLET | Refills: 3 | Status: SHIPPED | OUTPATIENT
Start: 2021-05-10 | End: 2021-08-10 | Stop reason: SDUPTHER

## 2021-05-10 RX ORDER — OXCARBAZEPINE 600 MG/1
600 TABLET, FILM COATED ORAL EVERY 12 HOURS SCHEDULED
Qty: 60 TABLET | Refills: 3 | Status: SHIPPED | OUTPATIENT
Start: 2021-05-10 | End: 2021-08-10 | Stop reason: SDUPTHER

## 2021-05-10 RX ORDER — SERTRALINE HYDROCHLORIDE 100 MG/1
100 TABLET, FILM COATED ORAL DAILY
Qty: 30 TABLET | Refills: 3 | Status: SHIPPED | OUTPATIENT
Start: 2021-05-10 | End: 2021-08-10 | Stop reason: SDUPTHER

## 2021-05-10 RX ORDER — TRAZODONE HYDROCHLORIDE 50 MG/1
50 TABLET ORAL NIGHTLY
Qty: 30 TABLET | Refills: 3 | Status: SHIPPED | OUTPATIENT
Start: 2021-05-10 | End: 2021-08-10

## 2021-05-10 RX ORDER — BUSPIRONE HYDROCHLORIDE 30 MG/1
30 TABLET ORAL 2 TIMES DAILY
Qty: 60 TABLET | Refills: 3 | Status: SHIPPED | OUTPATIENT
Start: 2021-05-10 | End: 2021-08-10 | Stop reason: SDUPTHER

## 2021-05-10 RX ORDER — PRAZOSIN HYDROCHLORIDE 5 MG/1
5 CAPSULE ORAL
Qty: 30 CAPSULE | Refills: 3 | Status: SHIPPED | OUTPATIENT
Start: 2021-05-10 | End: 2021-08-10 | Stop reason: SDUPTHER

## 2021-08-10 ENCOUNTER — OFFICE VISIT (OUTPATIENT)
Dept: PSYCHIATRY | Facility: CLINIC | Age: 51
End: 2021-08-10

## 2021-08-10 VITALS
SYSTOLIC BLOOD PRESSURE: 176 MMHG | WEIGHT: 177.2 LBS | DIASTOLIC BLOOD PRESSURE: 95 MMHG | HEART RATE: 57 BPM | HEIGHT: 69 IN | BODY MASS INDEX: 26.25 KG/M2

## 2021-08-10 DIAGNOSIS — F43.10 PTSD (POST-TRAUMATIC STRESS DISORDER): Primary | ICD-10-CM

## 2021-08-10 DIAGNOSIS — F33.41 MDD (MAJOR DEPRESSIVE DISORDER), RECURRENT, IN PARTIAL REMISSION (HCC): ICD-10-CM

## 2021-08-10 DIAGNOSIS — F12.20 MARIJUANA DEPENDENCE (HCC): ICD-10-CM

## 2021-08-10 DIAGNOSIS — F17.210 CIGARETTE NICOTINE DEPENDENCE WITHOUT COMPLICATION: ICD-10-CM

## 2021-08-10 DIAGNOSIS — F41.1 GAD (GENERALIZED ANXIETY DISORDER): ICD-10-CM

## 2021-08-10 DIAGNOSIS — Z79.899 MEDICATION MANAGEMENT: ICD-10-CM

## 2021-08-10 PROCEDURE — 99214 OFFICE O/P EST MOD 30 MIN: CPT | Performed by: NURSE PRACTITIONER

## 2021-08-10 RX ORDER — OXCARBAZEPINE 600 MG/1
600 TABLET, FILM COATED ORAL EVERY 12 HOURS SCHEDULED
Qty: 60 TABLET | Refills: 3 | Status: SHIPPED | OUTPATIENT
Start: 2021-08-10 | End: 2021-11-11

## 2021-08-10 RX ORDER — BUSPIRONE HYDROCHLORIDE 30 MG/1
30 TABLET ORAL 2 TIMES DAILY
Qty: 60 TABLET | Refills: 3 | Status: SHIPPED | OUTPATIENT
Start: 2021-08-10 | End: 2021-11-11 | Stop reason: SDUPTHER

## 2021-08-10 RX ORDER — MIRTAZAPINE 45 MG/1
45 TABLET, FILM COATED ORAL NIGHTLY
Qty: 30 TABLET | Refills: 3 | Status: SHIPPED | OUTPATIENT
Start: 2021-08-10 | End: 2021-11-11 | Stop reason: SDUPTHER

## 2021-08-10 RX ORDER — PRAZOSIN HYDROCHLORIDE 5 MG/1
5 CAPSULE ORAL
Qty: 30 CAPSULE | Refills: 3 | Status: SHIPPED | OUTPATIENT
Start: 2021-08-10 | End: 2021-11-11 | Stop reason: SDUPTHER

## 2021-08-10 RX ORDER — OLANZAPINE 20 MG/1
20 TABLET ORAL NIGHTLY
Qty: 30 TABLET | Refills: 3 | Status: SHIPPED | OUTPATIENT
Start: 2021-08-10 | End: 2021-11-11 | Stop reason: SDUPTHER

## 2021-08-10 RX ORDER — SERTRALINE HYDROCHLORIDE 100 MG/1
100 TABLET, FILM COATED ORAL DAILY
Qty: 30 TABLET | Refills: 3 | Status: SHIPPED | OUTPATIENT
Start: 2021-08-10 | End: 2021-12-10

## 2021-08-10 NOTE — PROGRESS NOTES
Subjective   Deni Lopez is a 51 y.o. male who presents today for his 3-month follow up appointment for medication management    Chief Complaint:  Follow up panic symptoms, anxiety, depression    History of Present Illness: Deni reports things are about the same.  Sleep is good with average duration 7 hours.  Prazosin 5 mg at night effectively treats nightmares.  Appetite also good.  Weight stable per EMR.  Rates anxiety as 5/10, depression as 4-5/10 on 0-10 scale with 10 being the worst.  Denies SI/HI/AVH.  Continues to smoke 2 joints every night.  Denies other substance use.  Reports he got a new rifle and plans to go deer hunting soon.    The following portions of the patient's history were reviewed and updated as appropriate: allergies, current medications, past family history, past medical history, past social history, past surgical history and problem list.    Past Medical History:  Past Medical History:   Diagnosis Date   • Anxiety    • Depression    • GERD (gastroesophageal reflux disease)    • Hepatitis C    • Hypertension    • Seizure (CMS/[facility])      Social History:  Social History     Socioeconomic History   • Marital status:      Spouse name: Not on file   • Number of children: Not on file   • Years of education: Not on file   • Highest education level: Not on file   Tobacco Use   • Smoking status: Current Every Day Smoker     Packs/day: 1.00     Years: 20.00     Pack years: 20.00     Types: Cigarettes   • Smokeless tobacco: Never Used   Vaping Use   • Vaping Use: Never used   Substance and Sexual Activity   • Alcohol use: No   • Drug use: No   • Sexual activity: Yes     Partners: Female     Family History:  Family History   Problem Relation Age of Onset   • Drug abuse Father      Past Surgical History:  History reviewed. No pertinent surgical history.    Problem List:  Patient Active Problem List   Diagnosis   • Marijuana dependence (CMS/HCC)     Allergy:   Allergies   Allergen Reactions   •  "Codeine Sulfate Hives   • Penicillins Hives      Current Medications:   Current Outpatient Medications   Medication Sig Dispense Refill   • amLODIPine (NORVASC) 10 MG tablet      • busPIRone (BUSPAR) 30 MG tablet Take 1 tablet by mouth 2 (Two) Times a Day. Monitor for serotonin syndrome as discussed 60 tablet 3   • ibuprofen (ADVIL,MOTRIN) 600 MG tablet   0   • lisinopril (PRINIVIL,ZESTRIL) 40 MG tablet      • metoprolol tartrate (LOPRESSOR) 100 MG tablet      • mirtazapine (REMERON) 45 MG tablet Take 1 tablet by mouth Every Night. monitor for serotonin syndrome as discussed 30 tablet 3   • OLANZapine (zyPREXA) 20 MG tablet Take 1 tablet by mouth Every Night. 30 tablet 3   • OXcarbazepine (TRILEPTAL) 600 MG tablet Take 1 tablet by mouth Every 12 (Twelve) Hours. 60 tablet 3   • pantoprazole (PROTONIX) 40 MG EC tablet Daily.     • prazosin (MINIPRESS) 5 MG capsule Take 1 capsule by mouth every night at bedtime. 30 capsule 3   • sertraline (ZOLOFT) 100 MG tablet Take 1 tablet by mouth Daily. Monitor for serotonin syndrome as discussed 30 tablet 3   • traZODone (DESYREL) 50 MG tablet Take 1 tablet by mouth Every Night. Monitor for serotonin syndrome as discussed 30 tablet 3     No current facility-administered medications for this visit.     Review of Symptoms:    Review of Systems   Gastrointestinal: Positive for GERD.   Neurological: Positive for memory problem.   All other systems reviewed and are negative.    Physical Exam:   Blood pressure 176/95, pulse 57, height 175.4 cm (69.06\"), weight 80.4 kg (177 lb 3.2 oz).  Body mass index is 26.13 kg/m².    Appearance: Deni is a 50-year-old  male.  He appears clean, casually dressed.  BMI 26.13  Gait, Station, Strength: Posture upright, gait steady.  No indication of impairment, acute distress, abnormal muscle movements, or tremors    Mental Status Exam:   Hygiene:   good  Cooperation:  Cooperative  Eye Contact:  Good  Psychomotor Behavior:  Slow  Affect:  " Appropriate  Mood: normal  Hopelessness: Denies  Speech:  slow  Thought Process:  Goal directed  Thought Content:  Normal  Suicidal:  None  Homicidal:  None  Hallucinations:  None  Delusion:  None  Memory:  Deficits  Orientation:  Person, Place, Time and Situation  Reliability:  good  Insight:  Fair  Judgement:  Fair  Impulse Control:  Fair  Physical/Medical Issues:  Yes HTN     PHQ-Score Total:  .Patient screened positive for depression based on a PHQ-9 score of 0 on 5/10/2021. Follow-up recommendations include: Prescribed antidepressant medication treatment.    Assessment/Plan   Diagnoses and all orders for this visit:    1. PTSD (post-traumatic stress disorder) (Primary)  -     busPIRone (BUSPAR) 30 MG tablet; Take 1 tablet by mouth 2 (Two) Times a Day. Monitor for serotonin syndrome as discussed  Dispense: 60 tablet; Refill: 3  -     mirtazapine (REMERON) 45 MG tablet; Take 1 tablet by mouth Every Night. monitor for serotonin syndrome as discussed  Dispense: 30 tablet; Refill: 3  -     OLANZapine (zyPREXA) 20 MG tablet; Take 1 tablet by mouth Every Night.  Dispense: 30 tablet; Refill: 3  -     OXcarbazepine (TRILEPTAL) 600 MG tablet; Take 1 tablet by mouth Every 12 (Twelve) Hours.  Dispense: 60 tablet; Refill: 3  -     prazosin (MINIPRESS) 5 MG capsule; Take 1 capsule by mouth every night at bedtime.  Dispense: 30 capsule; Refill: 3  -     sertraline (ZOLOFT) 100 MG tablet; Take 1 tablet by mouth Daily. Monitor for serotonin syndrome as discussed  Dispense: 30 tablet; Refill: 3    2. VANDANA (generalized anxiety disorder)  -     busPIRone (BUSPAR) 30 MG tablet; Take 1 tablet by mouth 2 (Two) Times a Day. Monitor for serotonin syndrome as discussed  Dispense: 60 tablet; Refill: 3  -     mirtazapine (REMERON) 45 MG tablet; Take 1 tablet by mouth Every Night. monitor for serotonin syndrome as discussed  Dispense: 30 tablet; Refill: 3  -     sertraline (ZOLOFT) 100 MG tablet; Take 1 tablet by mouth Daily. Monitor for  serotonin syndrome as discussed  Dispense: 30 tablet; Refill: 3    3. MDD (major depressive disorder), recurrent, in partial remission (CMS/[facility])  -     busPIRone (BUSPAR) 30 MG tablet; Take 1 tablet by mouth 2 (Two) Times a Day. Monitor for serotonin syndrome as discussed  Dispense: 60 tablet; Refill: 3  -     mirtazapine (REMERON) 45 MG tablet; Take 1 tablet by mouth Every Night. monitor for serotonin syndrome as discussed  Dispense: 30 tablet; Refill: 3  -     OLANZapine (zyPREXA) 20 MG tablet; Take 1 tablet by mouth Every Night.  Dispense: 30 tablet; Refill: 3  -     OXcarbazepine (TRILEPTAL) 600 MG tablet; Take 1 tablet by mouth Every 12 (Twelve) Hours.  Dispense: 60 tablet; Refill: 3  -     sertraline (ZOLOFT) 100 MG tablet; Take 1 tablet by mouth Daily. Monitor for serotonin syndrome as discussed  Dispense: 30 tablet; Refill: 3    4. Marijuana dependence (CMS/[facility])    5. Cigarette nicotine dependence without complication    6. Medication management    Patient's symptoms of PTSD,  anxiety, depression and  insomnia appear stabilized with current medication regimen.  Patient takes medications as prescribed and denies medication side effects. Long term effects of marijuana use discussed such as poor memory, impaired cognition, psychosis, falls, and oversedation,especially when used with opioids.  Patient again cautioned regarding risk of serotonin syndrome with use of multiple serotonergic medications and is agreeable to discontinuing trazodone.  Agrees to decrease  Dose to 25 mg x 1 week and then discontinue.  He finds his medication regimen effective and prefers no other changes be made at this time.    -Taper and Discontinue trazodone -increase dose to 25 mg x 7 days then discontinue  -Continue Zoloft 100 mg daily for symptoms of anxiety, depression, PTSD  -Continue prazosin 5 mg at bedtime for PTSD related nightmares  -Continue Trileptal 600 mg twice daily for mood stabilization  -Continue Zyprexa 20 mg at  bedtime for symptoms of anxiety, depression, PTSD  -Continue mirtazapine 45 mg at night for symptoms of anxiety, depression, PTSD  -Continue BuSpar 30 mg p.o. twice daily for symptoms of anxiety, depression  -Patient strongly advised to avoid use of marijuana or other illicit substances use of psychotropic medications  -.Deni is encouraged to quit smoking. The nature of nicotine addiction is discussed and the adverse health conditions related to smoking are reviewed.He is aware various alternatives are available to help and is not interested in smoking cessation at this time.      Visit Diagnoses:    ICD-10-CM ICD-9-CM   1. PTSD (post-traumatic stress disorder)  F43.10 309.81   2. VANDANA (generalized anxiety disorder)  F41.1 300.02   3. MDD (major depressive disorder), recurrent, in partial remission (CMS/[facility])  F33.41 296.35   4. Marijuana dependence (CMS/[facility])  F12.20 304.30   5. Cigarette nicotine dependence without complication  F17.210 305.1   6. Medication management  Z79.899 V58.69     TREATMENT PLAN/GOALS:  Short Term  1. Pt will keep all appointments for med mgt   2. Pt will take medications as prescribed and report intolerable SE  3. Patient will stop using THC    Long term:   1. Pt will exhibit emotional stability  2. Pt will use coping skill to work through depression    MEDICATION ISSUES:  Discussed medication options and treatment plan of prescribed medication as well as the risks, benefits, and side effects including potential falls, possible impaired driving and metabolic adversities among others. Patient is agreeable to call the office with any worsening of symptoms or onset of side effects. Patient is agreeable to call 911 or go to the nearest ER should he/she begin having SI/HI.     MEDS ORDERED DURING VISIT:  New Medications Ordered This Visit   Medications   • busPIRone (BUSPAR) 30 MG tablet     Sig: Take 1 tablet by mouth 2 (Two) Times a Day. Monitor for serotonin syndrome as discussed     Dispense:   60 tablet     Refill:  3   • mirtazapine (REMERON) 45 MG tablet     Sig: Take 1 tablet by mouth Every Night. monitor for serotonin syndrome as discussed     Dispense:  30 tablet     Refill:  3   • OLANZapine (zyPREXA) 20 MG tablet     Sig: Take 1 tablet by mouth Every Night.     Dispense:  30 tablet     Refill:  3   • OXcarbazepine (TRILEPTAL) 600 MG tablet     Sig: Take 1 tablet by mouth Every 12 (Twelve) Hours.     Dispense:  60 tablet     Refill:  3   • prazosin (MINIPRESS) 5 MG capsule     Sig: Take 1 capsule by mouth every night at bedtime.     Dispense:  30 capsule     Refill:  3   • sertraline (ZOLOFT) 100 MG tablet     Sig: Take 1 tablet by mouth Daily. Monitor for serotonin syndrome as discussed     Dispense:  30 tablet     Refill:  3     Return in about 3 months (around 11/10/2021).       Prognosis: Guarded dependent on medication/follow up and treatment plan compliance.      This document has been electronically signed by ROSALINE Cosby   August 10, 2021 10:01 EDT    Part of this note may be an electronic transcription/translation of spoken language to printed text using the Dragon Dictation System.

## 2021-11-11 ENCOUNTER — OFFICE VISIT (OUTPATIENT)
Dept: PSYCHIATRY | Facility: CLINIC | Age: 51
End: 2021-11-11

## 2021-11-11 VITALS
BODY MASS INDEX: 25.62 KG/M2 | WEIGHT: 173 LBS | HEIGHT: 69 IN | SYSTOLIC BLOOD PRESSURE: 150 MMHG | DIASTOLIC BLOOD PRESSURE: 90 MMHG | HEART RATE: 51 BPM

## 2021-11-11 DIAGNOSIS — F33.42 MDD (MAJOR DEPRESSIVE DISORDER), RECURRENT, IN FULL REMISSION (HCC): ICD-10-CM

## 2021-11-11 DIAGNOSIS — F41.1 GAD (GENERALIZED ANXIETY DISORDER): Primary | ICD-10-CM

## 2021-11-11 DIAGNOSIS — F12.20 MARIJUANA DEPENDENCE (HCC): ICD-10-CM

## 2021-11-11 DIAGNOSIS — F43.10 PTSD (POST-TRAUMATIC STRESS DISORDER): ICD-10-CM

## 2021-11-11 DIAGNOSIS — F43.12 CHRONIC POST-TRAUMATIC STRESS DISORDER (PTSD): ICD-10-CM

## 2021-11-11 DIAGNOSIS — Z79.899 MEDICATION MANAGEMENT: ICD-10-CM

## 2021-11-11 PROCEDURE — 99214 OFFICE O/P EST MOD 30 MIN: CPT | Performed by: NURSE PRACTITIONER

## 2021-11-11 RX ORDER — ESLICARBAZEPINE ACETATE 800 MG/1
1 TABLET ORAL DAILY
COMMUNITY
Start: 2021-11-08

## 2021-11-11 RX ORDER — PRAZOSIN HYDROCHLORIDE 5 MG/1
5 CAPSULE ORAL
Qty: 30 CAPSULE | Refills: 1 | Status: SHIPPED | OUTPATIENT
Start: 2021-11-11 | End: 2022-01-27 | Stop reason: SDUPTHER

## 2021-11-11 RX ORDER — BUSPIRONE HYDROCHLORIDE 30 MG/1
30 TABLET ORAL 2 TIMES DAILY
Qty: 60 TABLET | Refills: 1 | Status: SHIPPED | OUTPATIENT
Start: 2021-11-11 | End: 2022-01-27 | Stop reason: SDUPTHER

## 2021-11-11 RX ORDER — OLANZAPINE 20 MG/1
20 TABLET ORAL NIGHTLY
Qty: 30 TABLET | Refills: 1 | Status: SHIPPED | OUTPATIENT
Start: 2021-11-11 | End: 2022-01-27 | Stop reason: SDUPTHER

## 2021-11-11 RX ORDER — MIRTAZAPINE 45 MG/1
45 TABLET, FILM COATED ORAL NIGHTLY
Qty: 30 TABLET | Refills: 1 | Status: SHIPPED | OUTPATIENT
Start: 2021-11-11 | End: 2022-03-01 | Stop reason: SDUPTHER

## 2021-11-11 NOTE — PROGRESS NOTES
"Subjective   Deni Lopez is a 51 y.o. male who presents today for his follow up appointment for medication management    Chief Complaint: Anxiety, anergia    History of Present Illness: \"Everything seems to be going alright\". States he is not having a lot of depression, just anxiety that fluctuates situationally.  PHQ 0. Anxiety rated as 4/10 with 10 being the worst. States he  worries about \"everything\", especially his dog.  Sleep quality is good. Appetite \"OK\". Complains of not have any energy for the last 5-6 months. States he is \"In the  process\" of getting work up with his PCP.   started him on a  new seizure medication, Aptiom  1 month ago. Diagnosed with adult onset epilepsy 2-3 yrs ago. Unable to drive due to having seizure 6 mos ago. Smokes marijuana every am and pm. Plans to go deer hunting this weekend with relative and friends.     The following portions of the patient's history were reviewed and updated as appropriate: allergies, current medications, past family history, past medical history, past social history, past surgical history and problem list.    Past Medical History:  Past Medical History:   Diagnosis Date   • Anxiety    • Depression    • GERD (gastroesophageal reflux disease)    • Hepatitis C    • Hypertension    • Seizure (HCC)      Social History:  Social History     Socioeconomic History   • Marital status:    Tobacco Use   • Smoking status: Current Every Day Smoker     Packs/day: 1.00     Years: 20.00     Pack years: 20.00     Types: Cigarettes   • Smokeless tobacco: Never Used   Vaping Use   • Vaping Use: Never used   Substance and Sexual Activity   • Alcohol use: No   • Drug use: No   • Sexual activity: Yes     Partners: Female     Family History:  Family History   Problem Relation Age of Onset   • Drug abuse Father      Past Surgical History:  No past surgical history on file.    Problem List:  Patient Active Problem List   Diagnosis   • Marijuana dependence (HCC) " "    Allergy:   Allergies   Allergen Reactions   • Codeine Sulfate Hives   • Penicillins Hives      Current Medications:   Current Outpatient Medications   Medication Sig Dispense Refill   • amLODIPine (NORVASC) 10 MG tablet      • Aptiom 800 MG tablet tablet Take 1 tablet by mouth Daily.     • busPIRone (BUSPAR) 30 MG tablet Take 1 tablet by mouth 2 (Two) Times a Day. Monitor for serotonin syndrome as discussed 60 tablet 3   • ibuprofen (ADVIL,MOTRIN) 600 MG tablet   0   • lisinopril (PRINIVIL,ZESTRIL) 40 MG tablet      • metoprolol tartrate (LOPRESSOR) 100 MG tablet      • mirtazapine (REMERON) 45 MG tablet Take 1 tablet by mouth Every Night. monitor for serotonin syndrome as discussed 30 tablet 3   • OLANZapine (zyPREXA) 20 MG tablet Take 1 tablet by mouth Every Night. 30 tablet 3   • pantoprazole (PROTONIX) 40 MG EC tablet Daily.     • prazosin (MINIPRESS) 5 MG capsule Take 1 capsule by mouth every night at bedtime. 30 capsule 3   • sertraline (ZOLOFT) 100 MG tablet Take 1 tablet by mouth Daily. Monitor for serotonin syndrome as discussed 30 tablet 3     No current facility-administered medications for this visit.     Review of Symptoms:    Review of Systems   Constitutional: Positive for fatigue.   Gastrointestinal: Positive for GERD.   Neurological: Positive for seizures and memory problem.   Psychiatric/Behavioral: Positive for sleep disturbance and stress. Negative for suicidal ideas. The patient is nervous/anxious.    All other systems reviewed and are negative.    Physical Exam:   Blood pressure 150/90, pulse 51, height 175.4 cm (69.06\"), weight 78.5 kg (173 lb).  Body mass index is 25.51 kg/m².    Appearance: Deni is a 51 year-old  male.  He appears clean, casually dressed.  BMI 25.51    Gait, Station, Strength: Posture upright, gait steady.  No indication of impairment, acute distress, abnormal muscle movements, or tremors    Mental Status Exam:   Hygiene:   good  Cooperation:  Cooperative  Eye " Contact:  Good  Psychomotor Behavior:  Slow  Affect:  Appropriate  Mood: normal  Hopelessness: Denies  Speech:  slow  Thought Process:  Goal directed  Thought Content:  Normal  Suicidal:  None  Homicidal:  None  Hallucinations:  None  Delusion:  None  Memory:  Deficits  Orientation:  Person, Place, Time and Situation  Reliability:  good  Insight:  Fair  Judgement:  Fair  Impulse Control:  Fair  Physical/Medical Issues:  Yes HTN     PHQ-Score Total:  .Patient screened positive for depression based on a PHQ-9 score of 0 on 11/11/2021. Follow-up recommendations include: refilled antidepressant.    Assessment/Plan   Diagnoses and all orders for this visit:    1. VANDANA (generalized anxiety disorder) (Primary)  -     busPIRone (BUSPAR) 30 MG tablet; Take 1 tablet by mouth 2 (Two) Times a Day. Monitor for serotonin syndrome as discussed  Dispense: 60 tablet; Refill: 1  -     mirtazapine (REMERON) 45 MG tablet; Take 1 tablet by mouth Every Night. monitor for serotonin syndrome as discussed  Dispense: 30 tablet; Refill: 1    2. Chronic post-traumatic stress disorder (PTSD)  -     busPIRone (BUSPAR) 30 MG tablet; Take 1 tablet by mouth 2 (Two) Times a Day. Monitor for serotonin syndrome as discussed  Dispense: 60 tablet; Refill: 1  -     mirtazapine (REMERON) 45 MG tablet; Take 1 tablet by mouth Every Night. monitor for serotonin syndrome as discussed  Dispense: 30 tablet; Refill: 1  -     OLANZapine (zyPREXA) 20 MG tablet; Take 1 tablet by mouth Every Night.  Dispense: 30 tablet; Refill: 1    3. MDD (major depressive disorder), recurrent, in full remission (HCC)  -     busPIRone (BUSPAR) 30 MG tablet; Take 1 tablet by mouth 2 (Two) Times a Day. Monitor for serotonin syndrome as discussed  Dispense: 60 tablet; Refill: 1  -     mirtazapine (REMERON) 45 MG tablet; Take 1 tablet by mouth Every Night. monitor for serotonin syndrome as discussed  Dispense: 30 tablet; Refill: 1  -     OLANZapine (zyPREXA) 20 MG tablet; Take 1 tablet  by mouth Every Night.  Dispense: 30 tablet; Refill: 1    4. Marijuana dependence (HCC)    5. Medication management    Patient's symptoms of PTSD,  anxiety, depression and  insomnia appear stabilized with current medication regimen.  He states he takes medications as prescribed and denies medication side effects. Long term effects of marijuana use discussed such as poor memory, impaired cognition, psychosis, falls, and oversedation,especially when used with opioids.He finds his medication regimen effective and prefers no other changes be made at this time. Provider offered to place order for labs. He declines and states he will have PCP order them.      -Continue Zoloft 100 mg daily for symptoms of anxiety, depression, PTSD  -Continue prazosin 5 mg at bedtime for PTSD related nightmares  -Continue Zyprexa 20 mg at bedtime for symptoms of anxiety, depression, PTSD  -Continue mirtazapine 45 mg at night for symptoms of anxiety, depression, PTSD  -Continue BuSpar 30 mg p.o. twice daily for symptoms of anxiety, depression  -Patient strongly advised to avoid use of marijuana or other illicit substances use of psychotropic medications  -.Deni is encouraged to quit smoking. The nature of nicotine addiction is discussed and the adverse health conditions related to smoking are reviewed.He is aware various alternatives are available to help and is not interested in smoking cessation at this time.      Visit Diagnoses:    ICD-10-CM ICD-9-CM   1. VANDANA (generalized anxiety disorder)  F41.1 300.02   2. Chronic post-traumatic stress disorder (PTSD)  F43.12 309.81   3. MDD (major depressive disorder), recurrent, in full remission (HCC)  F33.42 296.36   4. Marijuana dependence (HCC)  F12.20 304.30   5. Medication management  Z79.899 V58.69   6. PTSD (post-traumatic stress disorder)  F43.10 309.81     TREATMENT PLAN/GOALS:  Short Term  1. Pt will keep all appointments for med mgt   2. Pt will take medications as prescribed and report  intolerable SE  3. Patient will stop using THC    Long term:   1. Pt will exhibit emotional stability  2. Pt will use coping skill to work through depression    MEDICATION ISSUES:  Discussed medication options and treatment plan of prescribed medication as well as the risks, benefits, and side effects including potential falls, possible impaired driving and metabolic adversities among others. Patient is agreeable to call the office with any worsening of symptoms or onset of side effects. Patient is agreeable to call 911 or go to the nearest ER should he  begin having SI/HI.     MEDS ORDERED DURING VISIT:  New Medications Ordered This Visit   Medications   • busPIRone (BUSPAR) 30 MG tablet     Sig: Take 1 tablet by mouth 2 (Two) Times a Day. Monitor for serotonin syndrome as discussed     Dispense:  60 tablet     Refill:  1   • mirtazapine (REMERON) 45 MG tablet     Sig: Take 1 tablet by mouth Every Night. monitor for serotonin syndrome as discussed     Dispense:  30 tablet     Refill:  1   • OLANZapine (zyPREXA) 20 MG tablet     Sig: Take 1 tablet by mouth Every Night.     Dispense:  30 tablet     Refill:  1   • prazosin (MINIPRESS) 5 MG capsule     Sig: Take 1 capsule by mouth every night at bedtime.     Dispense:  30 capsule     Refill:  1     Return in about 3 months (around 2/11/2022).       Prognosis: Guarded dependent on medication/follow up and treatment plan compliance.      This document has been electronically signed by ROSALINE Cosby   November 11, 2021 10:53 EST    Part of this note may be an electronic transcription/translation of spoken language to printed text using the Dragon Dictation System.   117

## 2021-12-09 DIAGNOSIS — F41.1 GAD (GENERALIZED ANXIETY DISORDER): ICD-10-CM

## 2021-12-09 DIAGNOSIS — F43.10 PTSD (POST-TRAUMATIC STRESS DISORDER): ICD-10-CM

## 2021-12-09 DIAGNOSIS — F33.41 MDD (MAJOR DEPRESSIVE DISORDER), RECURRENT, IN PARTIAL REMISSION (HCC): ICD-10-CM

## 2021-12-10 RX ORDER — SERTRALINE HYDROCHLORIDE 100 MG/1
TABLET, FILM COATED ORAL
Qty: 30 TABLET | Refills: 1 | Status: SHIPPED | OUTPATIENT
Start: 2021-12-10 | End: 2022-01-27 | Stop reason: SDUPTHER

## 2021-12-27 DIAGNOSIS — F33.42 MDD (MAJOR DEPRESSIVE DISORDER), RECURRENT, IN FULL REMISSION (HCC): ICD-10-CM

## 2021-12-27 DIAGNOSIS — F43.12 CHRONIC POST-TRAUMATIC STRESS DISORDER (PTSD): ICD-10-CM

## 2021-12-27 DIAGNOSIS — F41.1 GAD (GENERALIZED ANXIETY DISORDER): ICD-10-CM

## 2021-12-27 RX ORDER — BUSPIRONE HYDROCHLORIDE 30 MG/1
TABLET ORAL
Qty: 60 TABLET | Refills: 1 | OUTPATIENT
Start: 2021-12-27

## 2022-01-27 ENCOUNTER — OFFICE VISIT (OUTPATIENT)
Dept: PSYCHIATRY | Facility: CLINIC | Age: 52
End: 2022-01-27

## 2022-01-27 VITALS
BODY MASS INDEX: 25.77 KG/M2 | WEIGHT: 174 LBS | HEART RATE: 75 BPM | SYSTOLIC BLOOD PRESSURE: 130 MMHG | DIASTOLIC BLOOD PRESSURE: 82 MMHG | HEIGHT: 69 IN

## 2022-01-27 DIAGNOSIS — F43.12 CHRONIC POST-TRAUMATIC STRESS DISORDER (PTSD): ICD-10-CM

## 2022-01-27 DIAGNOSIS — Z79.899 MEDICATION MANAGEMENT: ICD-10-CM

## 2022-01-27 DIAGNOSIS — F12.20 MARIJUANA DEPENDENCE: ICD-10-CM

## 2022-01-27 DIAGNOSIS — F41.1 GAD (GENERALIZED ANXIETY DISORDER): Primary | ICD-10-CM

## 2022-01-27 DIAGNOSIS — F33.0 MDD (MAJOR DEPRESSIVE DISORDER), RECURRENT EPISODE, MILD: ICD-10-CM

## 2022-01-27 PROCEDURE — 99214 OFFICE O/P EST MOD 30 MIN: CPT | Performed by: NURSE PRACTITIONER

## 2022-01-27 RX ORDER — BUSPIRONE HYDROCHLORIDE 30 MG/1
30 TABLET ORAL 2 TIMES DAILY
Qty: 60 TABLET | Refills: 1 | Status: SHIPPED | OUTPATIENT
Start: 2022-01-27 | End: 2022-03-30 | Stop reason: SDUPTHER

## 2022-01-27 RX ORDER — POTASSIUM CHLORIDE 750 MG/1
10 TABLET, FILM COATED, EXTENDED RELEASE ORAL DAILY
COMMUNITY
Start: 2022-01-24

## 2022-01-27 RX ORDER — OLANZAPINE 20 MG/1
20 TABLET ORAL NIGHTLY
Qty: 30 TABLET | Refills: 1 | Status: SHIPPED | OUTPATIENT
Start: 2022-01-27 | End: 2022-03-30 | Stop reason: SDUPTHER

## 2022-01-27 RX ORDER — SERTRALINE HYDROCHLORIDE 100 MG/1
100 TABLET, FILM COATED ORAL DAILY
Qty: 30 TABLET | Refills: 1 | Status: SHIPPED | OUTPATIENT
Start: 2022-01-27 | End: 2022-03-30 | Stop reason: SDUPTHER

## 2022-01-27 RX ORDER — PRAZOSIN HYDROCHLORIDE 5 MG/1
5 CAPSULE ORAL
Qty: 30 CAPSULE | Refills: 1 | Status: SHIPPED | OUTPATIENT
Start: 2022-01-27 | End: 2022-03-30 | Stop reason: SDUPTHER

## 2022-01-27 NOTE — PROGRESS NOTES
"Subjective   Deni Lopez is a 51 y.o. male who presents today for his follow up appointment for medication management    Chief Complaint: Anxiety, anergia    History of Present Illness: Patient states he is \"doing alright\" and \"feels pretty good\". Sleep quality is good. Average sleep duration 8-9 hours. Prazosin is effective for nightmares. Denies problems with appetite. Weight is stable. States he has not taken Buspar for 1 month because pharmacy told him he had no refills.  EMR indicates patient should have had refill available at pharmacy. States he notices significant increase in anxiety without them. Rates anxiety as 8/10 and  Depression 4/10 with 10 being the worst.  Denies SI/HI/AVH.    The following portions of the patient's history were reviewed and updated as appropriate: allergies, current medications, past family history, past medical history, past social history, past surgical history and problem list.    Past Medical History:  Past Medical History:   Diagnosis Date   • Anxiety    • Depression    • GERD (gastroesophageal reflux disease)    • Hepatitis C    • Hypertension    • Seizure (HCC)      Social History:  Social History     Socioeconomic History   • Marital status:    Tobacco Use   • Smoking status: Current Every Day Smoker     Packs/day: 1.00     Years: 20.00     Pack years: 20.00     Types: Cigarettes   • Smokeless tobacco: Never Used   Vaping Use   • Vaping Use: Never used   Substance and Sexual Activity   • Alcohol use: No   • Drug use: No   • Sexual activity: Yes     Partners: Female     Family History:  Family History   Problem Relation Age of Onset   • Drug abuse Father      Past Surgical History:  History reviewed. No pertinent surgical history.    Problem List:  Patient Active Problem List   Diagnosis   • Marijuana dependence (HCC)     Allergy:   Allergies   Allergen Reactions   • Codeine Sulfate Hives   • Penicillins Hives      Current Medications:   Current Outpatient " "Medications   Medication Sig Dispense Refill   • amLODIPine (NORVASC) 10 MG tablet      • Aptiom 800 MG tablet tablet Take 1 tablet by mouth Daily.     • busPIRone (BUSPAR) 30 MG tablet Take 1 tablet by mouth 2 (Two) Times a Day. Monitor for serotonin syndrome as discussed 60 tablet 1   • ibuprofen (ADVIL,MOTRIN) 600 MG tablet   0   • lisinopril (PRINIVIL,ZESTRIL) 40 MG tablet      • metoprolol tartrate (LOPRESSOR) 100 MG tablet      • mirtazapine (REMERON) 45 MG tablet Take 1 tablet by mouth Every Night. monitor for serotonin syndrome as discussed 30 tablet 1   • OLANZapine (zyPREXA) 20 MG tablet Take 1 tablet by mouth Every Night. 30 tablet 1   • pantoprazole (PROTONIX) 40 MG EC tablet Daily.     • potassium chloride 10 MEQ CR tablet Take 10 mEq by mouth Daily.     • prazosin (MINIPRESS) 5 MG capsule Take 1 capsule by mouth every night at bedtime. 30 capsule 1   • sertraline (ZOLOFT) 100 MG tablet TAKE ONE TABLET BY MOUTH EVERY DAY 30 tablet 1     No current facility-administered medications for this visit.     Review of Symptoms:    Review of Systems   Constitutional: Positive for fatigue.   Gastrointestinal: Positive for GERD.   Neurological: Positive for seizures and memory problem.   Psychiatric/Behavioral: Positive for sleep disturbance and stress. Negative for suicidal ideas. The patient is nervous/anxious.    All other systems reviewed and are negative.    Physical Exam:   Blood pressure 130/82, pulse 75, height 175.3 cm (69\"), weight 78.9 kg (174 lb).  Body mass index is 25.7 kg/m².    Appearance: Deni is a 51 year-old  male.  He appears clean, casually dressed.  BMI 25.70    Gait, Station, Strength: Posture upright, gait steady.  No indication of impairment, acute distress, abnormal muscle movements, or tremors    Mental Status Exam:   Hygiene:   good  Cooperation:  Cooperative  Eye Contact:  Good  Psychomotor Behavior:  Slow  Affect:  Appropriate  Mood: normal  Hopelessness: Denies  Speech:  " slow  Thought Process:  Goal directed  Thought Content:  Normal  Suicidal:  None  Homicidal:  None  Hallucinations:  None  Delusion:  None  Memory:  Deficits  Orientation:  Person, Place, Time and Situation  Reliability:  good  Insight:  Fair  Judgement:  Fair  Impulse Control:  Fair  Physical/Medical Issues:  Yes HTN     PHQ-Score Total:  .Patient screened positive for depression based on a PHQ-9 score of 0 on 11/11/2021. Follow-up recommendations include: refilled antidepressant.    Assessment/Plan   Diagnoses and all orders for this visit:    1. VANDANA (generalized anxiety disorder) (Primary)  -     busPIRone (BUSPAR) 30 MG tablet; Take 1 tablet by mouth 2 (Two) Times a Day. Monitor for serotonin syndrome as discussed  Dispense: 60 tablet; Refill: 1  -     sertraline (ZOLOFT) 100 MG tablet; Take 1 tablet by mouth Daily.  Dispense: 30 tablet; Refill: 1    2. MDD (major depressive disorder), recurrent episode, mild (HCC)    3. Chronic post-traumatic stress disorder (PTSD)  -     busPIRone (BUSPAR) 30 MG tablet; Take 1 tablet by mouth 2 (Two) Times a Day. Monitor for serotonin syndrome as discussed  Dispense: 60 tablet; Refill: 1  -     OLANZapine (zyPREXA) 20 MG tablet; Take 1 tablet by mouth Every Night.  Dispense: 30 tablet; Refill: 1  -     prazosin (MINIPRESS) 5 MG capsule; Take 1 capsule by mouth every night at bedtime.  Dispense: 30 capsule; Refill: 1  -     sertraline (ZOLOFT) 100 MG tablet; Take 1 tablet by mouth Daily.  Dispense: 30 tablet; Refill: 1    4. Marijuana dependence ([facility])    5. Medication management    Patient's symptoms of PTSD, depression and  insomnia appear stabilized with current medication regimen.  Anxiety exacerbated after running out of BuSUbisense 1 month ago.  He states he takes medications as prescribed and denies medication side effects. He finds his medication regimen effective and prefers no other changes be made at this time.  Long term effects of marijuana use discussed such as poor  memory, impaired cognition, psychosis, falls, and oversedation,especially when used with opioids.     -Continue Zoloft 100 mg daily for symptoms of anxiety, depression, PTSD  -Continue prazosin 5 mg at bedtime for PTSD related nightmares  -Continue Zyprexa 20 mg at bedtime for symptoms of anxiety, depression, PTSD  -Continue mirtazapine 45 mg at night for symptoms of anxiety, depression, PTSD  -Continue BuSpar 30 mg p.o. twice daily for symptoms of anxiety, depression.  Suggested he start back at 15 mg twice daily and increase to 30 mg twice daily after 2 weeks to avoid intolerable side effects  -Patient strongly advised to avoid use of marijuana or other illicit substances use of psychotropic medications  -.Deni is encouraged to quit smoking. The nature of nicotine addiction is discussed and the adverse health conditions related to smoking are reviewed.He is aware various alternatives are available to help and is not interested in smoking cessation at this time.      Visit Diagnoses:    ICD-10-CM ICD-9-CM   1. VANDANA (generalized anxiety disorder)  F41.1 300.02   2. MDD (major depressive disorder), recurrent episode, mild (HCC)  F33.0 296.31   3. Chronic post-traumatic stress disorder (PTSD)  F43.12 309.81   4. Marijuana dependence (HCC)  F12.20 304.30   5. Medication management  Z79.899 V58.69     TREATMENT PLAN/GOALS:  Short Term  1. Pt will keep all appointments for med mgt   2. Pt will take medications as prescribed and report intolerable SE  3. Patient will stop using THC    Long term:   1. Pt will exhibit emotional stability  2. Pt will use coping skill to work through depression    MEDICATION ISSUES:  Discussed medication options and treatment plan of prescribed medication as well as the risks, benefits, and side effects including potential falls, possible impaired driving and metabolic adversities among others. Patient is agreeable to call the office with any worsening of symptoms or onset of side effects.  Patient is agreeable to call 911 or go to the nearest ER should he  begin having SI/HI.     MEDS ORDERED DURING VISIT:  New Medications Ordered This Visit   Medications   • busPIRone (BUSPAR) 30 MG tablet     Sig: Take 1 tablet by mouth 2 (Two) Times a Day. Monitor for serotonin syndrome as discussed     Dispense:  60 tablet     Refill:  1   • OLANZapine (zyPREXA) 20 MG tablet     Sig: Take 1 tablet by mouth Every Night.     Dispense:  30 tablet     Refill:  1   • prazosin (MINIPRESS) 5 MG capsule     Sig: Take 1 capsule by mouth every night at bedtime.     Dispense:  30 capsule     Refill:  1   • sertraline (ZOLOFT) 100 MG tablet     Sig: Take 1 tablet by mouth Daily.     Dispense:  30 tablet     Refill:  1     Return in about 8 weeks (around 3/24/2022).       Prognosis: Guarded dependent on medication/follow up and treatment plan compliance.      This document has been electronically signed by ROSALINE Cosby   January 27, 2022 11:01 EST    Part of this note may be an electronic transcription/translation of spoken language to printed text using the Dragon Dictation System.

## 2022-03-01 DIAGNOSIS — F41.1 GAD (GENERALIZED ANXIETY DISORDER): ICD-10-CM

## 2022-03-01 DIAGNOSIS — F33.42 MDD (MAJOR DEPRESSIVE DISORDER), RECURRENT, IN FULL REMISSION: ICD-10-CM

## 2022-03-01 DIAGNOSIS — F43.12 CHRONIC POST-TRAUMATIC STRESS DISORDER (PTSD): ICD-10-CM

## 2022-03-01 RX ORDER — MIRTAZAPINE 45 MG/1
45 TABLET, FILM COATED ORAL NIGHTLY
Qty: 30 TABLET | Refills: 1 | Status: SHIPPED | OUTPATIENT
Start: 2022-03-01 | End: 2022-05-02 | Stop reason: SDUPTHER

## 2022-03-30 DIAGNOSIS — F43.12 CHRONIC POST-TRAUMATIC STRESS DISORDER (PTSD): ICD-10-CM

## 2022-03-30 DIAGNOSIS — F33.42 MDD (MAJOR DEPRESSIVE DISORDER), RECURRENT, IN FULL REMISSION: ICD-10-CM

## 2022-03-30 DIAGNOSIS — F41.1 GAD (GENERALIZED ANXIETY DISORDER): ICD-10-CM

## 2022-03-31 RX ORDER — BUSPIRONE HYDROCHLORIDE 30 MG/1
30 TABLET ORAL 2 TIMES DAILY
Qty: 60 TABLET | Refills: 0 | Status: SHIPPED | OUTPATIENT
Start: 2022-03-31 | End: 2022-05-02 | Stop reason: SDUPTHER

## 2022-03-31 RX ORDER — MIRTAZAPINE 45 MG/1
45 TABLET, FILM COATED ORAL NIGHTLY
Qty: 30 TABLET | Refills: 1 | OUTPATIENT
Start: 2022-03-31

## 2022-03-31 RX ORDER — SERTRALINE HYDROCHLORIDE 100 MG/1
100 TABLET, FILM COATED ORAL DAILY
Qty: 30 TABLET | Refills: 0 | Status: SHIPPED | OUTPATIENT
Start: 2022-03-31 | End: 2022-05-02 | Stop reason: SDUPTHER

## 2022-03-31 RX ORDER — OLANZAPINE 20 MG/1
20 TABLET ORAL NIGHTLY
Qty: 30 TABLET | Refills: 0 | Status: SHIPPED | OUTPATIENT
Start: 2022-03-31 | End: 2022-05-02 | Stop reason: SDUPTHER

## 2022-03-31 RX ORDER — PRAZOSIN HYDROCHLORIDE 5 MG/1
5 CAPSULE ORAL
Qty: 30 CAPSULE | Refills: 0 | Status: SHIPPED | OUTPATIENT
Start: 2022-03-31 | End: 2022-05-02 | Stop reason: SDUPTHER

## 2022-05-02 DIAGNOSIS — F41.1 GAD (GENERALIZED ANXIETY DISORDER): ICD-10-CM

## 2022-05-02 DIAGNOSIS — F33.42 MDD (MAJOR DEPRESSIVE DISORDER), RECURRENT, IN FULL REMISSION: ICD-10-CM

## 2022-05-02 DIAGNOSIS — F43.12 CHRONIC POST-TRAUMATIC STRESS DISORDER (PTSD): ICD-10-CM

## 2022-05-02 RX ORDER — MIRTAZAPINE 45 MG/1
45 TABLET, FILM COATED ORAL NIGHTLY
Qty: 30 TABLET | Refills: 0 | Status: SHIPPED | OUTPATIENT
Start: 2022-05-02 | End: 2022-05-06 | Stop reason: SDUPTHER

## 2022-05-02 RX ORDER — BUSPIRONE HYDROCHLORIDE 30 MG/1
30 TABLET ORAL 2 TIMES DAILY
Qty: 60 TABLET | Refills: 0 | Status: SHIPPED | OUTPATIENT
Start: 2022-05-02 | End: 2022-05-06 | Stop reason: SDUPTHER

## 2022-05-02 RX ORDER — SERTRALINE HYDROCHLORIDE 100 MG/1
100 TABLET, FILM COATED ORAL DAILY
Qty: 30 TABLET | Refills: 0 | Status: SHIPPED | OUTPATIENT
Start: 2022-05-02 | End: 2022-05-06 | Stop reason: SDUPTHER

## 2022-05-02 RX ORDER — PRAZOSIN HYDROCHLORIDE 5 MG/1
5 CAPSULE ORAL
Qty: 30 CAPSULE | Refills: 0 | Status: SHIPPED | OUTPATIENT
Start: 2022-05-02 | End: 2022-05-06 | Stop reason: SDUPTHER

## 2022-05-02 RX ORDER — OLANZAPINE 20 MG/1
20 TABLET ORAL NIGHTLY
Qty: 30 TABLET | Refills: 0 | Status: SHIPPED | OUTPATIENT
Start: 2022-05-02 | End: 2022-05-06 | Stop reason: SDUPTHER

## 2022-05-06 ENCOUNTER — OFFICE VISIT (OUTPATIENT)
Dept: PSYCHIATRY | Facility: CLINIC | Age: 52
End: 2022-05-06

## 2022-05-06 ENCOUNTER — LAB (OUTPATIENT)
Dept: LAB | Facility: HOSPITAL | Age: 52
End: 2022-05-06

## 2022-05-06 VITALS
HEIGHT: 69 IN | SYSTOLIC BLOOD PRESSURE: 164 MMHG | DIASTOLIC BLOOD PRESSURE: 107 MMHG | WEIGHT: 176 LBS | BODY MASS INDEX: 26.07 KG/M2 | HEART RATE: 81 BPM

## 2022-05-06 DIAGNOSIS — F41.1 GAD (GENERALIZED ANXIETY DISORDER): ICD-10-CM

## 2022-05-06 DIAGNOSIS — Z79.899 MEDICATION MANAGEMENT: ICD-10-CM

## 2022-05-06 DIAGNOSIS — F12.20 MARIJUANA DEPENDENCE: ICD-10-CM

## 2022-05-06 DIAGNOSIS — F33.41 MDD (MAJOR DEPRESSIVE DISORDER), RECURRENT, IN PARTIAL REMISSION: ICD-10-CM

## 2022-05-06 DIAGNOSIS — F33.42 MDD (MAJOR DEPRESSIVE DISORDER), RECURRENT, IN FULL REMISSION: ICD-10-CM

## 2022-05-06 DIAGNOSIS — F43.12 CHRONIC POST-TRAUMATIC STRESS DISORDER (PTSD): Primary | ICD-10-CM

## 2022-05-06 DIAGNOSIS — F43.10 PTSD (POST-TRAUMATIC STRESS DISORDER): ICD-10-CM

## 2022-05-06 PROCEDURE — 80053 COMPREHEN METABOLIC PANEL: CPT | Performed by: NURSE PRACTITIONER

## 2022-05-06 PROCEDURE — 90792 PSYCH DIAG EVAL W/MED SRVCS: CPT | Performed by: NURSE PRACTITIONER

## 2022-05-06 PROCEDURE — 80061 LIPID PANEL: CPT | Performed by: NURSE PRACTITIONER

## 2022-05-06 PROCEDURE — 85025 COMPLETE CBC W/AUTO DIFF WBC: CPT | Performed by: NURSE PRACTITIONER

## 2022-05-06 PROCEDURE — 84439 ASSAY OF FREE THYROXINE: CPT | Performed by: NURSE PRACTITIONER

## 2022-05-06 PROCEDURE — 84443 ASSAY THYROID STIM HORMONE: CPT | Performed by: NURSE PRACTITIONER

## 2022-05-06 RX ORDER — MIRTAZAPINE 45 MG/1
45 TABLET, FILM COATED ORAL NIGHTLY
Qty: 30 TABLET | Refills: 2 | Status: SHIPPED | OUTPATIENT
Start: 2022-05-06 | End: 2022-08-11 | Stop reason: SDUPTHER

## 2022-05-06 RX ORDER — SERTRALINE HYDROCHLORIDE 100 MG/1
100 TABLET, FILM COATED ORAL DAILY
Qty: 30 TABLET | Refills: 2 | Status: SHIPPED | OUTPATIENT
Start: 2022-05-06 | End: 2022-08-11 | Stop reason: SDUPTHER

## 2022-05-06 RX ORDER — BUSPIRONE HYDROCHLORIDE 30 MG/1
30 TABLET ORAL 2 TIMES DAILY
Qty: 60 TABLET | Refills: 2 | Status: SHIPPED | OUTPATIENT
Start: 2022-05-06 | End: 2022-08-11 | Stop reason: SDUPTHER

## 2022-05-06 RX ORDER — OLANZAPINE 20 MG/1
20 TABLET ORAL NIGHTLY
Qty: 30 TABLET | Refills: 2 | Status: SHIPPED | OUTPATIENT
Start: 2022-05-06 | End: 2022-08-11 | Stop reason: SDUPTHER

## 2022-05-06 RX ORDER — PRAZOSIN HYDROCHLORIDE 5 MG/1
5 CAPSULE ORAL
Qty: 30 CAPSULE | Refills: 2 | Status: SHIPPED | OUTPATIENT
Start: 2022-05-06 | End: 2022-08-11 | Stop reason: SDUPTHER

## 2022-05-07 LAB
ALBUMIN SERPL-MCNC: 4.2 G/DL (ref 3.5–5.2)
ALBUMIN/GLOB SERPL: 1.5 G/DL
ALP SERPL-CCNC: 88 U/L (ref 39–117)
ALT SERPL W P-5'-P-CCNC: 30 U/L (ref 1–41)
ANION GAP SERPL CALCULATED.3IONS-SCNC: 10.5 MMOL/L (ref 5–15)
AST SERPL-CCNC: 35 U/L (ref 1–40)
BASOPHILS # BLD AUTO: 0.09 10*3/MM3 (ref 0–0.2)
BASOPHILS NFR BLD AUTO: 1.3 % (ref 0–1.5)
BILIRUB SERPL-MCNC: 0.3 MG/DL (ref 0–1.2)
BUN SERPL-MCNC: 9 MG/DL (ref 6–20)
BUN/CREAT SERPL: 7.1 (ref 7–25)
CALCIUM SPEC-SCNC: 8.9 MG/DL (ref 8.6–10.5)
CHLORIDE SERPL-SCNC: 99 MMOL/L (ref 98–107)
CHOLEST SERPL-MCNC: 164 MG/DL (ref 0–200)
CO2 SERPL-SCNC: 27.5 MMOL/L (ref 22–29)
CREAT SERPL-MCNC: 1.26 MG/DL (ref 0.76–1.27)
DEPRECATED RDW RBC AUTO: 42.2 FL (ref 37–54)
EGFRCR SERPLBLD CKD-EPI 2021: 68.2 ML/MIN/1.73
EOSINOPHIL # BLD AUTO: 0.12 10*3/MM3 (ref 0–0.4)
EOSINOPHIL NFR BLD AUTO: 1.7 % (ref 0.3–6.2)
ERYTHROCYTE [DISTWIDTH] IN BLOOD BY AUTOMATED COUNT: 13.1 % (ref 12.3–15.4)
GLOBULIN UR ELPH-MCNC: 2.8 GM/DL
GLUCOSE SERPL-MCNC: 105 MG/DL (ref 65–99)
HCT VFR BLD AUTO: 48.7 % (ref 37.5–51)
HDLC SERPL-MCNC: 30 MG/DL (ref 40–60)
HGB BLD-MCNC: 16.4 G/DL (ref 13–17.7)
IMM GRANULOCYTES # BLD AUTO: 0.02 10*3/MM3 (ref 0–0.05)
IMM GRANULOCYTES NFR BLD AUTO: 0.3 % (ref 0–0.5)
LDLC SERPL CALC-MCNC: 93 MG/DL (ref 0–100)
LDLC/HDLC SERPL: 2.88 {RATIO}
LYMPHOCYTES # BLD AUTO: 1.46 10*3/MM3 (ref 0.7–3.1)
LYMPHOCYTES NFR BLD AUTO: 20.9 % (ref 19.6–45.3)
MCH RBC QN AUTO: 29.4 PG (ref 26.6–33)
MCHC RBC AUTO-ENTMCNC: 33.7 G/DL (ref 31.5–35.7)
MCV RBC AUTO: 87.4 FL (ref 79–97)
MONOCYTES # BLD AUTO: 0.64 10*3/MM3 (ref 0.1–0.9)
MONOCYTES NFR BLD AUTO: 9.1 % (ref 5–12)
NEUTROPHILS NFR BLD AUTO: 4.67 10*3/MM3 (ref 1.7–7)
NEUTROPHILS NFR BLD AUTO: 66.7 % (ref 42.7–76)
NRBC BLD AUTO-RTO: 0 /100 WBC (ref 0–0.2)
PLATELET # BLD AUTO: 284 10*3/MM3 (ref 140–450)
PMV BLD AUTO: 9.9 FL (ref 6–12)
POTASSIUM SERPL-SCNC: 2.8 MMOL/L (ref 3.5–5.2)
PROT SERPL-MCNC: 7 G/DL (ref 6–8.5)
RBC # BLD AUTO: 5.57 10*6/MM3 (ref 4.14–5.8)
SODIUM SERPL-SCNC: 137 MMOL/L (ref 136–145)
T4 FREE SERPL-MCNC: 0.98 NG/DL (ref 0.93–1.7)
TRIGL SERPL-MCNC: 238 MG/DL (ref 0–150)
TSH SERPL DL<=0.05 MIU/L-ACNC: 1.08 UIU/ML (ref 0.27–4.2)
VLDLC SERPL-MCNC: 41 MG/DL (ref 5–40)
WBC NRBC COR # BLD: 7 10*3/MM3 (ref 3.4–10.8)

## 2022-08-10 NOTE — PROGRESS NOTES
Subjective   Deni Lopez is a 52 y.o. male who presents today for follow up    Chief Complaint:  Depression, anxiety, PTSD    History of Present Illness:   Today is a follow up visit. Patient is taking medication as directed, denies side effects. He states he is doing pretty good. He states medication is working. He is sleeping good, getting about to 8 to 9 hours a  Night, denies NM.  Depression rated 3/10, anxiety rated 5/10, with 10 being the worst. Appetite is good. He lives with his girlfriend, she broke her foot after a fall. His sister brought him today. Denies SI/HI/AVH.  Denies thoughts of self-harm. Chronic health issues, no acute physical or medical issues today. He states he smokes marijuana, has smoked all of his life, smokes 1 joint in the morning and 1 at night.          The following portions of the patient's history were reviewed and updated as appropriate: allergies, current medications, past family history, past medical history, past social history, past surgical history and problem list.      Past Medical History:  Past Medical History:   Diagnosis Date   • Anxiety    • Depression    • GERD (gastroesophageal reflux disease)    • Hepatitis C    • Hypertension    • Seizure (HCC)        Social History:  Social History     Socioeconomic History   • Marital status:    Tobacco Use   • Smoking status: Current Every Day Smoker     Packs/day: 1.00     Years: 20.00     Pack years: 20.00     Types: Cigarettes   • Smokeless tobacco: Never Used   Vaping Use   • Vaping Use: Never used   Substance and Sexual Activity   • Alcohol use: No   • Drug use: No   • Sexual activity: Yes     Partners: Female       Family History:  Family History   Problem Relation Age of Onset   • Drug abuse Father        Past Surgical History:  History reviewed. No pertinent surgical history.    Problem List:  Patient Active Problem List   Diagnosis   • Marijuana dependence (HCC)       Allergy:   Allergies   Allergen Reactions  "  • Codeine Sulfate Hives   • Penicillins Hives        Current Medications:   Current Outpatient Medications   Medication Sig Dispense Refill   • amLODIPine (NORVASC) 10 MG tablet      • Aptiom 800 MG tablet tablet Take 1 tablet by mouth Daily.     • busPIRone (BUSPAR) 30 MG tablet Take 1 tablet by mouth 2 (Two) Times a Day. Monitor for serotonin syndrome as discussed 60 tablet 2   • ibuprofen (ADVIL,MOTRIN) 600 MG tablet   0   • lisinopril (PRINIVIL,ZESTRIL) 40 MG tablet      • metoprolol tartrate (LOPRESSOR) 100 MG tablet      • mirtazapine (REMERON) 45 MG tablet Take 1 tablet by mouth Every Night. monitor for serotonin syndrome as discussed 30 tablet 2   • OLANZapine (zyPREXA) 20 MG tablet Take 1 tablet by mouth Every Night. 30 tablet 2   • pantoprazole (PROTONIX) 40 MG EC tablet Daily.     • potassium chloride 10 MEQ CR tablet Take 10 mEq by mouth Daily.     • prazosin (MINIPRESS) 5 MG capsule Take 1 capsule by mouth every night at bedtime. 30 capsule 2   • sertraline (ZOLOFT) 100 MG tablet Take 1 tablet by mouth Daily. 30 tablet 2     No current facility-administered medications for this visit.       Review of Symptoms:    Review of Systems   Psychiatric/Behavioral: Positive for depressed mood. Negative for self-injury, sleep disturbance and suicidal ideas. The patient is nervous/anxious.    All other systems reviewed and are negative.      Objective   Physical Exam:   Blood pressure 150/90, pulse 59, height 175.3 cm (69.02\"), weight 74.8 kg (165 lb).  Body mass index is 24.35 kg/m².    Appearance:  male appears stated age, no acute distress noted.    Gait, Station, Strength: Steady, posture erect, WNL      Mental Status Exam: 8/11/22  Hygiene:   good  Cooperation:  Cooperative  Eye Contact:  Good  Psychomotor Behavior:  Appropriate  Affect:  Appropriate  Mood: normal  Hopelessness: Denies  Speech:  Normal  Thought Process:  Goal directed and Linear  Thought Content:  Normal  Suicidal:  " None  Homicidal:  None  Hallucinations:  None  Delusion:  None  Memory:  Intact  Orientation:  Person, Place, Time and Situation  Reliability:  fair  Insight:  Fair  Judgement:  Fair  Impulse Control:  Fair  Physical/Medical Issues:  No      PHQ-Score Total:  PHQ-9 Total Score:      Lab Results:   No visits with results within 1 Month(s) from this visit.   Latest known visit with results is:   Office Visit on 05/06/2022   Component Date Value Ref Range Status   • Glucose 05/06/2022 105 (A) 65 - 99 mg/dL Final   • BUN 05/06/2022 9  6 - 20 mg/dL Final   • Creatinine 05/06/2022 1.26  0.76 - 1.27 mg/dL Final   • Sodium 05/06/2022 137  136 - 145 mmol/L Final   • Potassium 05/06/2022 2.8 (A) 3.5 - 5.2 mmol/L Final   • Chloride 05/06/2022 99  98 - 107 mmol/L Final   • CO2 05/06/2022 27.5  22.0 - 29.0 mmol/L Final   • Calcium 05/06/2022 8.9  8.6 - 10.5 mg/dL Final   • Total Protein 05/06/2022 7.0  6.0 - 8.5 g/dL Final   • Albumin 05/06/2022 4.20  3.50 - 5.20 g/dL Final   • ALT (SGPT) 05/06/2022 30  1 - 41 U/L Final   • AST (SGOT) 05/06/2022 35  1 - 40 U/L Final   • Alkaline Phosphatase 05/06/2022 88  39 - 117 U/L Final   • Total Bilirubin 05/06/2022 0.3  0.0 - 1.2 mg/dL Final   • Globulin 05/06/2022 2.8  gm/dL Final   • A/G Ratio 05/06/2022 1.5  g/dL Final   • BUN/Creatinine Ratio 05/06/2022 7.1  7.0 - 25.0 Final   • Anion Gap 05/06/2022 10.5  5.0 - 15.0 mmol/L Final   • eGFR 05/06/2022 68.2  >60.0 mL/min/1.73 Final    National Kidney Foundation and American Society of Nephrology (ASN) Task Force recommended calculation based on the Chronic Kidney Disease Epidemiology Collaboration (CKD-EPI) equation refit without adjustment for race.   • Total Cholesterol 05/06/2022 164  0 - 200 mg/dL Final   • Triglycerides 05/06/2022 238 (A) 0 - 150 mg/dL Final   • HDL Cholesterol 05/06/2022 30 (A) 40 - 60 mg/dL Final   • LDL Cholesterol  05/06/2022 93  0 - 100 mg/dL Final   • VLDL Cholesterol 05/06/2022 41 (A) 5 - 40 mg/dL Final   •  LDL/HDL Ratio 05/06/2022 2.88   Final   • TSH 05/06/2022 1.080  0.270 - 4.200 uIU/mL Final   • Free T4 05/06/2022 0.98  0.93 - 1.70 ng/dL Final   • WBC 05/06/2022 7.00  3.40 - 10.80 10*3/mm3 Final   • RBC 05/06/2022 5.57  4.14 - 5.80 10*6/mm3 Final   • Hemoglobin 05/06/2022 16.4  13.0 - 17.7 g/dL Final   • Hematocrit 05/06/2022 48.7  37.5 - 51.0 % Final   • MCV 05/06/2022 87.4  79.0 - 97.0 fL Final   • MCH 05/06/2022 29.4  26.6 - 33.0 pg Final   • MCHC 05/06/2022 33.7  31.5 - 35.7 g/dL Final   • RDW 05/06/2022 13.1  12.3 - 15.4 % Final   • RDW-SD 05/06/2022 42.2  37.0 - 54.0 fl Final   • MPV 05/06/2022 9.9  6.0 - 12.0 fL Final   • Platelets 05/06/2022 284  140 - 450 10*3/mm3 Final   • Neutrophil % 05/06/2022 66.7  42.7 - 76.0 % Final   • Lymphocyte % 05/06/2022 20.9  19.6 - 45.3 % Final   • Monocyte % 05/06/2022 9.1  5.0 - 12.0 % Final   • Eosinophil % 05/06/2022 1.7  0.3 - 6.2 % Final   • Basophil % 05/06/2022 1.3  0.0 - 1.5 % Final   • Immature Grans % 05/06/2022 0.3  0.0 - 0.5 % Final   • Neutrophils, Absolute 05/06/2022 4.67  1.70 - 7.00 10*3/mm3 Final   • Lymphocytes, Absolute 05/06/2022 1.46  0.70 - 3.10 10*3/mm3 Final   • Monocytes, Absolute 05/06/2022 0.64  0.10 - 0.90 10*3/mm3 Final   • Eosinophils, Absolute 05/06/2022 0.12  0.00 - 0.40 10*3/mm3 Final   • Basophils, Absolute 05/06/2022 0.09  0.00 - 0.20 10*3/mm3 Final   • Immature Grans, Absolute 05/06/2022 0.02  0.00 - 0.05 10*3/mm3 Final   • nRBC 05/06/2022 0.0  0.0 - 0.2 /100 WBC Final       Assessment & Plan   Problems Addressed this Visit        Mental Health    Marijuana dependence (HCC)      Other Visit Diagnoses     Chronic post-traumatic stress disorder (PTSD)    -  Primary    Relevant Medications    sertraline (ZOLOFT) 100 MG tablet    prazosin (MINIPRESS) 5 MG capsule    OLANZapine (zyPREXA) 20 MG tablet    mirtazapine (REMERON) 45 MG tablet    busPIRone (BUSPAR) 30 MG tablet    VANDANA (generalized anxiety disorder)        Relevant Medications     sertraline (ZOLOFT) 100 MG tablet    OLANZapine (zyPREXA) 20 MG tablet    mirtazapine (REMERON) 45 MG tablet    busPIRone (BUSPAR) 30 MG tablet    MDD (major depressive disorder), recurrent, in partial remission (HCC)        Relevant Medications    sertraline (ZOLOFT) 100 MG tablet    OLANZapine (zyPREXA) 20 MG tablet    mirtazapine (REMERON) 45 MG tablet    busPIRone (BUSPAR) 30 MG tablet    Medication management        MDD (major depressive disorder), recurrent, in full remission (HCC)        Relevant Medications    sertraline (ZOLOFT) 100 MG tablet    OLANZapine (zyPREXA) 20 MG tablet    mirtazapine (REMERON) 45 MG tablet    busPIRone (BUSPAR) 30 MG tablet      Diagnoses       Codes Comments    Chronic post-traumatic stress disorder (PTSD)    -  Primary ICD-10-CM: F43.12  ICD-9-CM: 309.81     VANDANA (generalized anxiety disorder)     ICD-10-CM: F41.1  ICD-9-CM: 300.02     Marijuana dependence (HCC)     ICD-10-CM: F12.20  ICD-9-CM: 304.30     MDD (major depressive disorder), recurrent, in partial remission (HCC)     ICD-10-CM: F33.41  ICD-9-CM: 296.35     Medication management     ICD-10-CM: Z79.899  ICD-9-CM: V58.69     MDD (major depressive disorder), recurrent, in full remission (HCC)     ICD-10-CM: F33.42  ICD-9-CM: 296.36         Social History     Tobacco Use   Smoking Status Current Every Day Smoker   • Packs/day: 1.00   • Years: 20.00   • Pack years: 20.00   • Types: Cigarettes   Smokeless Tobacco Never Used     LYNSEY reviewed and appropriate. Patient counseled on use of controlled substances.       -The benefits of a healthy diet and exercise were discussed with patient, especially the positive effects they have on mental health. Patient encouraged to consider lifestyle modification regarding  diet and exercise patterns to maximize results of mental health treatment.  -Reviewed previous available documentation  -Reviewed most recent available labs   -Lengthy discussion with patient on the possible side  effects of antipsychotic medications including increased cholesterol, increased blood sugar, and possibility of weight gain.  Also discussed the need to monitor lab work associated with this.  The risk of muscle movement disorders with this class of medication was also discussed.  -I've explained to him that drugs of the SSRI class can have side effects such as weight gain, sexual dysfunction, insomnia, headache, nausea. These medications are generally effective at alleviating symptoms of anxiety and/or depression. Let me know if significant side effects do occur.  -Encouraged to discontinue THC use.     Visit Diagnoses:    ICD-10-CM ICD-9-CM   1. Chronic post-traumatic stress disorder (PTSD)  F43.12 309.81   2. VANDANA (generalized anxiety disorder)  F41.1 300.02   3. Marijuana dependence (HCC)  F12.20 304.30   4. MDD (major depressive disorder), recurrent, in partial remission (HCC)  F33.41 296.35   5. Medication management  Z79.899 V58.69   6. MDD (major depressive disorder), recurrent, in full remission (HCC)  F33.42 296.36         TREATMENT PLAN/GOALS: Continue supportive psychotherapy efforts and medications as indicated. Treatment and medication options discussed during today's visit. Patient acknowledged and verbally consented to continue with current treatment plan and was educated on the importance of compliance with treatment and follow-up appointments.    MEDICATION ISSUES:  Discussed medication options and treatment plan of prescribed medication as well as the risks, benefits, and side effects including potential falls, possible impaired driving and metabolic adversities among others. Patient is agreeable to call the office with any worsening of symptoms or onset of side effects. Patient is agreeable to call 911 or go to the nearest ER should he/she begin having SI/HI.     MEDS ORDERED DURING VISIT:  New Medications Ordered This Visit   Medications   • sertraline (ZOLOFT) 100 MG tablet     Sig: Take 1  tablet by mouth Daily.     Dispense:  30 tablet     Refill:  2   • prazosin (MINIPRESS) 5 MG capsule     Sig: Take 1 capsule by mouth every night at bedtime.     Dispense:  30 capsule     Refill:  2   • OLANZapine (zyPREXA) 20 MG tablet     Sig: Take 1 tablet by mouth Every Night.     Dispense:  30 tablet     Refill:  2   • mirtazapine (REMERON) 45 MG tablet     Sig: Take 1 tablet by mouth Every Night. monitor for serotonin syndrome as discussed     Dispense:  30 tablet     Refill:  2   • busPIRone (BUSPAR) 30 MG tablet     Sig: Take 1 tablet by mouth 2 (Two) Times a Day. Monitor for serotonin syndrome as discussed     Dispense:  60 tablet     Refill:  2       Return in about 3 months (around 11/11/2022) for Recheck.  -Continue Zoloft 100 mg tablet take 1 tablet by mouth daily for depression  -Continue prazosin 5 mg capsule take 1 capsule by mouth every night at bedtime  -Continue olanzapine 20 mg tablet take 1 tablet by mouth every night  -Continue mirtazapine 45 mg tablet take 1 tablet by mouth every night  -Continue buspirone 30 mg tablet take 1 tablet by mouth 2 times a day for anxiety     Prognosis: Guarded dependent on medication/follow up and treatment plan compliance.  Functionality: pt showing improvements in important areas of daily functioning.     Short-term goals: Patient will adhere to medication regimen and note continued improvement in symptoms over the next 3 months.   Long-term goals: Patient will be adherent to medication management and psychotherapy with continued improvement in symptoms over the next 6 months    I spent 30 minutes caring for Deni on this date of service. This time includes time spent by me in the following activities: preparing for the visit, obtaining and/or reviewing a separately obtained history, performing a medically appropriate examination and/or evaluation, counseling and educating the patient/family/caregiver, ordering medications, tests, or procedures and documenting  information in the medical record        This document has been electronically signed by ROSALINE Marte   August 11, 2022 16:07 EDT    Part of this note may be an electronic transcription/translation of spoken language to printed text using the Dragon Dictation System.

## 2022-08-11 ENCOUNTER — OFFICE VISIT (OUTPATIENT)
Dept: PSYCHIATRY | Facility: CLINIC | Age: 52
End: 2022-08-11

## 2022-08-11 VITALS
HEART RATE: 59 BPM | BODY MASS INDEX: 24.44 KG/M2 | HEIGHT: 69 IN | SYSTOLIC BLOOD PRESSURE: 150 MMHG | DIASTOLIC BLOOD PRESSURE: 90 MMHG | WEIGHT: 165 LBS

## 2022-08-11 DIAGNOSIS — F33.42 MDD (MAJOR DEPRESSIVE DISORDER), RECURRENT, IN FULL REMISSION: ICD-10-CM

## 2022-08-11 DIAGNOSIS — F41.1 GAD (GENERALIZED ANXIETY DISORDER): ICD-10-CM

## 2022-08-11 DIAGNOSIS — F12.20 MARIJUANA DEPENDENCE: ICD-10-CM

## 2022-08-11 DIAGNOSIS — Z79.899 MEDICATION MANAGEMENT: ICD-10-CM

## 2022-08-11 DIAGNOSIS — F33.41 MDD (MAJOR DEPRESSIVE DISORDER), RECURRENT, IN PARTIAL REMISSION: ICD-10-CM

## 2022-08-11 DIAGNOSIS — F43.12 CHRONIC POST-TRAUMATIC STRESS DISORDER (PTSD): Primary | ICD-10-CM

## 2022-08-11 PROCEDURE — 99214 OFFICE O/P EST MOD 30 MIN: CPT | Performed by: NURSE PRACTITIONER

## 2022-08-11 RX ORDER — PRAZOSIN HYDROCHLORIDE 5 MG/1
5 CAPSULE ORAL
Qty: 30 CAPSULE | Refills: 2 | Status: SHIPPED | OUTPATIENT
Start: 2022-08-11 | End: 2022-10-31 | Stop reason: SDUPTHER

## 2022-08-11 RX ORDER — BUSPIRONE HYDROCHLORIDE 30 MG/1
30 TABLET ORAL 2 TIMES DAILY
Qty: 60 TABLET | Refills: 2 | Status: SHIPPED | OUTPATIENT
Start: 2022-08-11 | End: 2022-10-31 | Stop reason: SDUPTHER

## 2022-08-11 RX ORDER — OLANZAPINE 20 MG/1
20 TABLET ORAL NIGHTLY
Qty: 30 TABLET | Refills: 2 | Status: SHIPPED | OUTPATIENT
Start: 2022-08-11 | End: 2022-10-31 | Stop reason: SDUPTHER

## 2022-08-11 RX ORDER — SERTRALINE HYDROCHLORIDE 100 MG/1
100 TABLET, FILM COATED ORAL DAILY
Qty: 30 TABLET | Refills: 2 | Status: SHIPPED | OUTPATIENT
Start: 2022-08-11 | End: 2022-10-31 | Stop reason: SDUPTHER

## 2022-08-11 RX ORDER — MIRTAZAPINE 45 MG/1
45 TABLET, FILM COATED ORAL NIGHTLY
Qty: 30 TABLET | Refills: 2 | Status: SHIPPED | OUTPATIENT
Start: 2022-08-11 | End: 2022-10-31 | Stop reason: SDUPTHER

## 2022-10-26 NOTE — PROGRESS NOTES
Subjective   Deni Lopez is a 52 y.o. male who presents today for follow up    Chief Complaint:  Depression, anxiety, PTSD    History of Present Illness:   History of Present Illness  Today is a follow up visit. Patient is taking medication as directed, denies side effects. Things are going pretty good.   Depression rated 4/10, anxiety rated 4/10, with 10 being the worst.   Sleep is poor, difficulty going to sleep, getting about 6 hours a night, denies NM.  Appetite is good.   Denies SI/HI/AVH.  Denies thoughts of self-harm.  Chronic health issues, no acute physical or medical issues today         The following portions of the patient's history were reviewed and updated as appropriate: allergies, current medications, past family history, past medical history, past social history, past surgical history and problem list.      Past Medical History:  Past Medical History:   Diagnosis Date   • Anxiety    • Depression    • GERD (gastroesophageal reflux disease)    • Hepatitis C    • Hypertension    • Seizure (HCC)        Social History:  Social History     Socioeconomic History   • Marital status:    Tobacco Use   • Smoking status: Every Day     Packs/day: 1.00     Years: 20.00     Pack years: 20.00     Types: Cigarettes   • Smokeless tobacco: Never   Vaping Use   • Vaping Use: Never used   Substance and Sexual Activity   • Alcohol use: No   • Drug use: No   • Sexual activity: Yes     Partners: Female       Family History:  Family History   Problem Relation Age of Onset   • Drug abuse Father        Past Surgical History:  No past surgical history on file.    Problem List:  Patient Active Problem List   Diagnosis   • Marijuana dependence (HCC)       Allergy:   Allergies   Allergen Reactions   • Codeine Sulfate Hives   • Penicillins Hives        Current Medications:   Current Outpatient Medications   Medication Sig Dispense Refill   • amLODIPine (NORVASC) 10 MG tablet      • Aptiom 800 MG tablet tablet Take 1  "tablet by mouth Daily.     • busPIRone (BUSPAR) 30 MG tablet Take 1 tablet by mouth 2 (Two) Times a Day. Monitor for serotonin syndrome as discussed 60 tablet 2   • ibuprofen (ADVIL,MOTRIN) 600 MG tablet   0   • lisinopril (PRINIVIL,ZESTRIL) 40 MG tablet      • metoprolol tartrate (LOPRESSOR) 100 MG tablet      • mirtazapine (REMERON) 45 MG tablet Take 1 tablet by mouth Every Night. monitor for serotonin syndrome as discussed 30 tablet 2   • Nayzilam 5 MG/0.1ML solution USE 1 SPRAY BY NOSTRIL     • OLANZapine (zyPREXA) 20 MG tablet Take 1 tablet by mouth Every Night. 30 tablet 2   • pantoprazole (PROTONIX) 40 MG EC tablet Daily.     • potassium chloride 10 MEQ CR tablet Take 10 mEq by mouth Daily.     • prazosin (MINIPRESS) 5 MG capsule Take 1 capsule by mouth every night at bedtime. 30 capsule 2   • sertraline (ZOLOFT) 100 MG tablet Take 1 tablet by mouth Daily. 30 tablet 2     No current facility-administered medications for this visit.       Review of Symptoms:    Review of Systems   Psychiatric/Behavioral: Positive for sleep disturbance and depressed mood. Negative for dysphoric mood, hallucinations, self-injury, suicidal ideas and stress. The patient is nervous/anxious.    All other systems reviewed and are negative.      Objective   Physical Exam:   Blood pressure 160/80, height 175.3 cm (69\"), weight 76.6 kg (168 lb 12.8 oz).  Body mass index is 24.93 kg/m².    Appearance:  male appears stated age, no acute distress noted.    Gait, Station, Strength: Steady, posture erect, WNL      Mental Status Exam: 10/31/22  Hygiene:   good  Cooperation:  Cooperative  Eye Contact:  Good  Psychomotor Behavior:  Appropriate  Affect:  Appropriate  Mood: normal  Hopelessness: Denies  Speech:  Normal  Thought Process:  Goal directed and Linear  Thought Content:  Normal  Suicidal:  None  Homicidal:  None  Hallucinations:  None  Delusion:  None  Memory:  Intact  Orientation:  Person, Place, Time and " Situation  Reliability:  fair  Insight:  Fair  Judgement:  Fair  Impulse Control:  Fair  Physical/Medical Issues:  No      PHQ-Score Total:  PHQ-9 Total Score:      Lab Results:   No visits with results within 1 Month(s) from this visit.   Latest known visit with results is:   Office Visit on 05/06/2022   Component Date Value Ref Range Status   • Glucose 05/06/2022 105 (H)  65 - 99 mg/dL Final   • BUN 05/06/2022 9  6 - 20 mg/dL Final   • Creatinine 05/06/2022 1.26  0.76 - 1.27 mg/dL Final   • Sodium 05/06/2022 137  136 - 145 mmol/L Final   • Potassium 05/06/2022 2.8 (L)  3.5 - 5.2 mmol/L Final   • Chloride 05/06/2022 99  98 - 107 mmol/L Final   • CO2 05/06/2022 27.5  22.0 - 29.0 mmol/L Final   • Calcium 05/06/2022 8.9  8.6 - 10.5 mg/dL Final   • Total Protein 05/06/2022 7.0  6.0 - 8.5 g/dL Final   • Albumin 05/06/2022 4.20  3.50 - 5.20 g/dL Final   • ALT (SGPT) 05/06/2022 30  1 - 41 U/L Final   • AST (SGOT) 05/06/2022 35  1 - 40 U/L Final   • Alkaline Phosphatase 05/06/2022 88  39 - 117 U/L Final   • Total Bilirubin 05/06/2022 0.3  0.0 - 1.2 mg/dL Final   • Globulin 05/06/2022 2.8  gm/dL Final   • A/G Ratio 05/06/2022 1.5  g/dL Final   • BUN/Creatinine Ratio 05/06/2022 7.1  7.0 - 25.0 Final   • Anion Gap 05/06/2022 10.5  5.0 - 15.0 mmol/L Final   • eGFR 05/06/2022 68.2  >60.0 mL/min/1.73 Final    National Kidney Foundation and American Society of Nephrology (ASN) Task Force recommended calculation based on the Chronic Kidney Disease Epidemiology Collaboration (CKD-EPI) equation refit without adjustment for race.   • Total Cholesterol 05/06/2022 164  0 - 200 mg/dL Final   • Triglycerides 05/06/2022 238 (H)  0 - 150 mg/dL Final   • HDL Cholesterol 05/06/2022 30 (L)  40 - 60 mg/dL Final   • LDL Cholesterol  05/06/2022 93  0 - 100 mg/dL Final   • VLDL Cholesterol 05/06/2022 41 (H)  5 - 40 mg/dL Final   • LDL/HDL Ratio 05/06/2022 2.88   Final   • TSH 05/06/2022 1.080  0.270 - 4.200 uIU/mL Final   • Free T4 05/06/2022  0.98  0.93 - 1.70 ng/dL Final   • WBC 05/06/2022 7.00  3.40 - 10.80 10*3/mm3 Final   • RBC 05/06/2022 5.57  4.14 - 5.80 10*6/mm3 Final   • Hemoglobin 05/06/2022 16.4  13.0 - 17.7 g/dL Final   • Hematocrit 05/06/2022 48.7  37.5 - 51.0 % Final   • MCV 05/06/2022 87.4  79.0 - 97.0 fL Final   • MCH 05/06/2022 29.4  26.6 - 33.0 pg Final   • MCHC 05/06/2022 33.7  31.5 - 35.7 g/dL Final   • RDW 05/06/2022 13.1  12.3 - 15.4 % Final   • RDW-SD 05/06/2022 42.2  37.0 - 54.0 fl Final   • MPV 05/06/2022 9.9  6.0 - 12.0 fL Final   • Platelets 05/06/2022 284  140 - 450 10*3/mm3 Final   • Neutrophil % 05/06/2022 66.7  42.7 - 76.0 % Final   • Lymphocyte % 05/06/2022 20.9  19.6 - 45.3 % Final   • Monocyte % 05/06/2022 9.1  5.0 - 12.0 % Final   • Eosinophil % 05/06/2022 1.7  0.3 - 6.2 % Final   • Basophil % 05/06/2022 1.3  0.0 - 1.5 % Final   • Immature Grans % 05/06/2022 0.3  0.0 - 0.5 % Final   • Neutrophils, Absolute 05/06/2022 4.67  1.70 - 7.00 10*3/mm3 Final   • Lymphocytes, Absolute 05/06/2022 1.46  0.70 - 3.10 10*3/mm3 Final   • Monocytes, Absolute 05/06/2022 0.64  0.10 - 0.90 10*3/mm3 Final   • Eosinophils, Absolute 05/06/2022 0.12  0.00 - 0.40 10*3/mm3 Final   • Basophils, Absolute 05/06/2022 0.09  0.00 - 0.20 10*3/mm3 Final   • Immature Grans, Absolute 05/06/2022 0.02  0.00 - 0.05 10*3/mm3 Final   • nRBC 05/06/2022 0.0  0.0 - 0.2 /100 WBC Final       Assessment & Plan   Problems Addressed this Visit        Mental Health    Marijuana dependence (HCC)   Other Visit Diagnoses     Chronic post-traumatic stress disorder (PTSD)    -  Primary    Relevant Medications    sertraline (ZOLOFT) 100 MG tablet    prazosin (MINIPRESS) 5 MG capsule    OLANZapine (zyPREXA) 20 MG tablet    mirtazapine (REMERON) 45 MG tablet    busPIRone (BUSPAR) 30 MG tablet    VANDANA (generalized anxiety disorder)        Relevant Medications    sertraline (ZOLOFT) 100 MG tablet    OLANZapine (zyPREXA) 20 MG tablet    mirtazapine (REMERON) 45 MG tablet     busPIRone (BUSPAR) 30 MG tablet    MDD (major depressive disorder), recurrent, in partial remission (HCC)        Relevant Medications    sertraline (ZOLOFT) 100 MG tablet    OLANZapine (zyPREXA) 20 MG tablet    mirtazapine (REMERON) 45 MG tablet    busPIRone (BUSPAR) 30 MG tablet    Medication management        MDD (major depressive disorder), recurrent, in full remission (HCC)        Relevant Medications    sertraline (ZOLOFT) 100 MG tablet    OLANZapine (zyPREXA) 20 MG tablet    mirtazapine (REMERON) 45 MG tablet    busPIRone (BUSPAR) 30 MG tablet      Diagnoses       Codes Comments    Chronic post-traumatic stress disorder (PTSD)    -  Primary ICD-10-CM: F43.12  ICD-9-CM: 309.81     VANDANA (generalized anxiety disorder)     ICD-10-CM: F41.1  ICD-9-CM: 300.02     Marijuana dependence (HCC)     ICD-10-CM: F12.20  ICD-9-CM: 304.30     MDD (major depressive disorder), recurrent, in partial remission (HCC)     ICD-10-CM: F33.41  ICD-9-CM: 296.35     Medication management     ICD-10-CM: Z79.899  ICD-9-CM: V58.69     MDD (major depressive disorder), recurrent, in full remission (HCC)     ICD-10-CM: F33.42  ICD-9-CM: 296.36         Social History     Tobacco Use   Smoking Status Every Day   • Packs/day: 1.00   • Years: 20.00   • Pack years: 20.00   • Types: Cigarettes   Smokeless Tobacco Never     LYNSEY reviewed and appropriate. Patient counseled on use of controlled substances.       -The benefits of a healthy diet and exercise were discussed with patient, especially the positive effects they have on mental health. Patient encouraged to consider lifestyle modification regarding  diet and exercise patterns to maximize results of mental health treatment.  -Reviewed previous available documentation  -Reviewed most recent available labs   -Lengthy discussion with patient on the possible side effects of antipsychotic medications including increased cholesterol, increased blood sugar, and possibility of weight gain.  Also  discussed the need to monitor lab work associated with this.  The risk of muscle movement disorders with this class of medication was also discussed.  -I've explained to him that drugs of the SSRI class can have side effects such as weight gain, sexual dysfunction, insomnia, headache, nausea. These medications are generally effective at alleviating symptoms of anxiety and/or depression. Let me know if significant side effects do occur.  -Encouraged to discontinue THC use.     Visit Diagnoses:    ICD-10-CM ICD-9-CM   1. Chronic post-traumatic stress disorder (PTSD)  F43.12 309.81   2. VANDANA (generalized anxiety disorder)  F41.1 300.02   3. Marijuana dependence (HCC)  F12.20 304.30   4. MDD (major depressive disorder), recurrent, in partial remission (HCC)  F33.41 296.35   5. Medication management  Z79.899 V58.69   6. MDD (major depressive disorder), recurrent, in full remission (HCC)  F33.42 296.36         TREATMENT PLAN/GOALS: Continue supportive psychotherapy efforts and medications as indicated. Treatment and medication options discussed during today's visit. Patient acknowledged and verbally consented to continue with current treatment plan and was educated on the importance of compliance with treatment and follow-up appointments.    MEDICATION ISSUES:  Discussed medication options and treatment plan of prescribed medication as well as the risks, benefits, and side effects including potential falls, possible impaired driving and metabolic adversities among others. Patient is agreeable to call the office with any worsening of symptoms or onset of side effects. Patient is agreeable to call 911 or go to the nearest ER should he/she begin having SI/HI.     MEDS ORDERED DURING VISIT:  New Medications Ordered This Visit   Medications   • sertraline (ZOLOFT) 100 MG tablet     Sig: Take 1 tablet by mouth Daily.     Dispense:  30 tablet     Refill:  2   • prazosin (MINIPRESS) 5 MG capsule     Sig: Take 1 capsule by mouth  every night at bedtime.     Dispense:  30 capsule     Refill:  2   • OLANZapine (zyPREXA) 20 MG tablet     Sig: Take 1 tablet by mouth Every Night.     Dispense:  30 tablet     Refill:  2   • mirtazapine (REMERON) 45 MG tablet     Sig: Take 1 tablet by mouth Every Night. monitor for serotonin syndrome as discussed     Dispense:  30 tablet     Refill:  2   • busPIRone (BUSPAR) 30 MG tablet     Sig: Take 1 tablet by mouth 2 (Two) Times a Day. Monitor for serotonin syndrome as discussed     Dispense:  60 tablet     Refill:  2       No follow-ups on file.  -Continue Zoloft 100 mg tablet take 1 tablet by mouth daily for depression  -Continue prazosin 5 mg capsule take 1 capsule by mouth every night at bedtime  -Continue olanzapine 20 mg tablet take 1 tablet by mouth every night  -Continue mirtazapine 45 mg tablet take 1 tablet by mouth every night  -Continue buspirone 30 mg tablet take 1 tablet by mouth 2 times a day for anxiety     Prognosis: Guarded dependent on medication/follow up and treatment plan compliance.  Functionality: pt showing improvements in important areas of daily functioning.     Short-term goals: Patient will adhere to medication regimen and note continued improvement in symptoms over the next 3 months.   Long-term goals: Patient will be adherent to medication management and psychotherapy with continued improvement in symptoms over the next 6 months    I spent 30 minutes caring for Deni on this date of service. This time includes time spent by me in the following activities: preparing for the visit, obtaining and/or reviewing a separately obtained history, performing a medically appropriate examination and/or evaluation, counseling and educating the patient/family/caregiver, ordering medications, tests, or procedures and documenting information in the medical record        This document has been electronically signed by ROSALINE Elam   October 31, 2022 10:35 EDT    Part of this note may  be an electronic transcription/translation of spoken language to printed text using the Dragon Dictation System.

## 2022-10-31 ENCOUNTER — OFFICE VISIT (OUTPATIENT)
Dept: PSYCHIATRY | Facility: CLINIC | Age: 52
End: 2022-10-31

## 2022-10-31 VITALS
HEIGHT: 69 IN | SYSTOLIC BLOOD PRESSURE: 160 MMHG | DIASTOLIC BLOOD PRESSURE: 80 MMHG | BODY MASS INDEX: 25 KG/M2 | WEIGHT: 168.8 LBS

## 2022-10-31 DIAGNOSIS — Z79.899 MEDICATION MANAGEMENT: ICD-10-CM

## 2022-10-31 DIAGNOSIS — F41.1 GAD (GENERALIZED ANXIETY DISORDER): ICD-10-CM

## 2022-10-31 DIAGNOSIS — F33.41 MDD (MAJOR DEPRESSIVE DISORDER), RECURRENT, IN PARTIAL REMISSION: ICD-10-CM

## 2022-10-31 DIAGNOSIS — F12.20 MARIJUANA DEPENDENCE: ICD-10-CM

## 2022-10-31 DIAGNOSIS — F33.42 MDD (MAJOR DEPRESSIVE DISORDER), RECURRENT, IN FULL REMISSION: ICD-10-CM

## 2022-10-31 DIAGNOSIS — F43.12 CHRONIC POST-TRAUMATIC STRESS DISORDER (PTSD): Primary | ICD-10-CM

## 2022-10-31 PROCEDURE — 99214 OFFICE O/P EST MOD 30 MIN: CPT | Performed by: NURSE PRACTITIONER

## 2022-10-31 RX ORDER — BUSPIRONE HYDROCHLORIDE 30 MG/1
30 TABLET ORAL 2 TIMES DAILY
Qty: 60 TABLET | Refills: 2 | Status: SHIPPED | OUTPATIENT
Start: 2022-10-31 | End: 2023-02-13 | Stop reason: SDUPTHER

## 2022-10-31 RX ORDER — MIRTAZAPINE 45 MG/1
45 TABLET, FILM COATED ORAL NIGHTLY
Qty: 30 TABLET | Refills: 2 | Status: SHIPPED | OUTPATIENT
Start: 2022-10-31 | End: 2023-01-18

## 2022-10-31 RX ORDER — OLANZAPINE 20 MG/1
20 TABLET ORAL NIGHTLY
Qty: 30 TABLET | Refills: 2 | Status: SHIPPED | OUTPATIENT
Start: 2022-10-31 | End: 2023-02-13 | Stop reason: SDUPTHER

## 2022-10-31 RX ORDER — PRAZOSIN HYDROCHLORIDE 5 MG/1
5 CAPSULE ORAL
Qty: 30 CAPSULE | Refills: 2 | Status: SHIPPED | OUTPATIENT
Start: 2022-10-31 | End: 2023-02-13 | Stop reason: SDUPTHER

## 2022-10-31 RX ORDER — MIDAZOLAM 5 MG/.1ML
SPRAY NASAL
COMMUNITY
Start: 2022-10-26

## 2022-10-31 RX ORDER — SERTRALINE HYDROCHLORIDE 100 MG/1
100 TABLET, FILM COATED ORAL DAILY
Qty: 30 TABLET | Refills: 2 | Status: SHIPPED | OUTPATIENT
Start: 2022-10-31 | End: 2023-02-13 | Stop reason: SDUPTHER

## 2023-01-17 DIAGNOSIS — F33.42 MDD (MAJOR DEPRESSIVE DISORDER), RECURRENT, IN FULL REMISSION: ICD-10-CM

## 2023-01-17 DIAGNOSIS — F43.12 CHRONIC POST-TRAUMATIC STRESS DISORDER (PTSD): ICD-10-CM

## 2023-01-17 DIAGNOSIS — F41.1 GAD (GENERALIZED ANXIETY DISORDER): ICD-10-CM

## 2023-01-18 RX ORDER — MIRTAZAPINE 45 MG/1
45 TABLET, FILM COATED ORAL NIGHTLY
Qty: 30 TABLET | Refills: 2 | Status: SHIPPED | OUTPATIENT
Start: 2023-01-18 | End: 2023-02-13 | Stop reason: SDUPTHER

## 2023-02-13 DIAGNOSIS — F33.42 MDD (MAJOR DEPRESSIVE DISORDER), RECURRENT, IN FULL REMISSION: ICD-10-CM

## 2023-02-13 DIAGNOSIS — F43.12 CHRONIC POST-TRAUMATIC STRESS DISORDER (PTSD): ICD-10-CM

## 2023-02-13 DIAGNOSIS — F41.1 GAD (GENERALIZED ANXIETY DISORDER): ICD-10-CM

## 2023-02-13 RX ORDER — PRAZOSIN HYDROCHLORIDE 5 MG/1
5 CAPSULE ORAL
Qty: 30 CAPSULE | Refills: 2 | Status: SHIPPED | OUTPATIENT
Start: 2023-02-13 | End: 2023-04-05 | Stop reason: SDUPTHER

## 2023-02-13 RX ORDER — BUSPIRONE HYDROCHLORIDE 30 MG/1
30 TABLET ORAL 2 TIMES DAILY
Qty: 60 TABLET | Refills: 2 | Status: SHIPPED | OUTPATIENT
Start: 2023-02-13 | End: 2023-04-05 | Stop reason: SDUPTHER

## 2023-02-13 RX ORDER — MIRTAZAPINE 45 MG/1
45 TABLET, FILM COATED ORAL NIGHTLY
Qty: 30 TABLET | Refills: 2 | Status: SHIPPED | OUTPATIENT
Start: 2023-02-13 | End: 2023-04-05 | Stop reason: SDUPTHER

## 2023-02-13 RX ORDER — OLANZAPINE 20 MG/1
20 TABLET ORAL NIGHTLY
Qty: 30 TABLET | Refills: 2 | Status: SHIPPED | OUTPATIENT
Start: 2023-02-13 | End: 2023-04-05 | Stop reason: SDUPTHER

## 2023-02-13 RX ORDER — SERTRALINE HYDROCHLORIDE 100 MG/1
100 TABLET, FILM COATED ORAL DAILY
Qty: 30 TABLET | Refills: 2 | Status: SHIPPED | OUTPATIENT
Start: 2023-02-13 | End: 2023-04-05 | Stop reason: SDUPTHER

## 2023-04-03 NOTE — PROGRESS NOTES
"Subjective   Deni Lopez is a 52 y.o. male who presents today for follow up    Chief Complaint:  Depression, anxiety, PTSD    History of Present Illness:   History of Present Illness  Today is a follow up visit. Patient is taking medication as directed, denies side effects. He states things are going ok. He lives with his girlfriend, been together about 30 years, gets along well, \"never an ill word\".   Depression rated 4/10, anxiety rated 6/10, with 10 being the worst.   Sleep is poor, difficulty going to sleep, getting about 5-6 hours a night, has occasional nightmares.  Appetite is good.   Denies SI/HI/AVH.  Denies thoughts of self-harm.  Chronic health issues, no acute physical or medical issues today         The following portions of the patient's history were reviewed and updated as appropriate: allergies, current medications, past family history, past medical history, past social history, past surgical history and problem list.      Past Medical History:  Past Medical History:   Diagnosis Date   • Anxiety    • Depression    • GERD (gastroesophageal reflux disease)    • Hepatitis C    • Hypertension    • Seizure        Social History:  Social History     Socioeconomic History   • Marital status:    Tobacco Use   • Smoking status: Every Day     Packs/day: 1.00     Years: 20.00     Pack years: 20.00     Types: Cigarettes   • Smokeless tobacco: Never   Vaping Use   • Vaping Use: Never used   Substance and Sexual Activity   • Alcohol use: No   • Drug use: No   • Sexual activity: Yes     Partners: Female       Family History:  Family History   Problem Relation Age of Onset   • Drug abuse Father        Past Surgical History:  History reviewed. No pertinent surgical history.    Problem List:  Patient Active Problem List   Diagnosis   • Marijuana dependence       Allergy:   Allergies   Allergen Reactions   • Codeine Sulfate Hives   • Penicillins Hives        Current Medications:   Current Outpatient " "Medications   Medication Sig Dispense Refill   • amLODIPine (NORVASC) 10 MG tablet      • Aptiom 800 MG tablet tablet Take 1 tablet by mouth Daily.     • busPIRone (BUSPAR) 30 MG tablet Take 1 tablet by mouth 2 (Two) Times a Day. Monitor for serotonin syndrome as discussed 60 tablet 2   • ibuprofen (ADVIL,MOTRIN) 600 MG tablet   0   • lisinopril (PRINIVIL,ZESTRIL) 40 MG tablet      • metoprolol tartrate (LOPRESSOR) 100 MG tablet      • mirtazapine (REMERON) 45 MG tablet Take 1 tablet by mouth Every Night. monitor for serotonin syndrome as discussed 30 tablet 2   • Nayzilam 5 MG/0.1ML solution USE 1 SPRAY BY NOSTRIL     • OLANZapine (zyPREXA) 20 MG tablet Take 1 tablet by mouth Every Night. 30 tablet 2   • pantoprazole (PROTONIX) 40 MG EC tablet Daily.     • potassium chloride 10 MEQ CR tablet Take 1 tablet by mouth Daily.     • prazosin (MINIPRESS) 5 MG capsule Take 1 capsule by mouth every night at bedtime. 30 capsule 2   • sertraline (ZOLOFT) 100 MG tablet Take 1 tablet by mouth Daily. 30 tablet 2     No current facility-administered medications for this visit.       Review of Symptoms:    Review of Systems   Psychiatric/Behavioral: Positive for sleep disturbance and depressed mood. Negative for dysphoric mood, hallucinations, self-injury, suicidal ideas and stress. The patient is nervous/anxious.    All other systems reviewed and are negative.      Objective   Physical Exam:   Blood pressure 122/80, pulse 62, height 175.3 cm (69.02\"), weight 75 kg (165 lb 6.4 oz).  Body mass index is 24.41 kg/m².    Appearance:  male appears stated age, no acute distress noted.    Gait, Station, Strength: Steady, posture erect, WNL      Mental Status Exam: 4/5/23  Hygiene:   good  Cooperation:  Cooperative  Eye Contact:  Good  Psychomotor Behavior:  Appropriate  Affect:  Appropriate  Mood: normal  Hopelessness: Denies  Speech:  Normal  Thought Process:  Linear  Thought Content:  Normal  Suicidal:  None  Homicidal:  " None  Hallucinations:  None  Delusion:  None  Memory:  Intact  Orientation:  Person, Place, Time and Situation  Reliability:  fair  Insight:  Fair  Judgement:  Fair  Impulse Control:  Fair      PHQ-Score Total:  PHQ-9 Total Score:      Lab Results:   No visits with results within 1 Month(s) from this visit.   Latest known visit with results is:   Office Visit on 05/06/2022   Component Date Value Ref Range Status   • Glucose 05/06/2022 105 (H)  65 - 99 mg/dL Final   • BUN 05/06/2022 9  6 - 20 mg/dL Final   • Creatinine 05/06/2022 1.26  0.76 - 1.27 mg/dL Final   • Sodium 05/06/2022 137  136 - 145 mmol/L Final   • Potassium 05/06/2022 2.8 (L)  3.5 - 5.2 mmol/L Final   • Chloride 05/06/2022 99  98 - 107 mmol/L Final   • CO2 05/06/2022 27.5  22.0 - 29.0 mmol/L Final   • Calcium 05/06/2022 8.9  8.6 - 10.5 mg/dL Final   • Total Protein 05/06/2022 7.0  6.0 - 8.5 g/dL Final   • Albumin 05/06/2022 4.20  3.50 - 5.20 g/dL Final   • ALT (SGPT) 05/06/2022 30  1 - 41 U/L Final   • AST (SGOT) 05/06/2022 35  1 - 40 U/L Final   • Alkaline Phosphatase 05/06/2022 88  39 - 117 U/L Final   • Total Bilirubin 05/06/2022 0.3  0.0 - 1.2 mg/dL Final   • Globulin 05/06/2022 2.8  gm/dL Final   • A/G Ratio 05/06/2022 1.5  g/dL Final   • BUN/Creatinine Ratio 05/06/2022 7.1  7.0 - 25.0 Final   • Anion Gap 05/06/2022 10.5  5.0 - 15.0 mmol/L Final   • eGFR 05/06/2022 68.2  >60.0 mL/min/1.73 Final    National Kidney Foundation and American Society of Nephrology (ASN) Task Force recommended calculation based on the Chronic Kidney Disease Epidemiology Collaboration (CKD-EPI) equation refit without adjustment for race.   • Total Cholesterol 05/06/2022 164  0 - 200 mg/dL Final   • Triglycerides 05/06/2022 238 (H)  0 - 150 mg/dL Final   • HDL Cholesterol 05/06/2022 30 (L)  40 - 60 mg/dL Final   • LDL Cholesterol  05/06/2022 93  0 - 100 mg/dL Final   • VLDL Cholesterol 05/06/2022 41 (H)  5 - 40 mg/dL Final   • LDL/HDL Ratio 05/06/2022 2.88   Final   • TSH  05/06/2022 1.080  0.270 - 4.200 uIU/mL Final   • Free T4 05/06/2022 0.98  0.93 - 1.70 ng/dL Final   • WBC 05/06/2022 7.00  3.40 - 10.80 10*3/mm3 Final   • RBC 05/06/2022 5.57  4.14 - 5.80 10*6/mm3 Final   • Hemoglobin 05/06/2022 16.4  13.0 - 17.7 g/dL Final   • Hematocrit 05/06/2022 48.7  37.5 - 51.0 % Final   • MCV 05/06/2022 87.4  79.0 - 97.0 fL Final   • MCH 05/06/2022 29.4  26.6 - 33.0 pg Final   • MCHC 05/06/2022 33.7  31.5 - 35.7 g/dL Final   • RDW 05/06/2022 13.1  12.3 - 15.4 % Final   • RDW-SD 05/06/2022 42.2  37.0 - 54.0 fl Final   • MPV 05/06/2022 9.9  6.0 - 12.0 fL Final   • Platelets 05/06/2022 284  140 - 450 10*3/mm3 Final   • Neutrophil % 05/06/2022 66.7  42.7 - 76.0 % Final   • Lymphocyte % 05/06/2022 20.9  19.6 - 45.3 % Final   • Monocyte % 05/06/2022 9.1  5.0 - 12.0 % Final   • Eosinophil % 05/06/2022 1.7  0.3 - 6.2 % Final   • Basophil % 05/06/2022 1.3  0.0 - 1.5 % Final   • Immature Grans % 05/06/2022 0.3  0.0 - 0.5 % Final   • Neutrophils, Absolute 05/06/2022 4.67  1.70 - 7.00 10*3/mm3 Final   • Lymphocytes, Absolute 05/06/2022 1.46  0.70 - 3.10 10*3/mm3 Final   • Monocytes, Absolute 05/06/2022 0.64  0.10 - 0.90 10*3/mm3 Final   • Eosinophils, Absolute 05/06/2022 0.12  0.00 - 0.40 10*3/mm3 Final   • Basophils, Absolute 05/06/2022 0.09  0.00 - 0.20 10*3/mm3 Final   • Immature Grans, Absolute 05/06/2022 0.02  0.00 - 0.05 10*3/mm3 Final   • nRBC 05/06/2022 0.0  0.0 - 0.2 /100 WBC Final       Assessment & Plan   Problems Addressed this Visit        Mental Health    Marijuana dependence   Other Visit Diagnoses     Chronic post-traumatic stress disorder (PTSD)    -  Primary    Relevant Medications    sertraline (ZOLOFT) 100 MG tablet    prazosin (MINIPRESS) 5 MG capsule    OLANZapine (zyPREXA) 20 MG tablet    mirtazapine (REMERON) 45 MG tablet    busPIRone (BUSPAR) 30 MG tablet    VANDANA (generalized anxiety disorder)        Relevant Medications    sertraline (ZOLOFT) 100 MG tablet    OLANZapine (zyPREXA)  20 MG tablet    mirtazapine (REMERON) 45 MG tablet    busPIRone (BUSPAR) 30 MG tablet    MDD (major depressive disorder), recurrent, in full remission        Relevant Medications    sertraline (ZOLOFT) 100 MG tablet    OLANZapine (zyPREXA) 20 MG tablet    mirtazapine (REMERON) 45 MG tablet    busPIRone (BUSPAR) 30 MG tablet    Medication management          Diagnoses       Codes Comments    Chronic post-traumatic stress disorder (PTSD)    -  Primary ICD-10-CM: F43.12  ICD-9-CM: 309.81     VANDANA (generalized anxiety disorder)     ICD-10-CM: F41.1  ICD-9-CM: 300.02     MDD (major depressive disorder), recurrent, in full remission     ICD-10-CM: F33.42  ICD-9-CM: 296.36     Marijuana dependence     ICD-10-CM: F12.20  ICD-9-CM: 304.30     Medication management     ICD-10-CM: Z79.899  ICD-9-CM: V58.69         Social History     Tobacco Use   Smoking Status Every Day   • Packs/day: 1.00   • Years: 20.00   • Pack years: 20.00   • Types: Cigarettes   Smokeless Tobacco Never     LYNSEY reviewed and appropriate. Patient counseled on use of controlled substances.       -The benefits of a healthy diet and exercise were discussed with patient, especially the positive effects they have on mental health. Patient encouraged to consider lifestyle modification regarding  diet and exercise patterns to maximize results of mental health treatment.  -Reviewed previous available documentation  -Reviewed most recent available labs   -Lengthy discussion with patient on the possible side effects of antipsychotic medications including increased cholesterol, increased blood sugar, and possibility of weight gain.  Also discussed the need to monitor lab work associated with this.  The risk of muscle movement disorders with this class of medication was also discussed.  -I've explained to him that drugs of the SSRI class can have side effects such as weight gain, sexual dysfunction, insomnia, headache, nausea. These medications are generally  effective at alleviating symptoms of anxiety and/or depression. Let me know if significant side effects do occur.    -Encouraged to discontinue THC use.     Visit Diagnoses:    ICD-10-CM ICD-9-CM   1. Chronic post-traumatic stress disorder (PTSD)  F43.12 309.81   2. VANDANA (generalized anxiety disorder)  F41.1 300.02   3. MDD (major depressive disorder), recurrent, in full remission  F33.42 296.36   4. Marijuana dependence  F12.20 304.30   5. Medication management  Z79.899 V58.69         TREATMENT PLAN/GOALS: Continue supportive psychotherapy efforts and medications as indicated. Treatment and medication options discussed during today's visit. Patient acknowledged and verbally consented to continue with current treatment plan and was educated on the importance of compliance with treatment and follow-up appointments.    MEDICATION ISSUES:  Discussed medication options and treatment plan of prescribed medication as well as the risks, benefits, and side effects including potential falls, possible impaired driving and metabolic adversities among others. Patient is agreeable to call the office with any worsening of symptoms or onset of side effects. Patient is agreeable to call 911 or go to the nearest ER should he/she begin having SI/HI.     MEDS ORDERED DURING VISIT:  New Medications Ordered This Visit   Medications   • sertraline (ZOLOFT) 100 MG tablet     Sig: Take 1 tablet by mouth Daily.     Dispense:  30 tablet     Refill:  2   • prazosin (MINIPRESS) 5 MG capsule     Sig: Take 1 capsule by mouth every night at bedtime.     Dispense:  30 capsule     Refill:  2   • OLANZapine (zyPREXA) 20 MG tablet     Sig: Take 1 tablet by mouth Every Night.     Dispense:  30 tablet     Refill:  2   • mirtazapine (REMERON) 45 MG tablet     Sig: Take 1 tablet by mouth Every Night. monitor for serotonin syndrome as discussed     Dispense:  30 tablet     Refill:  2   • busPIRone (BUSPAR) 30 MG tablet     Sig: Take 1 tablet by mouth 2  (Two) Times a Day. Monitor for serotonin syndrome as discussed     Dispense:  60 tablet     Refill:  2       Return in about 3 months (around 7/5/2023).  -Continue Zoloft 100 mg tablet take 1 tablet by mouth daily for depression  -Continue prazosin 5 mg capsule take 1 capsule by mouth every night at bedtime  -Continue olanzapine 20 mg tablet take 1 tablet by mouth every night  -Continue mirtazapine 45 mg tablet take 1 tablet by mouth every night  -Continue buspirone 30 mg tablet take 1 tablet by mouth 2 times a day for anxiety     Prognosis: Guarded dependent on medication/follow up and treatment plan compliance.  Functionality: pt showing improvements in important areas of daily functioning.     Short-term goals: Patient will adhere to medication regimen and note continued improvement in symptoms over the next 3 months.   Long-term goals: Patient will be adherent to medication management and psychotherapy with continued improvement in symptoms over the next 6 months    I spent 30 minutes caring for Deni on this date of service. This time includes time spent by me in the following activities: preparing for the visit, obtaining and/or reviewing a separately obtained history, performing a medically appropriate examination and/or evaluation, counseling and educating the patient/family/caregiver, ordering medications, tests, or procedures and documenting information in the medical record      This document has been electronically signed by ROSALINE Elam   April 5, 2023 15:27 EDT    Part of this note may be an electronic transcription/translation of spoken language to printed text using the Dragon Dictation System.

## 2023-04-05 ENCOUNTER — OFFICE VISIT (OUTPATIENT)
Dept: PSYCHIATRY | Facility: CLINIC | Age: 53
End: 2023-04-05
Payer: MEDICARE

## 2023-04-05 VITALS
HEART RATE: 62 BPM | DIASTOLIC BLOOD PRESSURE: 80 MMHG | WEIGHT: 165.4 LBS | BODY MASS INDEX: 24.5 KG/M2 | HEIGHT: 69 IN | SYSTOLIC BLOOD PRESSURE: 122 MMHG

## 2023-04-05 DIAGNOSIS — F12.20 MARIJUANA DEPENDENCE: ICD-10-CM

## 2023-04-05 DIAGNOSIS — F43.12 CHRONIC POST-TRAUMATIC STRESS DISORDER (PTSD): Primary | ICD-10-CM

## 2023-04-05 DIAGNOSIS — Z79.899 MEDICATION MANAGEMENT: ICD-10-CM

## 2023-04-05 DIAGNOSIS — F33.42 MDD (MAJOR DEPRESSIVE DISORDER), RECURRENT, IN FULL REMISSION: ICD-10-CM

## 2023-04-05 DIAGNOSIS — F41.1 GAD (GENERALIZED ANXIETY DISORDER): ICD-10-CM

## 2023-04-05 PROCEDURE — 99214 OFFICE O/P EST MOD 30 MIN: CPT | Performed by: NURSE PRACTITIONER

## 2023-04-05 PROCEDURE — 1160F RVW MEDS BY RX/DR IN RCRD: CPT | Performed by: NURSE PRACTITIONER

## 2023-04-05 PROCEDURE — 1159F MED LIST DOCD IN RCRD: CPT | Performed by: NURSE PRACTITIONER

## 2023-04-05 RX ORDER — MIRTAZAPINE 45 MG/1
45 TABLET, FILM COATED ORAL NIGHTLY
Qty: 30 TABLET | Refills: 2 | Status: SHIPPED | OUTPATIENT
Start: 2023-04-05

## 2023-04-05 RX ORDER — BUSPIRONE HYDROCHLORIDE 30 MG/1
30 TABLET ORAL 2 TIMES DAILY
Qty: 60 TABLET | Refills: 2 | Status: SHIPPED | OUTPATIENT
Start: 2023-04-05

## 2023-04-05 RX ORDER — SERTRALINE HYDROCHLORIDE 100 MG/1
100 TABLET, FILM COATED ORAL DAILY
Qty: 30 TABLET | Refills: 2 | Status: SHIPPED | OUTPATIENT
Start: 2023-04-05

## 2023-04-05 RX ORDER — PRAZOSIN HYDROCHLORIDE 5 MG/1
5 CAPSULE ORAL
Qty: 30 CAPSULE | Refills: 2 | Status: SHIPPED | OUTPATIENT
Start: 2023-04-05

## 2023-04-05 RX ORDER — OLANZAPINE 20 MG/1
20 TABLET ORAL NIGHTLY
Qty: 30 TABLET | Refills: 2 | Status: SHIPPED | OUTPATIENT
Start: 2023-04-05

## 2023-07-31 ENCOUNTER — OFFICE VISIT (OUTPATIENT)
Dept: PSYCHIATRY | Facility: CLINIC | Age: 53
End: 2023-07-31
Payer: MEDICARE

## 2023-07-31 VITALS
DIASTOLIC BLOOD PRESSURE: 80 MMHG | SYSTOLIC BLOOD PRESSURE: 128 MMHG | WEIGHT: 160.6 LBS | HEIGHT: 69 IN | HEART RATE: 50 BPM | BODY MASS INDEX: 23.79 KG/M2

## 2023-07-31 DIAGNOSIS — Z79.899 MEDICATION MANAGEMENT: ICD-10-CM

## 2023-07-31 DIAGNOSIS — F43.12 CHRONIC POST-TRAUMATIC STRESS DISORDER (PTSD): Primary | ICD-10-CM

## 2023-07-31 DIAGNOSIS — F33.42 MDD (MAJOR DEPRESSIVE DISORDER), RECURRENT, IN FULL REMISSION: ICD-10-CM

## 2023-07-31 DIAGNOSIS — F41.1 GAD (GENERALIZED ANXIETY DISORDER): ICD-10-CM

## 2023-07-31 RX ORDER — OLANZAPINE 20 MG/1
20 TABLET ORAL NIGHTLY
Qty: 30 TABLET | Refills: 2 | Status: SHIPPED | OUTPATIENT
Start: 2023-07-31

## 2023-07-31 RX ORDER — BUSPIRONE HYDROCHLORIDE 30 MG/1
30 TABLET ORAL 2 TIMES DAILY
Qty: 60 TABLET | Refills: 2 | Status: SHIPPED | OUTPATIENT
Start: 2023-07-31

## 2023-07-31 RX ORDER — ATORVASTATIN CALCIUM 40 MG/1
TABLET, FILM COATED ORAL
COMMUNITY

## 2023-07-31 RX ORDER — LOSARTAN POTASSIUM 50 MG/1
1 TABLET ORAL DAILY
COMMUNITY
Start: 2023-07-21

## 2023-07-31 RX ORDER — SERTRALINE HYDROCHLORIDE 100 MG/1
100 TABLET, FILM COATED ORAL DAILY
Qty: 30 TABLET | Refills: 2 | Status: SHIPPED | OUTPATIENT
Start: 2023-07-31

## 2023-07-31 RX ORDER — EMPAGLIFLOZIN 10 MG/1
1 TABLET, FILM COATED ORAL DAILY
COMMUNITY
Start: 2023-07-21

## 2023-07-31 RX ORDER — MIRTAZAPINE 45 MG/1
45 TABLET, FILM COATED ORAL NIGHTLY
Qty: 30 TABLET | Refills: 2 | Status: SHIPPED | OUTPATIENT
Start: 2023-07-31

## 2023-07-31 RX ORDER — PRAZOSIN HYDROCHLORIDE 5 MG/1
5 CAPSULE ORAL
Qty: 30 CAPSULE | Refills: 2 | Status: SHIPPED | OUTPATIENT
Start: 2023-07-31

## 2023-10-27 DIAGNOSIS — F41.1 GAD (GENERALIZED ANXIETY DISORDER): ICD-10-CM

## 2023-10-27 DIAGNOSIS — F33.42 MDD (MAJOR DEPRESSIVE DISORDER), RECURRENT, IN FULL REMISSION: ICD-10-CM

## 2023-10-27 DIAGNOSIS — F43.12 CHRONIC POST-TRAUMATIC STRESS DISORDER (PTSD): ICD-10-CM

## 2023-10-27 RX ORDER — MIRTAZAPINE 45 MG/1
45 TABLET, FILM COATED ORAL
Qty: 30 TABLET | Refills: 2 | Status: SHIPPED | OUTPATIENT
Start: 2023-10-27

## 2023-10-30 DIAGNOSIS — F43.12 CHRONIC POST-TRAUMATIC STRESS DISORDER (PTSD): ICD-10-CM

## 2023-10-30 DIAGNOSIS — F41.1 GAD (GENERALIZED ANXIETY DISORDER): ICD-10-CM

## 2023-10-31 RX ORDER — SERTRALINE HYDROCHLORIDE 100 MG/1
100 TABLET, FILM COATED ORAL DAILY
Qty: 30 TABLET | Refills: 2 | Status: SHIPPED | OUTPATIENT
Start: 2023-10-31

## 2023-10-31 RX ORDER — PRAZOSIN HYDROCHLORIDE 5 MG/1
5 CAPSULE ORAL
Qty: 30 CAPSULE | Refills: 2 | Status: SHIPPED | OUTPATIENT
Start: 2023-10-31

## 2023-10-31 RX ORDER — OLANZAPINE 20 MG/1
20 TABLET ORAL NIGHTLY
Qty: 30 TABLET | Refills: 2 | Status: SHIPPED | OUTPATIENT
Start: 2023-10-31

## 2023-10-31 RX ORDER — BUSPIRONE HYDROCHLORIDE 30 MG/1
30 TABLET ORAL 2 TIMES DAILY
Qty: 60 TABLET | Refills: 2 | Status: SHIPPED | OUTPATIENT
Start: 2023-10-31

## 2023-12-11 NOTE — PROGRESS NOTES
Subjective   Deni Lopez is a 53 y.o. male who presents today for follow up    Chief Complaint:  Depression, anxiety, PTSD    History of Present Illness:   History of Present Illness  Today is a follow-up visit.     Medication compliance: patient is compliant with medications, denies SE.  Depression rated 4/10, with 10 being the worst.  Anxiety rated 4/10, with 10 being the worst.    Sleep is good, getting about 8 hours a night,  Nightmares: Denies  Appetite is good.  Hallucinations: Patient denies auditory hallucinations and visual hallucinations  Self-harm: Patient denies thoughts of self-harm.  Alcohol use: no  Drug use: no  Marijuana use: yes, smokes daily.     Chronic health issues, no acute physical or medical issues today.    Details: He is under care of neurologist, Dr. Villela, has f/u 12/20/23.  He states he is doing well overall on current medications.       The following portions of the patient's history were reviewed and updated as appropriate: allergies, current medications, past family history, past medical history, past social history, past surgical history and problem list.      Past Medical History:  Past Medical History:   Diagnosis Date    Anxiety     Depression     GERD (gastroesophageal reflux disease)     Hepatitis C     Hypertension     Seizure        Social History:  Social History     Socioeconomic History    Marital status:    Tobacco Use    Smoking status: Every Day     Packs/day: 1.00     Years: 20.00     Additional pack years: 0.00     Total pack years: 20.00     Types: Cigarettes    Smokeless tobacco: Never   Vaping Use    Vaping Use: Never used   Substance and Sexual Activity    Alcohol use: No    Drug use: No    Sexual activity: Yes     Partners: Female       Family History:  Family History   Problem Relation Age of Onset    Drug abuse Father        Past Surgical History:  History reviewed. No pertinent surgical history.    Problem List:  Patient Active Problem List   Diagnosis  "   Marijuana dependence       Allergy:   Allergies   Allergen Reactions    Codeine Sulfate Hives    Penicillins Hives        Current Medications:   Current Outpatient Medications   Medication Sig Dispense Refill    amLODIPine (NORVASC) 10 MG tablet       Aptiom 800 MG tablet tablet Take 1 tablet by mouth Daily.      aspirin 81 MG EC tablet Take 1 tablet by mouth Daily.      atorvastatin (LIPITOR) 40 MG tablet TAKE 1 TABLET (40 MG TOTAL) BY MOUTH EVERY NIGHT.      busPIRone (BUSPAR) 30 MG tablet Take 1 tablet by mouth 2 (Two) Times a Day. Monitor for serotonin syndrome as discussed 60 tablet 2    ibuprofen (ADVIL,MOTRIN) 600 MG tablet   0    Jardiance 10 MG tablet tablet Take 1 tablet by mouth Daily.      lisinopril (PRINIVIL,ZESTRIL) 40 MG tablet       losartan (COZAAR) 50 MG tablet Take 1 tablet by mouth Daily.      metoprolol tartrate (LOPRESSOR) 100 MG tablet       mirtazapine (REMERON) 45 MG tablet Take 1 tablet by mouth every night at bedtime. 30 tablet 2    Nayzilam 5 MG/0.1ML solution USE 1 SPRAY BY NOSTRIL      OLANZapine (zyPREXA) 20 MG tablet Take 1 tablet by mouth Every Night. 30 tablet 2    pantoprazole (PROTONIX) 40 MG EC tablet Daily.      potassium chloride 10 MEQ CR tablet Take 1 tablet by mouth Daily.      prazosin (MINIPRESS) 5 MG capsule Take 1 capsule by mouth every night at bedtime. 30 capsule 2    sertraline (ZOLOFT) 100 MG tablet Take 1 tablet by mouth Daily. 30 tablet 2     No current facility-administered medications for this visit.       Review of Symptoms:    Review of Systems   Psychiatric/Behavioral:  Positive for self-injury, sleep disturbance, suicidal ideas and depressed mood. Negative for agitation, dysphoric mood and hallucinations. The patient is nervous/anxious.    All other systems reviewed and are negative.      Objective   Physical Exam:   Blood pressure 118/70, pulse 56, height 175.3 cm (69.02\"), weight 72.6 kg (160 lb), SpO2 100%.  Body mass index is 23.62 " kg/m².    12/18/23    MENTAL STATUS EXAM   General Appearance:  Cleanly groomed and dressed  Eye Contact:  Good eye contact  Attitude:  Cooperative  Motor Activity:  Normal gait, posture  Speech:  Normal rate, tone, volume  Language:  Spontaneous  Mood and affect:  Normal, pleasant  Hopelessness:  Denies  Thought Process:  Goal-directed and linear  Associations/ Thought Content:  No delusions  Hallucinations:  None  Suicidal Ideations:  Not present  Homicidal Ideation:  Not present  Sensorium:  Alert  Orientation:  Person, place, time and situation  Insight:  Fair  Judgement:  Fair  Reliability:  Fair  Impulse Control:  Fair      PHQ-Score Total:  PHQ-9 Total Score:      Lab Results:   No visits with results within 1 Month(s) from this visit.   Latest known visit with results is:   Office Visit on 05/06/2022   Component Date Value Ref Range Status    Glucose 05/06/2022 105 (H)  65 - 99 mg/dL Final    BUN 05/06/2022 9  6 - 20 mg/dL Final    Creatinine 05/06/2022 1.26  0.76 - 1.27 mg/dL Final    Sodium 05/06/2022 137  136 - 145 mmol/L Final    Potassium 05/06/2022 2.8 (L)  3.5 - 5.2 mmol/L Final    Chloride 05/06/2022 99  98 - 107 mmol/L Final    CO2 05/06/2022 27.5  22.0 - 29.0 mmol/L Final    Calcium 05/06/2022 8.9  8.6 - 10.5 mg/dL Final    Total Protein 05/06/2022 7.0  6.0 - 8.5 g/dL Final    Albumin 05/06/2022 4.20  3.50 - 5.20 g/dL Final    ALT (SGPT) 05/06/2022 30  1 - 41 U/L Final    AST (SGOT) 05/06/2022 35  1 - 40 U/L Final    Alkaline Phosphatase 05/06/2022 88  39 - 117 U/L Final    Total Bilirubin 05/06/2022 0.3  0.0 - 1.2 mg/dL Final    Globulin 05/06/2022 2.8  gm/dL Final    A/G Ratio 05/06/2022 1.5  g/dL Final    BUN/Creatinine Ratio 05/06/2022 7.1  7.0 - 25.0 Final    Anion Gap 05/06/2022 10.5  5.0 - 15.0 mmol/L Final    eGFR 05/06/2022 68.2  >60.0 mL/min/1.73 Final    National Kidney Foundation and American Society of Nephrology (ASN) Task Force recommended calculation based on the Chronic Kidney  Disease Epidemiology Collaboration (CKD-EPI) equation refit without adjustment for race.    Total Cholesterol 05/06/2022 164  0 - 200 mg/dL Final    Triglycerides 05/06/2022 238 (H)  0 - 150 mg/dL Final    HDL Cholesterol 05/06/2022 30 (L)  40 - 60 mg/dL Final    LDL Cholesterol  05/06/2022 93  0 - 100 mg/dL Final    VLDL Cholesterol 05/06/2022 41 (H)  5 - 40 mg/dL Final    LDL/HDL Ratio 05/06/2022 2.88   Final    TSH 05/06/2022 1.080  0.270 - 4.200 uIU/mL Final    Free T4 05/06/2022 0.98  0.93 - 1.70 ng/dL Final    WBC 05/06/2022 7.00  3.40 - 10.80 10*3/mm3 Final    RBC 05/06/2022 5.57  4.14 - 5.80 10*6/mm3 Final    Hemoglobin 05/06/2022 16.4  13.0 - 17.7 g/dL Final    Hematocrit 05/06/2022 48.7  37.5 - 51.0 % Final    MCV 05/06/2022 87.4  79.0 - 97.0 fL Final    MCH 05/06/2022 29.4  26.6 - 33.0 pg Final    MCHC 05/06/2022 33.7  31.5 - 35.7 g/dL Final    RDW 05/06/2022 13.1  12.3 - 15.4 % Final    RDW-SD 05/06/2022 42.2  37.0 - 54.0 fl Final    MPV 05/06/2022 9.9  6.0 - 12.0 fL Final    Platelets 05/06/2022 284  140 - 450 10*3/mm3 Final    Neutrophil % 05/06/2022 66.7  42.7 - 76.0 % Final    Lymphocyte % 05/06/2022 20.9  19.6 - 45.3 % Final    Monocyte % 05/06/2022 9.1  5.0 - 12.0 % Final    Eosinophil % 05/06/2022 1.7  0.3 - 6.2 % Final    Basophil % 05/06/2022 1.3  0.0 - 1.5 % Final    Immature Grans % 05/06/2022 0.3  0.0 - 0.5 % Final    Neutrophils, Absolute 05/06/2022 4.67  1.70 - 7.00 10*3/mm3 Final    Lymphocytes, Absolute 05/06/2022 1.46  0.70 - 3.10 10*3/mm3 Final    Monocytes, Absolute 05/06/2022 0.64  0.10 - 0.90 10*3/mm3 Final    Eosinophils, Absolute 05/06/2022 0.12  0.00 - 0.40 10*3/mm3 Final    Basophils, Absolute 05/06/2022 0.09  0.00 - 0.20 10*3/mm3 Final    Immature Grans, Absolute 05/06/2022 0.02  0.00 - 0.05 10*3/mm3 Final    nRBC 05/06/2022 0.0  0.0 - 0.2 /100 WBC Final       Assessment & Plan   Problems Addressed this Visit          Mental Health    Marijuana dependence     Other Visit  Diagnoses       Chronic post-traumatic stress disorder (PTSD)    -  Primary    Relevant Medications    sertraline (ZOLOFT) 100 MG tablet    prazosin (MINIPRESS) 5 MG capsule    OLANZapine (zyPREXA) 20 MG tablet    mirtazapine (REMERON) 45 MG tablet    busPIRone (BUSPAR) 30 MG tablet    VANDANA (generalized anxiety disorder)        Relevant Medications    sertraline (ZOLOFT) 100 MG tablet    OLANZapine (zyPREXA) 20 MG tablet    mirtazapine (REMERON) 45 MG tablet    busPIRone (BUSPAR) 30 MG tablet    MDD (major depressive disorder), recurrent, in full remission        Relevant Medications    sertraline (ZOLOFT) 100 MG tablet    OLANZapine (zyPREXA) 20 MG tablet    mirtazapine (REMERON) 45 MG tablet    busPIRone (BUSPAR) 30 MG tablet    Medication management              Diagnoses         Codes Comments    Chronic post-traumatic stress disorder (PTSD)    -  Primary ICD-10-CM: F43.12  ICD-9-CM: 309.81     VANDANA (generalized anxiety disorder)     ICD-10-CM: F41.1  ICD-9-CM: 300.02     MDD (major depressive disorder), recurrent, in full remission     ICD-10-CM: F33.42  ICD-9-CM: 296.36     Medication management     ICD-10-CM: Z79.899  ICD-9-CM: V58.69     Marijuana dependence     ICD-10-CM: F12.20  ICD-9-CM: 304.30           Social History     Tobacco Use   Smoking Status Every Day    Packs/day: 1.00    Years: 20.00    Additional pack years: 0.00    Total pack years: 20.00    Types: Cigarettes   Smokeless Tobacco Never     LYNSEY reviewed and appropriate. Patient counseled on use of controlled substances.       -The benefits of a healthy diet and exercise were discussed with patient, especially the positive effects they have on mental health. Patient encouraged to consider lifestyle modification regarding  diet and exercise patterns to maximize results of mental health treatment.  -Reviewed previous available documentation  -Reviewed most recent available labs   -Lengthy discussion with patient on the possible side effects of  antipsychotic medications including increased cholesterol, increased blood sugar, and possibility of weight gain.  Also discussed the need to monitor lab work associated with this.  The risk of muscle movement disorders with this class of medication was also discussed.  -I've explained to him that drugs of the SSRI class can have side effects such as weight gain, sexual dysfunction, insomnia, headache, nausea. These medications are generally effective at alleviating symptoms of anxiety and/or depression. Let me know if significant side effects do occur.    -Encouraged to discontinue THC use.     Visit Diagnoses:    ICD-10-CM ICD-9-CM   1. Chronic post-traumatic stress disorder (PTSD)  F43.12 309.81   2. VANDANA (generalized anxiety disorder)  F41.1 300.02   3. MDD (major depressive disorder), recurrent, in full remission  F33.42 296.36   4. Medication management  Z79.899 V58.69   5. Marijuana dependence  F12.20 304.30           TREATMENT PLAN/GOALS: Continue supportive psychotherapy efforts and medications as indicated. Treatment and medication options discussed during today's visit. Patient acknowledged and verbally consented to continue with current treatment plan and was educated on the importance of compliance with treatment and follow-up appointments.    MEDICATION ISSUES:  Discussed medication options and treatment plan of prescribed medication as well as the risks, benefits, and side effects including potential falls, possible impaired driving and metabolic adversities among others. Patient is agreeable to call the office with any worsening of symptoms or onset of side effects. Patient is agreeable to call 911 or go to the nearest ER should he/she begin having SI/HI.     MEDS ORDERED DURING VISIT:  New Medications Ordered This Visit   Medications    sertraline (ZOLOFT) 100 MG tablet     Sig: Take 1 tablet by mouth Daily.     Dispense:  30 tablet     Refill:  2    prazosin (MINIPRESS) 5 MG capsule     Sig: Take 1  capsule by mouth every night at bedtime.     Dispense:  30 capsule     Refill:  2    OLANZapine (zyPREXA) 20 MG tablet     Sig: Take 1 tablet by mouth Every Night.     Dispense:  30 tablet     Refill:  2    mirtazapine (REMERON) 45 MG tablet     Sig: Take 1 tablet by mouth every night at bedtime.     Dispense:  30 tablet     Refill:  2    busPIRone (BUSPAR) 30 MG tablet     Sig: Take 1 tablet by mouth 2 (Two) Times a Day. Monitor for serotonin syndrome as discussed     Dispense:  60 tablet     Refill:  2       Return in about 3 months (around 3/18/2024).  -Continue Zoloft 100 mg tablet take 1 tablet by mouth daily for depression  -Continue prazosin 5 mg capsule take 1 capsule by mouth every night at bedtime  -Continue olanzapine 20 mg tablet take 1 tablet by mouth every night  -Continue mirtazapine 45 mg tablet take 1 tablet by mouth every night  -Continue buspirone 30 mg tablet take 1 tablet by mouth 2 times a day for anxiety     Prognosis: Guarded dependent on medication/follow up and treatment plan compliance.  Functionality: pt showing improvements in important areas of daily functioning.     Short-term goals: Patient will adhere to medication regimen and note continued improvement in symptoms over the next 3 months.   Long-term goals: Patient will be adherent to medication management and psychotherapy with continued improvement in symptoms over the next 6 months    I spent 30 minutes caring for Deni on this date of service. This time includes time spent by me in the following activities: preparing for the visit, obtaining and/or reviewing a separately obtained history, performing a medically appropriate examination and/or evaluation, counseling and educating the patient/family/caregiver, ordering medications, tests, or procedures, and documenting information in the medical record          This document has been electronically signed by ROSALINE Elam   December 18, 2023 16:44 EST    Part of this  note may be an electronic transcription/translation of spoken language to printed text using the Dragon Dictation System.

## 2023-12-18 ENCOUNTER — OFFICE VISIT (OUTPATIENT)
Dept: PSYCHIATRY | Facility: CLINIC | Age: 53
End: 2023-12-18
Payer: MEDICARE

## 2023-12-18 VITALS
HEART RATE: 56 BPM | HEIGHT: 69 IN | WEIGHT: 160 LBS | BODY MASS INDEX: 23.7 KG/M2 | SYSTOLIC BLOOD PRESSURE: 118 MMHG | OXYGEN SATURATION: 100 % | DIASTOLIC BLOOD PRESSURE: 70 MMHG

## 2023-12-18 DIAGNOSIS — Z79.899 MEDICATION MANAGEMENT: ICD-10-CM

## 2023-12-18 DIAGNOSIS — F43.12 CHRONIC POST-TRAUMATIC STRESS DISORDER (PTSD): Primary | ICD-10-CM

## 2023-12-18 DIAGNOSIS — F41.1 GAD (GENERALIZED ANXIETY DISORDER): ICD-10-CM

## 2023-12-18 DIAGNOSIS — F12.20 MARIJUANA DEPENDENCE: ICD-10-CM

## 2023-12-18 DIAGNOSIS — F33.42 MDD (MAJOR DEPRESSIVE DISORDER), RECURRENT, IN FULL REMISSION: ICD-10-CM

## 2023-12-18 RX ORDER — PRAZOSIN HYDROCHLORIDE 5 MG/1
5 CAPSULE ORAL
Qty: 30 CAPSULE | Refills: 2 | Status: SHIPPED | OUTPATIENT
Start: 2023-12-18

## 2023-12-18 RX ORDER — MIRTAZAPINE 45 MG/1
45 TABLET, FILM COATED ORAL
Qty: 30 TABLET | Refills: 2 | Status: SHIPPED | OUTPATIENT
Start: 2023-12-18

## 2023-12-18 RX ORDER — OLANZAPINE 20 MG/1
20 TABLET ORAL NIGHTLY
Qty: 30 TABLET | Refills: 2 | Status: SHIPPED | OUTPATIENT
Start: 2023-12-18

## 2023-12-18 RX ORDER — SERTRALINE HYDROCHLORIDE 100 MG/1
100 TABLET, FILM COATED ORAL DAILY
Qty: 30 TABLET | Refills: 2 | Status: SHIPPED | OUTPATIENT
Start: 2023-12-18

## 2023-12-18 RX ORDER — ASPIRIN 81 MG/1
81 TABLET ORAL DAILY
COMMUNITY
Start: 2023-10-31 | End: 2024-10-30

## 2023-12-18 RX ORDER — BUSPIRONE HYDROCHLORIDE 30 MG/1
30 TABLET ORAL 2 TIMES DAILY
Qty: 60 TABLET | Refills: 2 | Status: SHIPPED | OUTPATIENT
Start: 2023-12-18

## 2024-03-13 NOTE — PROGRESS NOTES
Subjective   Deni Lopez is a 53 y.o. male who presents today for follow up    Chief Complaint:  Depression, anxiety, PTSD    History of Present Illness:   History of Present Illness  Today is a follow-up visit.     Medication compliance: patient is compliant with medications, denies SE.  Depression rated 4/10, with 10 being the worst.  Anxiety rated 6/10, with 10 being the worst.    Sleep is good, getting about 8 hours a night,  Nightmares: Denies  Appetite is good.  Hallucinations: Patient denies auditory hallucinations and visual hallucinations  Self-harm: Patient denies thoughts of self-harm.  Alcohol use: no  Drug use: no  Marijuana use: yes, continues to smoke daily     Chronic health issues, no acute physical or medical issues today.    Details: He states things are going pretty good. He continues to see Dr. Villela, neurologist.         The following portions of the patient's history were reviewed and updated as appropriate: allergies, current medications, past family history, past medical history, past social history, past surgical history and problem list.      Past Medical History:  Past Medical History:   Diagnosis Date    Anxiety     Depression     GERD (gastroesophageal reflux disease)     Hepatitis C     Hypertension     Seizure        Social History:  Social History     Socioeconomic History    Marital status:    Tobacco Use    Smoking status: Every Day     Current packs/day: 1.00     Average packs/day: 1 pack/day for 20.0 years (20.0 ttl pk-yrs)     Types: Cigarettes    Smokeless tobacco: Never   Vaping Use    Vaping status: Never Used   Substance and Sexual Activity    Alcohol use: No    Drug use: No    Sexual activity: Yes     Partners: Female       Family History:  Family History   Problem Relation Age of Onset    Drug abuse Father        Past Surgical History:  History reviewed. No pertinent surgical history.    Problem List:  Patient Active Problem List   Diagnosis    Marijuana dependence  "      Allergy:   Allergies   Allergen Reactions    Codeine Sulfate Hives    Penicillins Hives        Current Medications:   Current Outpatient Medications   Medication Sig Dispense Refill    amLODIPine (NORVASC) 10 MG tablet       Aptiom 800 MG tablet tablet Take 1 tablet by mouth Daily.      aspirin 81 MG EC tablet Take 1 tablet by mouth Daily.      atorvastatin (LIPITOR) 40 MG tablet TAKE 1 TABLET (40 MG TOTAL) BY MOUTH EVERY NIGHT.      busPIRone (BUSPAR) 30 MG tablet Take 1 tablet by mouth 2 (Two) Times a Day. Monitor for serotonin syndrome as discussed 60 tablet 2    ibuprofen (ADVIL,MOTRIN) 600 MG tablet   0    Jardiance 10 MG tablet tablet Take 1 tablet by mouth Daily.      lisinopril (PRINIVIL,ZESTRIL) 40 MG tablet       losartan (COZAAR) 50 MG tablet Take 1 tablet by mouth Daily.      metoprolol tartrate (LOPRESSOR) 100 MG tablet       mirtazapine (REMERON) 45 MG tablet Take 1 tablet by mouth every night at bedtime. 30 tablet 2    Nayzilam 5 MG/0.1ML solution USE 1 SPRAY BY NOSTRIL      OLANZapine (zyPREXA) 20 MG tablet Take 1 tablet by mouth Every Night. 30 tablet 2    pantoprazole (PROTONIX) 40 MG EC tablet Daily.      potassium chloride 10 MEQ CR tablet Take 1 tablet by mouth Daily.      prazosin (MINIPRESS) 5 MG capsule Take 1 capsule by mouth every night at bedtime. 30 capsule 2    sertraline (ZOLOFT) 100 MG tablet Take 1 tablet by mouth Daily. 30 tablet 2     No current facility-administered medications for this visit.       Review of Symptoms:    Review of Systems   Psychiatric/Behavioral:  Positive for depressed mood. Negative for dysphoric mood, hallucinations, self-injury, sleep disturbance, suicidal ideas and stress. The patient is nervous/anxious.    All other systems reviewed and are negative.      Objective   Physical Exam:   Blood pressure 148/88, pulse 51, height 175.3 cm (69.02\"), weight 75.5 kg (166 lb 6.4 oz), SpO2 99%.  Body mass index is 24.56 kg/m².    3/18/24    MENTAL STATUS EXAM "   General Appearance:  Cleanly groomed and dressed  Eye Contact:  Good eye contact  Attitude:  Cooperative  Motor Activity:  Normal gait, posture  Speech:  Normal rate, tone, volume  Language:  Spontaneous  Mood and affect:  Normal, pleasant  Hopelessness:  Denies  Thought Process:  Goal-directed and linear  Associations/ Thought Content:  No delusions  Hallucinations:  None  Suicidal Ideations:  Not present  Homicidal Ideation:  Not present  Sensorium:  Alert  Orientation:  Person, place, time and situation  Insight:  Fair  Judgement:  Fair  Reliability:  Fair  Impulse Control:  Fair      PHQ-Score Total:  PHQ-9 Total Score: 0    Lab Results:   No visits with results within 1 Month(s) from this visit.   Latest known visit with results is:   Office Visit on 05/06/2022   Component Date Value Ref Range Status    Glucose 05/06/2022 105 (H)  65 - 99 mg/dL Final    BUN 05/06/2022 9  6 - 20 mg/dL Final    Creatinine 05/06/2022 1.26  0.76 - 1.27 mg/dL Final    Sodium 05/06/2022 137  136 - 145 mmol/L Final    Potassium 05/06/2022 2.8 (L)  3.5 - 5.2 mmol/L Final    Chloride 05/06/2022 99  98 - 107 mmol/L Final    CO2 05/06/2022 27.5  22.0 - 29.0 mmol/L Final    Calcium 05/06/2022 8.9  8.6 - 10.5 mg/dL Final    Total Protein 05/06/2022 7.0  6.0 - 8.5 g/dL Final    Albumin 05/06/2022 4.20  3.50 - 5.20 g/dL Final    ALT (SGPT) 05/06/2022 30  1 - 41 U/L Final    AST (SGOT) 05/06/2022 35  1 - 40 U/L Final    Alkaline Phosphatase 05/06/2022 88  39 - 117 U/L Final    Total Bilirubin 05/06/2022 0.3  0.0 - 1.2 mg/dL Final    Globulin 05/06/2022 2.8  gm/dL Final    A/G Ratio 05/06/2022 1.5  g/dL Final    BUN/Creatinine Ratio 05/06/2022 7.1  7.0 - 25.0 Final    Anion Gap 05/06/2022 10.5  5.0 - 15.0 mmol/L Final    eGFR 05/06/2022 68.2  >60.0 mL/min/1.73 Final    National Kidney Foundation and American Society of Nephrology (ASN) Task Force recommended calculation based on the Chronic Kidney Disease Epidemiology Collaboration (CKD-EPI)  equation refit without adjustment for race.    Total Cholesterol 05/06/2022 164  0 - 200 mg/dL Final    Triglycerides 05/06/2022 238 (H)  0 - 150 mg/dL Final    HDL Cholesterol 05/06/2022 30 (L)  40 - 60 mg/dL Final    LDL Cholesterol  05/06/2022 93  0 - 100 mg/dL Final    VLDL Cholesterol 05/06/2022 41 (H)  5 - 40 mg/dL Final    LDL/HDL Ratio 05/06/2022 2.88   Final    TSH 05/06/2022 1.080  0.270 - 4.200 uIU/mL Final    Free T4 05/06/2022 0.98  0.93 - 1.70 ng/dL Final    WBC 05/06/2022 7.00  3.40 - 10.80 10*3/mm3 Final    RBC 05/06/2022 5.57  4.14 - 5.80 10*6/mm3 Final    Hemoglobin 05/06/2022 16.4  13.0 - 17.7 g/dL Final    Hematocrit 05/06/2022 48.7  37.5 - 51.0 % Final    MCV 05/06/2022 87.4  79.0 - 97.0 fL Final    MCH 05/06/2022 29.4  26.6 - 33.0 pg Final    MCHC 05/06/2022 33.7  31.5 - 35.7 g/dL Final    RDW 05/06/2022 13.1  12.3 - 15.4 % Final    RDW-SD 05/06/2022 42.2  37.0 - 54.0 fl Final    MPV 05/06/2022 9.9  6.0 - 12.0 fL Final    Platelets 05/06/2022 284  140 - 450 10*3/mm3 Final    Neutrophil % 05/06/2022 66.7  42.7 - 76.0 % Final    Lymphocyte % 05/06/2022 20.9  19.6 - 45.3 % Final    Monocyte % 05/06/2022 9.1  5.0 - 12.0 % Final    Eosinophil % 05/06/2022 1.7  0.3 - 6.2 % Final    Basophil % 05/06/2022 1.3  0.0 - 1.5 % Final    Immature Grans % 05/06/2022 0.3  0.0 - 0.5 % Final    Neutrophils, Absolute 05/06/2022 4.67  1.70 - 7.00 10*3/mm3 Final    Lymphocytes, Absolute 05/06/2022 1.46  0.70 - 3.10 10*3/mm3 Final    Monocytes, Absolute 05/06/2022 0.64  0.10 - 0.90 10*3/mm3 Final    Eosinophils, Absolute 05/06/2022 0.12  0.00 - 0.40 10*3/mm3 Final    Basophils, Absolute 05/06/2022 0.09  0.00 - 0.20 10*3/mm3 Final    Immature Grans, Absolute 05/06/2022 0.02  0.00 - 0.05 10*3/mm3 Final    nRBC 05/06/2022 0.0  0.0 - 0.2 /100 WBC Final       Assessment & Plan   Problems Addressed this Visit    None  Visit Diagnoses       Chronic post-traumatic stress disorder (PTSD)    -  Primary    Relevant Medications     sertraline (ZOLOFT) 100 MG tablet    prazosin (MINIPRESS) 5 MG capsule    OLANZapine (zyPREXA) 20 MG tablet    mirtazapine (REMERON) 45 MG tablet    busPIRone (BUSPAR) 30 MG tablet    VANDANA (generalized anxiety disorder)        Relevant Medications    sertraline (ZOLOFT) 100 MG tablet    OLANZapine (zyPREXA) 20 MG tablet    mirtazapine (REMERON) 45 MG tablet    busPIRone (BUSPAR) 30 MG tablet    MDD (major depressive disorder), recurrent, in full remission        Relevant Medications    sertraline (ZOLOFT) 100 MG tablet    OLANZapine (zyPREXA) 20 MG tablet    mirtazapine (REMERON) 45 MG tablet    busPIRone (BUSPAR) 30 MG tablet    Medication management              Diagnoses         Codes Comments    Chronic post-traumatic stress disorder (PTSD)    -  Primary ICD-10-CM: F43.12  ICD-9-CM: 309.81     VANDANA (generalized anxiety disorder)     ICD-10-CM: F41.1  ICD-9-CM: 300.02     MDD (major depressive disorder), recurrent, in full remission     ICD-10-CM: F33.42  ICD-9-CM: 296.36     Medication management     ICD-10-CM: Z79.899  ICD-9-CM: V58.69           Social History     Tobacco Use   Smoking Status Every Day    Current packs/day: 1.00    Average packs/day: 1 pack/day for 20.0 years (20.0 ttl pk-yrs)    Types: Cigarettes   Smokeless Tobacco Never     LYNSEY reviewed and appropriate. Patient counseled on use of controlled substances.       -The benefits of a healthy diet and exercise were discussed with patient, especially the positive effects they have on mental health. Patient encouraged to consider lifestyle modification regarding  diet and exercise patterns to maximize results of mental health treatment.  -Reviewed previous available documentation  -Reviewed most recent available labs   -Lengthy discussion with patient on the possible side effects of antipsychotic medications including increased cholesterol, increased blood sugar, and possibility of weight gain.  Also discussed the need to monitor lab work  associated with this.  The risk of muscle movement disorders with this class of medication was also discussed.  -I've explained to him that drugs of the SSRI class can have side effects such as weight gain, sexual dysfunction, insomnia, headache, nausea. These medications are generally effective at alleviating symptoms of anxiety and/or depression. Let me know if significant side effects do occur.    -Encouraged to discontinue THC use.     Visit Diagnoses:    ICD-10-CM ICD-9-CM   1. Chronic post-traumatic stress disorder (PTSD)  F43.12 309.81   2. VANDANA (generalized anxiety disorder)  F41.1 300.02   3. MDD (major depressive disorder), recurrent, in full remission  F33.42 296.36   4. Medication management  Z79.899 V58.69       TREATMENT PLAN/GOALS: Continue supportive psychotherapy efforts and medications as indicated. Treatment and medication options discussed during today's visit. Patient acknowledged and verbally consented to continue with current treatment plan and was educated on the importance of compliance with treatment and follow-up appointments.    MEDICATION ISSUES:  Discussed medication options and treatment plan of prescribed medication as well as the risks, benefits, and side effects including potential falls, possible impaired driving and metabolic adversities among others. Patient is agreeable to call the office with any worsening of symptoms or onset of side effects. Patient is agreeable to call 911 or go to the nearest ER should he/she begin having SI/HI.     MEDS ORDERED DURING VISIT:  New Medications Ordered This Visit   Medications    sertraline (ZOLOFT) 100 MG tablet     Sig: Take 1 tablet by mouth Daily.     Dispense:  30 tablet     Refill:  2    prazosin (MINIPRESS) 5 MG capsule     Sig: Take 1 capsule by mouth every night at bedtime.     Dispense:  30 capsule     Refill:  2    OLANZapine (zyPREXA) 20 MG tablet     Sig: Take 1 tablet by mouth Every Night.     Dispense:  30 tablet     Refill:  2     mirtazapine (REMERON) 45 MG tablet     Sig: Take 1 tablet by mouth every night at bedtime.     Dispense:  30 tablet     Refill:  2    busPIRone (BUSPAR) 30 MG tablet     Sig: Take 1 tablet by mouth 2 (Two) Times a Day. Monitor for serotonin syndrome as discussed     Dispense:  60 tablet     Refill:  2       Return in about 3 months (around 6/18/2024).  -Continue Zoloft 100 mg tablet take 1 tablet by mouth daily for depression  -Continue prazosin 5 mg capsule take 1 capsule by mouth every night at bedtime  -Continue olanzapine 20 mg tablet take 1 tablet by mouth every night  -Continue mirtazapine 45 mg tablet take 1 tablet by mouth every night  -Continue buspirone 30 mg tablet take 1 tablet by mouth 2 times a day for anxiety     Prognosis: Guarded dependent on medication/follow up and treatment plan compliance.  Functionality: pt showing improvements in important areas of daily functioning.     Short-term goals: Patient will adhere to medication regimen and note continued improvement in symptoms over the next 3 months.   Long-term goals: Patient will be adherent to medication management and psychotherapy with continued improvement in symptoms over the next 6 months    I spent 30 minutes caring for Deni on this date of service. This time includes time spent by me in the following activities: preparing for the visit, obtaining and/or reviewing a separately obtained history, performing a medically appropriate examination and/or evaluation, counseling and educating the patient/family/caregiver, ordering medications, tests, or procedures, and documenting information in the medical record            This document has been electronically signed by ROSALINE Elam   March 18, 2024 12:37 EDT    Part of this note may be an electronic transcription/translation of spoken language to printed text using the Dragon Dictation System.

## 2024-03-18 ENCOUNTER — OFFICE VISIT (OUTPATIENT)
Dept: PSYCHIATRY | Facility: CLINIC | Age: 54
End: 2024-03-18
Payer: MEDICARE

## 2024-03-18 VITALS
DIASTOLIC BLOOD PRESSURE: 88 MMHG | HEART RATE: 51 BPM | OXYGEN SATURATION: 99 % | WEIGHT: 166.4 LBS | HEIGHT: 69 IN | SYSTOLIC BLOOD PRESSURE: 148 MMHG | BODY MASS INDEX: 24.65 KG/M2

## 2024-03-18 DIAGNOSIS — Z79.899 MEDICATION MANAGEMENT: ICD-10-CM

## 2024-03-18 DIAGNOSIS — F43.12 CHRONIC POST-TRAUMATIC STRESS DISORDER (PTSD): Primary | ICD-10-CM

## 2024-03-18 DIAGNOSIS — F33.42 MDD (MAJOR DEPRESSIVE DISORDER), RECURRENT, IN FULL REMISSION: ICD-10-CM

## 2024-03-18 DIAGNOSIS — F41.1 GAD (GENERALIZED ANXIETY DISORDER): ICD-10-CM

## 2024-03-18 RX ORDER — SERTRALINE HYDROCHLORIDE 100 MG/1
100 TABLET, FILM COATED ORAL DAILY
Qty: 30 TABLET | Refills: 2 | Status: SHIPPED | OUTPATIENT
Start: 2024-03-18

## 2024-03-18 RX ORDER — MIRTAZAPINE 45 MG/1
45 TABLET, FILM COATED ORAL
Qty: 30 TABLET | Refills: 2 | Status: SHIPPED | OUTPATIENT
Start: 2024-03-18

## 2024-03-18 RX ORDER — PRAZOSIN HYDROCHLORIDE 5 MG/1
5 CAPSULE ORAL
Qty: 30 CAPSULE | Refills: 2 | Status: SHIPPED | OUTPATIENT
Start: 2024-03-18

## 2024-03-18 RX ORDER — BUSPIRONE HYDROCHLORIDE 30 MG/1
30 TABLET ORAL 2 TIMES DAILY
Qty: 60 TABLET | Refills: 2 | Status: SHIPPED | OUTPATIENT
Start: 2024-03-18

## 2024-03-18 RX ORDER — OLANZAPINE 20 MG/1
20 TABLET ORAL NIGHTLY
Qty: 30 TABLET | Refills: 2 | Status: SHIPPED | OUTPATIENT
Start: 2024-03-18

## 2024-06-13 NOTE — PROGRESS NOTES
"Subjective   Deni Lopez is a 54 y.o. male who presents today for follow up    Chief Complaint:  Depression, anxiety, PTSD    History of Present Illness:   History of Present Illness  Today is a follow-up visit.     Medication compliance: patient is compliant with medications, denies SE.  Depression rated 4/10, with 10 being the worst.  Anxiety rated 5/10, with 10 being the worst.    Sleep is good, getting about 8 hours a night,  Nightmares: Denies  Appetite is good.  Hallucinations: Patient denies auditory hallucinations and visual hallucinations  Self-harm: Patient denies thoughts of self-harm.  Alcohol use: no  Drug use: no  Marijuana use: yes, smokes almost daily     Chronic health issues, no acute physical or medical issues today.    Details: He continues to see Dr. Villela, neurologist, has f/u appt 7/25/24 . He reports things have been going \"pretty good\".          The following portions of the patient's history were reviewed and updated as appropriate: allergies, current medications, past family history, past medical history, past social history, past surgical history and problem list.      Past Medical History:  Past Medical History:   Diagnosis Date    Anxiety     Depression     GERD (gastroesophageal reflux disease)     Hepatitis C     Hypertension     Seizure        Social History:  Social History     Socioeconomic History    Marital status:    Tobacco Use    Smoking status: Every Day     Current packs/day: 1.00     Average packs/day: 1 pack/day for 20.0 years (20.0 ttl pk-yrs)     Types: Cigarettes    Smokeless tobacco: Never   Vaping Use    Vaping status: Never Used   Substance and Sexual Activity    Alcohol use: No    Drug use: No    Sexual activity: Yes     Partners: Female       Family History:  Family History   Problem Relation Age of Onset    Drug abuse Father        Past Surgical History:  History reviewed. No pertinent surgical history.    Problem List:  Patient Active Problem List " "  Diagnosis    Marijuana dependence       Allergy:   Allergies   Allergen Reactions    Codeine Sulfate Hives    Penicillins Hives        Current Medications:   Current Outpatient Medications   Medication Sig Dispense Refill    amLODIPine (NORVASC) 10 MG tablet       Aptiom 800 MG tablet tablet Take 1 tablet by mouth Daily.      aspirin 81 MG EC tablet Take 1 tablet by mouth Daily.      atorvastatin (LIPITOR) 40 MG tablet TAKE 1 TABLET (40 MG TOTAL) BY MOUTH EVERY NIGHT.      busPIRone (BUSPAR) 30 MG tablet Take 1 tablet by mouth 2 (Two) Times a Day. Monitor for serotonin syndrome as discussed 60 tablet 2    ibuprofen (ADVIL,MOTRIN) 600 MG tablet   0    Jardiance 10 MG tablet tablet Take 1 tablet by mouth Daily.      lisinopril (PRINIVIL,ZESTRIL) 40 MG tablet       losartan (COZAAR) 50 MG tablet Take 1 tablet by mouth Daily.      metoprolol tartrate (LOPRESSOR) 100 MG tablet       mirtazapine (REMERON) 45 MG tablet Take 1 tablet by mouth every night at bedtime. 30 tablet 2    Nayzilam 5 MG/0.1ML solution USE 1 SPRAY BY NOSTRIL      OLANZapine (zyPREXA) 20 MG tablet Take 1 tablet by mouth Every Night. 30 tablet 2    pantoprazole (PROTONIX) 40 MG EC tablet Daily.      potassium chloride 10 MEQ CR tablet Take 1 tablet by mouth Daily.      prazosin (MINIPRESS) 5 MG capsule Take 1 capsule by mouth every night at bedtime. 30 capsule 2    sertraline (ZOLOFT) 100 MG tablet Take 1 tablet by mouth Daily. 30 tablet 2     No current facility-administered medications for this visit.       Review of Symptoms:    Review of Systems   Psychiatric/Behavioral:  Positive for depressed mood. Negative for agitation, dysphoric mood, hallucinations, self-injury, sleep disturbance, suicidal ideas and stress. The patient is nervous/anxious.    All other systems reviewed and are negative.      Objective   Physical Exam:   Blood pressure 128/82, pulse 65, height 175.3 cm (69.02\"), weight 76.5 kg (168 lb 9.6 oz), SpO2 99%.  Body mass index is " 24.89 kg/m².    6/19/24    MENTAL STATUS EXAM   General Appearance:  Cleanly groomed and dressed  Eye Contact:  Good eye contact  Attitude:  Cooperative  Motor Activity:  Normal gait, posture  Speech:  Normal rate, tone, volume  Language:  Spontaneous  Mood and affect:  Normal, pleasant  Hopelessness:  Denies  Thought Process:  Goal-directed and linear  Associations/ Thought Content:  No delusions  Hallucinations:  None  Suicidal Ideations:  Not present  Homicidal Ideation:  Not present  Sensorium:  Alert  Orientation:  Person, place, time and situation  Insight:  Fair  Judgement:  Fair  Reliability:  Fair  Impulse Control:  Fair      PHQ-Score Total:  PHQ-9 Total Score: 1    Lab Results:   No visits with results within 1 Month(s) from this visit.   Latest known visit with results is:   Office Visit on 05/06/2022   Component Date Value Ref Range Status    Glucose 05/06/2022 105 (H)  65 - 99 mg/dL Final    BUN 05/06/2022 9  6 - 20 mg/dL Final    Creatinine 05/06/2022 1.26  0.76 - 1.27 mg/dL Final    Sodium 05/06/2022 137  136 - 145 mmol/L Final    Potassium 05/06/2022 2.8 (L)  3.5 - 5.2 mmol/L Final    Chloride 05/06/2022 99  98 - 107 mmol/L Final    CO2 05/06/2022 27.5  22.0 - 29.0 mmol/L Final    Calcium 05/06/2022 8.9  8.6 - 10.5 mg/dL Final    Total Protein 05/06/2022 7.0  6.0 - 8.5 g/dL Final    Albumin 05/06/2022 4.20  3.50 - 5.20 g/dL Final    ALT (SGPT) 05/06/2022 30  1 - 41 U/L Final    AST (SGOT) 05/06/2022 35  1 - 40 U/L Final    Alkaline Phosphatase 05/06/2022 88  39 - 117 U/L Final    Total Bilirubin 05/06/2022 0.3  0.0 - 1.2 mg/dL Final    Globulin 05/06/2022 2.8  gm/dL Final    A/G Ratio 05/06/2022 1.5  g/dL Final    BUN/Creatinine Ratio 05/06/2022 7.1  7.0 - 25.0 Final    Anion Gap 05/06/2022 10.5  5.0 - 15.0 mmol/L Final    eGFR 05/06/2022 68.2  >60.0 mL/min/1.73 Final    National Kidney Foundation and American Society of Nephrology (ASN) Task Force recommended calculation based on the Chronic Kidney  Disease Epidemiology Collaboration (CKD-EPI) equation refit without adjustment for race.    Total Cholesterol 05/06/2022 164  0 - 200 mg/dL Final    Triglycerides 05/06/2022 238 (H)  0 - 150 mg/dL Final    HDL Cholesterol 05/06/2022 30 (L)  40 - 60 mg/dL Final    LDL Cholesterol  05/06/2022 93  0 - 100 mg/dL Final    VLDL Cholesterol 05/06/2022 41 (H)  5 - 40 mg/dL Final    LDL/HDL Ratio 05/06/2022 2.88   Final    TSH 05/06/2022 1.080  0.270 - 4.200 uIU/mL Final    Free T4 05/06/2022 0.98  0.93 - 1.70 ng/dL Final    WBC 05/06/2022 7.00  3.40 - 10.80 10*3/mm3 Final    RBC 05/06/2022 5.57  4.14 - 5.80 10*6/mm3 Final    Hemoglobin 05/06/2022 16.4  13.0 - 17.7 g/dL Final    Hematocrit 05/06/2022 48.7  37.5 - 51.0 % Final    MCV 05/06/2022 87.4  79.0 - 97.0 fL Final    MCH 05/06/2022 29.4  26.6 - 33.0 pg Final    MCHC 05/06/2022 33.7  31.5 - 35.7 g/dL Final    RDW 05/06/2022 13.1  12.3 - 15.4 % Final    RDW-SD 05/06/2022 42.2  37.0 - 54.0 fl Final    MPV 05/06/2022 9.9  6.0 - 12.0 fL Final    Platelets 05/06/2022 284  140 - 450 10*3/mm3 Final    Neutrophil % 05/06/2022 66.7  42.7 - 76.0 % Final    Lymphocyte % 05/06/2022 20.9  19.6 - 45.3 % Final    Monocyte % 05/06/2022 9.1  5.0 - 12.0 % Final    Eosinophil % 05/06/2022 1.7  0.3 - 6.2 % Final    Basophil % 05/06/2022 1.3  0.0 - 1.5 % Final    Immature Grans % 05/06/2022 0.3  0.0 - 0.5 % Final    Neutrophils, Absolute 05/06/2022 4.67  1.70 - 7.00 10*3/mm3 Final    Lymphocytes, Absolute 05/06/2022 1.46  0.70 - 3.10 10*3/mm3 Final    Monocytes, Absolute 05/06/2022 0.64  0.10 - 0.90 10*3/mm3 Final    Eosinophils, Absolute 05/06/2022 0.12  0.00 - 0.40 10*3/mm3 Final    Basophils, Absolute 05/06/2022 0.09  0.00 - 0.20 10*3/mm3 Final    Immature Grans, Absolute 05/06/2022 0.02  0.00 - 0.05 10*3/mm3 Final    nRBC 05/06/2022 0.0  0.0 - 0.2 /100 WBC Final       Assessment & Plan   Problems Addressed this Visit    None  Visit Diagnoses       Chronic post-traumatic stress disorder  (PTSD)    -  Primary    Relevant Medications    sertraline (ZOLOFT) 100 MG tablet    prazosin (MINIPRESS) 5 MG capsule    OLANZapine (zyPREXA) 20 MG tablet    mirtazapine (REMERON) 45 MG tablet    busPIRone (BUSPAR) 30 MG tablet    VANDANA (generalized anxiety disorder)        Relevant Medications    sertraline (ZOLOFT) 100 MG tablet    OLANZapine (zyPREXA) 20 MG tablet    mirtazapine (REMERON) 45 MG tablet    busPIRone (BUSPAR) 30 MG tablet    MDD (major depressive disorder), recurrent, in full remission        Relevant Medications    sertraline (ZOLOFT) 100 MG tablet    OLANZapine (zyPREXA) 20 MG tablet    mirtazapine (REMERON) 45 MG tablet    busPIRone (BUSPAR) 30 MG tablet    Medication management              Diagnoses         Codes Comments    Chronic post-traumatic stress disorder (PTSD)    -  Primary ICD-10-CM: F43.12  ICD-9-CM: 309.81     VANDANA (generalized anxiety disorder)     ICD-10-CM: F41.1  ICD-9-CM: 300.02     MDD (major depressive disorder), recurrent, in full remission     ICD-10-CM: F33.42  ICD-9-CM: 296.36     Medication management     ICD-10-CM: Z79.899  ICD-9-CM: V58.69           Social History     Tobacco Use   Smoking Status Every Day    Current packs/day: 1.00    Average packs/day: 1 pack/day for 20.0 years (20.0 ttl pk-yrs)    Types: Cigarettes   Smokeless Tobacco Never     LYNSEY reviewed and appropriate. Patient counseled on use of controlled substances.       -The benefits of a healthy diet and exercise were discussed with patient, especially the positive effects they have on mental health. Patient encouraged to consider lifestyle modification regarding  diet and exercise patterns to maximize results of mental health treatment.  -Reviewed previous available documentation  -Reviewed most recent available labs   -Lengthy discussion with patient on the possible side effects of antipsychotic medications including increased cholesterol, increased blood sugar, and possibility of weight gain.  Also  discussed the need to monitor lab work associated with this.  The risk of muscle movement disorders with this class of medication was also discussed.  -I've explained to him that drugs of the SSRI class can have side effects such as weight gain, sexual dysfunction, insomnia, headache, nausea. These medications are generally effective at alleviating symptoms of anxiety and/or depression. Let me know if significant side effects do occur.    -Encouraged to discontinue THC use.     Visit Diagnoses:    ICD-10-CM ICD-9-CM   1. Chronic post-traumatic stress disorder (PTSD)  F43.12 309.81   2. VANDANA (generalized anxiety disorder)  F41.1 300.02   3. MDD (major depressive disorder), recurrent, in full remission  F33.42 296.36   4. Medication management  Z79.899 V58.69         TREATMENT PLAN/GOALS: Continue supportive psychotherapy efforts and medications as indicated. Treatment and medication options discussed during today's visit. Patient acknowledged and verbally consented to continue with current treatment plan and was educated on the importance of compliance with treatment and follow-up appointments.    MEDICATION ISSUES:  Discussed medication options and treatment plan of prescribed medication as well as the risks, benefits, and side effects including potential falls, possible impaired driving and metabolic adversities among others. Patient is agreeable to call the office with any worsening of symptoms or onset of side effects. Patient is agreeable to call 911 or go to the nearest ER should he/she begin having SI/HI.     MEDS ORDERED DURING VISIT:  New Medications Ordered This Visit   Medications    sertraline (ZOLOFT) 100 MG tablet     Sig: Take 1 tablet by mouth Daily.     Dispense:  30 tablet     Refill:  2    prazosin (MINIPRESS) 5 MG capsule     Sig: Take 1 capsule by mouth every night at bedtime.     Dispense:  30 capsule     Refill:  2    OLANZapine (zyPREXA) 20 MG tablet     Sig: Take 1 tablet by mouth Every Night.      Dispense:  30 tablet     Refill:  2    mirtazapine (REMERON) 45 MG tablet     Sig: Take 1 tablet by mouth every night at bedtime.     Dispense:  30 tablet     Refill:  2    busPIRone (BUSPAR) 30 MG tablet     Sig: Take 1 tablet by mouth 2 (Two) Times a Day. Monitor for serotonin syndrome as discussed     Dispense:  60 tablet     Refill:  2       Return in about 3 months (around 9/19/2024).  -Continue Zoloft 100 mg tablet take 1 tablet by mouth daily for depression  -Continue prazosin 5 mg capsule take 1 capsule by mouth every night at bedtime  -Continue olanzapine 20 mg tablet take 1 tablet by mouth every night  -Continue mirtazapine 45 mg tablet take 1 tablet by mouth every night  -Continue buspirone 30 mg tablet take 1 tablet by mouth 2 times a day for anxiety     Prognosis: Guarded dependent on medication/follow up and treatment plan compliance.  Functionality: pt showing improvements in important areas of daily functioning.     Short-term goals: Patient will adhere to medication regimen and note continued improvement in symptoms over the next 3 months.   Long-term goals: Patient will be adherent to medication management and psychotherapy with continued improvement in symptoms over the next 6 months    I spent 30 minutes caring for Deni on this date of service. This time includes time spent by me in the following activities: preparing for the visit, performing a medically appropriate examination and/or evaluation, counseling and educating the patient/family/caregiver, independently interpreting results and communicating that information with the patient/family/caregiver, and ordering medications            This document has been electronically signed by ROSALINE Elam   June 19, 2024 10:29 EDT    Part of this note may be an electronic transcription/translation of spoken language to printed text using the Dragon Dictation System.

## 2024-06-19 ENCOUNTER — OFFICE VISIT (OUTPATIENT)
Dept: PSYCHIATRY | Facility: CLINIC | Age: 54
End: 2024-06-19
Payer: MEDICARE

## 2024-06-19 VITALS
HEIGHT: 69 IN | DIASTOLIC BLOOD PRESSURE: 82 MMHG | BODY MASS INDEX: 24.97 KG/M2 | HEART RATE: 65 BPM | WEIGHT: 168.6 LBS | OXYGEN SATURATION: 99 % | SYSTOLIC BLOOD PRESSURE: 128 MMHG

## 2024-06-19 DIAGNOSIS — F41.1 GAD (GENERALIZED ANXIETY DISORDER): ICD-10-CM

## 2024-06-19 DIAGNOSIS — Z79.899 MEDICATION MANAGEMENT: ICD-10-CM

## 2024-06-19 DIAGNOSIS — F43.12 CHRONIC POST-TRAUMATIC STRESS DISORDER (PTSD): Primary | ICD-10-CM

## 2024-06-19 DIAGNOSIS — F33.42 MDD (MAJOR DEPRESSIVE DISORDER), RECURRENT, IN FULL REMISSION: ICD-10-CM

## 2024-06-19 PROCEDURE — 1160F RVW MEDS BY RX/DR IN RCRD: CPT | Performed by: NURSE PRACTITIONER

## 2024-06-19 PROCEDURE — 99214 OFFICE O/P EST MOD 30 MIN: CPT | Performed by: NURSE PRACTITIONER

## 2024-06-19 PROCEDURE — 1159F MED LIST DOCD IN RCRD: CPT | Performed by: NURSE PRACTITIONER

## 2024-06-19 RX ORDER — PRAZOSIN HYDROCHLORIDE 5 MG/1
5 CAPSULE ORAL
Qty: 30 CAPSULE | Refills: 2 | Status: SHIPPED | OUTPATIENT
Start: 2024-06-19

## 2024-06-19 RX ORDER — MIRTAZAPINE 45 MG/1
45 TABLET, FILM COATED ORAL
Qty: 30 TABLET | Refills: 2 | Status: SHIPPED | OUTPATIENT
Start: 2024-06-19

## 2024-06-19 RX ORDER — OLANZAPINE 20 MG/1
20 TABLET ORAL NIGHTLY
Qty: 30 TABLET | Refills: 2 | Status: SHIPPED | OUTPATIENT
Start: 2024-06-19

## 2024-06-19 RX ORDER — SERTRALINE HYDROCHLORIDE 100 MG/1
100 TABLET, FILM COATED ORAL DAILY
Qty: 30 TABLET | Refills: 2 | Status: SHIPPED | OUTPATIENT
Start: 2024-06-19

## 2024-06-19 RX ORDER — BUSPIRONE HYDROCHLORIDE 30 MG/1
30 TABLET ORAL 2 TIMES DAILY
Qty: 60 TABLET | Refills: 2 | Status: SHIPPED | OUTPATIENT
Start: 2024-06-19

## 2024-09-19 ENCOUNTER — OFFICE VISIT (OUTPATIENT)
Dept: PSYCHIATRY | Facility: CLINIC | Age: 54
End: 2024-09-19
Payer: MEDICARE

## 2024-09-19 VITALS
DIASTOLIC BLOOD PRESSURE: 80 MMHG | HEIGHT: 69 IN | BODY MASS INDEX: 24.62 KG/M2 | SYSTOLIC BLOOD PRESSURE: 126 MMHG | WEIGHT: 166.2 LBS | HEART RATE: 56 BPM | OXYGEN SATURATION: 98 %

## 2024-09-19 DIAGNOSIS — F33.42 MDD (MAJOR DEPRESSIVE DISORDER), RECURRENT, IN FULL REMISSION: ICD-10-CM

## 2024-09-19 DIAGNOSIS — F43.12 CHRONIC POST-TRAUMATIC STRESS DISORDER (PTSD): Primary | ICD-10-CM

## 2024-09-19 DIAGNOSIS — F41.1 GAD (GENERALIZED ANXIETY DISORDER): ICD-10-CM

## 2024-09-19 DIAGNOSIS — Z79.899 MEDICATION MANAGEMENT: ICD-10-CM

## 2024-09-19 RX ORDER — MIRTAZAPINE 45 MG/1
45 TABLET, FILM COATED ORAL
Qty: 30 TABLET | Refills: 2 | Status: SHIPPED | OUTPATIENT
Start: 2024-09-19

## 2024-09-19 RX ORDER — OLANZAPINE 20 MG/1
20 TABLET ORAL NIGHTLY
Qty: 30 TABLET | Refills: 2 | Status: SHIPPED | OUTPATIENT
Start: 2024-09-19

## 2024-09-19 RX ORDER — BUSPIRONE HYDROCHLORIDE 30 MG/1
30 TABLET ORAL 2 TIMES DAILY
Qty: 60 TABLET | Refills: 2 | Status: SHIPPED | OUTPATIENT
Start: 2024-09-19

## 2024-09-19 RX ORDER — BENZTROPINE MESYLATE 0.5 MG/1
0.5 TABLET ORAL DAILY
Qty: 30 TABLET | Refills: 2 | Status: SHIPPED | OUTPATIENT
Start: 2024-09-19 | End: 2025-09-19

## 2024-09-19 RX ORDER — ERGOCALCIFEROL 1.25 MG/1
50000 CAPSULE, LIQUID FILLED ORAL
COMMUNITY
Start: 2024-07-24

## 2024-09-19 RX ORDER — PRAZOSIN HYDROCHLORIDE 5 MG/1
5 CAPSULE ORAL
Qty: 30 CAPSULE | Refills: 2 | Status: SHIPPED | OUTPATIENT
Start: 2024-09-19

## 2024-09-19 RX ORDER — SERTRALINE HYDROCHLORIDE 100 MG/1
100 TABLET, FILM COATED ORAL DAILY
Qty: 30 TABLET | Refills: 2 | Status: SHIPPED | OUTPATIENT
Start: 2024-09-19

## 2025-01-15 RX ORDER — BENZTROPINE MESYLATE 0.5 MG/1
0.5 TABLET ORAL DAILY
Qty: 30 TABLET | Refills: 2 | Status: SHIPPED | OUTPATIENT
Start: 2025-01-15 | End: 2026-01-15

## 2025-01-24 DIAGNOSIS — F33.42 MDD (MAJOR DEPRESSIVE DISORDER), RECURRENT, IN FULL REMISSION: ICD-10-CM

## 2025-01-24 DIAGNOSIS — F41.1 GAD (GENERALIZED ANXIETY DISORDER): ICD-10-CM

## 2025-01-24 DIAGNOSIS — F43.12 CHRONIC POST-TRAUMATIC STRESS DISORDER (PTSD): ICD-10-CM

## 2025-01-24 RX ORDER — SERTRALINE HYDROCHLORIDE 100 MG/1
100 TABLET, FILM COATED ORAL DAILY
Qty: 30 TABLET | Refills: 2 | Status: SHIPPED | OUTPATIENT
Start: 2025-01-24

## 2025-01-24 RX ORDER — BUSPIRONE HYDROCHLORIDE 30 MG/1
30 TABLET ORAL 2 TIMES DAILY
Qty: 60 TABLET | Refills: 2 | Status: SHIPPED | OUTPATIENT
Start: 2025-01-24

## 2025-01-24 RX ORDER — PRAZOSIN HYDROCHLORIDE 5 MG/1
5 CAPSULE ORAL
Qty: 30 CAPSULE | Refills: 2 | Status: SHIPPED | OUTPATIENT
Start: 2025-01-24

## 2025-01-24 RX ORDER — MIRTAZAPINE 45 MG/1
45 TABLET, FILM COATED ORAL
Qty: 30 TABLET | Refills: 2 | Status: SHIPPED | OUTPATIENT
Start: 2025-01-24

## 2025-01-24 RX ORDER — OLANZAPINE 20 MG/1
20 TABLET ORAL NIGHTLY
Qty: 30 TABLET | Refills: 2 | Status: SHIPPED | OUTPATIENT
Start: 2025-01-24

## 2025-01-29 NOTE — PROGRESS NOTES
Subjective   Deni Lopez is a 54 y.o. male who presents today for follow up    Chief Complaint:  Depression, anxiety, PTSD    History of Present Illness:   History of Present Illness  Today is a follow-up visit.     Medication compliance: patient is compliant with medications, denies SE.  Depression rated 7/10, with 10 being the worst.  Anxiety rated 8/10, with 10 being the worst.    Sleep is poor, getting about 4 hours a night,  Nightmares: Denies, states last month he has not been sleeping well.  Appetite is good.  Hallucinations: Patient denies auditory hallucinations and visual hallucinations  Self-harm: Patient denies thoughts of self-harm.  Alcohol use: no  Drug use: no  Marijuana use: yes, smokes daily     Chronic health issues, no acute physical or medical issues today.    Details: He states he feels more depressed. He states he hasn't been sleeping. He states his tremor only occurs a couple of times a week.          The following portions of the patient's history were reviewed and updated as appropriate: allergies, current medications, past family history, past medical history, past social history, past surgical history and problem list.      Past Medical History:  Past Medical History:   Diagnosis Date    Anxiety     Depression     GERD (gastroesophageal reflux disease)     Hepatitis C     Hypertension     Seizure        Social History:  Social History     Socioeconomic History    Marital status:    Tobacco Use    Smoking status: Every Day     Current packs/day: 1.00     Average packs/day: 1 pack/day for 20.0 years (20.0 ttl pk-yrs)     Types: Cigarettes    Smokeless tobacco: Never   Vaping Use    Vaping status: Never Used   Substance and Sexual Activity    Alcohol use: No    Drug use: No    Sexual activity: Yes     Partners: Female       Family History:  Family History   Problem Relation Age of Onset    Drug abuse Father        Past Surgical History:  History reviewed. No pertinent surgical  history.    Problem List:  Patient Active Problem List   Diagnosis    Marijuana dependence       Allergy:   Allergies   Allergen Reactions    Codeine Sulfate Hives    Penicillins Hives        Current Medications:   Current Outpatient Medications   Medication Sig Dispense Refill    amLODIPine (NORVASC) 10 MG tablet       Aptiom 800 MG tablet tablet Take 1 tablet by mouth Daily.      atorvastatin (LIPITOR) 40 MG tablet TAKE 1 TABLET (40 MG TOTAL) BY MOUTH EVERY NIGHT.      benztropine (COGENTIN) 0.5 MG tablet Take 1 tablet by mouth Daily. 30 tablet 2    busPIRone (BUSPAR) 30 MG tablet Take 1 tablet by mouth 2 (Two) Times a Day. Monitor for serotonin syndrome as discussed 60 tablet 2    ibuprofen (ADVIL,MOTRIN) 600 MG tablet   0    Jardiance 10 MG tablet tablet Take 1 tablet by mouth Daily.      lisinopril (PRINIVIL,ZESTRIL) 40 MG tablet       losartan (COZAAR) 50 MG tablet Take 1 tablet by mouth Daily.      metoprolol tartrate (LOPRESSOR) 100 MG tablet       mirtazapine (REMERON) 45 MG tablet Take 1 tablet by mouth every night at bedtime. 30 tablet 2    Nayzilam 5 MG/0.1ML solution USE 1 SPRAY BY NOSTRIL      pantoprazole (PROTONIX) 40 MG EC tablet Daily.      potassium chloride 10 MEQ CR tablet Take 1 tablet by mouth Daily.      prazosin (MINIPRESS) 5 MG capsule Take 1 capsule by mouth every night at bedtime. 30 capsule 2    sertraline (ZOLOFT) 100 MG tablet Take 1.5 tablets by mouth Daily. 45 tablet 2    vitamin D (ERGOCALCIFEROL) 1.25 MG (35757 UT) capsule capsule Take 1 capsule by mouth Every 7 (Seven) Days.      OLANZapine-Samidorphan (Lybalvi) 15-10 MG tablet Take 1 tablet by mouth Every Night. 30 tablet 2     No current facility-administered medications for this visit.       Review of Symptoms:    Review of Systems   Neurological:  Positive for tremors.   Psychiatric/Behavioral:  Positive for sleep disturbance and depressed mood. Negative for agitation, dysphoric mood, hallucinations, self-injury, suicidal  "ideas and stress. The patient is nervous/anxious.    All other systems reviewed and are negative.      Objective   Physical Exam:   Blood pressure 120/76, pulse 65, height 175.3 cm (69.02\"), weight 74.8 kg (165 lb), SpO2 98%.  Body mass index is 24.35 kg/m².    01/31/25    MENTAL STATUS EXAM   General Appearance:  Cleanly groomed and dressed  Eye Contact:  Good eye contact  Attitude:  Cooperative  Motor Activity:  Normal gait, posture  Speech:  Normal rate, tone, volume  Language:  Spontaneous  Mood and affect:  Normal, pleasant  Hopelessness:  Denies  Thought Process:  Goal-directed and linear  Associations/ Thought Content:  No delusions  Hallucinations:  None  Suicidal Ideations:  Not present  Homicidal Ideation:  Not present  Sensorium:  Alert  Orientation:  Person, place, time and situation  Insight:  Fair  Judgement:  Fair  Reliability:  Fair  Impulse Control:  Fair      PHQ-Score Total:  PHQ-9 Total Score:      Lab Results:   No visits with results within 1 Month(s) from this visit.   Latest known visit with results is:   Office Visit on 05/06/2022   Component Date Value Ref Range Status    Glucose 05/06/2022 105 (H)  65 - 99 mg/dL Final    BUN 05/06/2022 9  6 - 20 mg/dL Final    Creatinine 05/06/2022 1.26  0.76 - 1.27 mg/dL Final    Sodium 05/06/2022 137  136 - 145 mmol/L Final    Potassium 05/06/2022 2.8 (L)  3.5 - 5.2 mmol/L Final    Chloride 05/06/2022 99  98 - 107 mmol/L Final    CO2 05/06/2022 27.5  22.0 - 29.0 mmol/L Final    Calcium 05/06/2022 8.9  8.6 - 10.5 mg/dL Final    Total Protein 05/06/2022 7.0  6.0 - 8.5 g/dL Final    Albumin 05/06/2022 4.20  3.50 - 5.20 g/dL Final    ALT (SGPT) 05/06/2022 30  1 - 41 U/L Final    AST (SGOT) 05/06/2022 35  1 - 40 U/L Final    Alkaline Phosphatase 05/06/2022 88  39 - 117 U/L Final    Total Bilirubin 05/06/2022 0.3  0.0 - 1.2 mg/dL Final    Globulin 05/06/2022 2.8  gm/dL Final    A/G Ratio 05/06/2022 1.5  g/dL Final    BUN/Creatinine Ratio 05/06/2022 7.1  7.0 - " 25.0 Final    Anion Gap 05/06/2022 10.5  5.0 - 15.0 mmol/L Final    eGFR 05/06/2022 68.2  >60.0 mL/min/1.73 Final    National Kidney Foundation and American Society of Nephrology (ASN) Task Force recommended calculation based on the Chronic Kidney Disease Epidemiology Collaboration (CKD-EPI) equation refit without adjustment for race.    Total Cholesterol 05/06/2022 164  0 - 200 mg/dL Final    Triglycerides 05/06/2022 238 (H)  0 - 150 mg/dL Final    HDL Cholesterol 05/06/2022 30 (L)  40 - 60 mg/dL Final    LDL Cholesterol  05/06/2022 93  0 - 100 mg/dL Final    VLDL Cholesterol 05/06/2022 41 (H)  5 - 40 mg/dL Final    LDL/HDL Ratio 05/06/2022 2.88   Final    TSH 05/06/2022 1.080  0.270 - 4.200 uIU/mL Final    Free T4 05/06/2022 0.98  0.93 - 1.70 ng/dL Final    WBC 05/06/2022 7.00  3.40 - 10.80 10*3/mm3 Final    RBC 05/06/2022 5.57  4.14 - 5.80 10*6/mm3 Final    Hemoglobin 05/06/2022 16.4  13.0 - 17.7 g/dL Final    Hematocrit 05/06/2022 48.7  37.5 - 51.0 % Final    MCV 05/06/2022 87.4  79.0 - 97.0 fL Final    MCH 05/06/2022 29.4  26.6 - 33.0 pg Final    MCHC 05/06/2022 33.7  31.5 - 35.7 g/dL Final    RDW 05/06/2022 13.1  12.3 - 15.4 % Final    RDW-SD 05/06/2022 42.2  37.0 - 54.0 fl Final    MPV 05/06/2022 9.9  6.0 - 12.0 fL Final    Platelets 05/06/2022 284  140 - 450 10*3/mm3 Final    Neutrophil % 05/06/2022 66.7  42.7 - 76.0 % Final    Lymphocyte % 05/06/2022 20.9  19.6 - 45.3 % Final    Monocyte % 05/06/2022 9.1  5.0 - 12.0 % Final    Eosinophil % 05/06/2022 1.7  0.3 - 6.2 % Final    Basophil % 05/06/2022 1.3  0.0 - 1.5 % Final    Immature Grans % 05/06/2022 0.3  0.0 - 0.5 % Final    Neutrophils, Absolute 05/06/2022 4.67  1.70 - 7.00 10*3/mm3 Final    Lymphocytes, Absolute 05/06/2022 1.46  0.70 - 3.10 10*3/mm3 Final    Monocytes, Absolute 05/06/2022 0.64  0.10 - 0.90 10*3/mm3 Final    Eosinophils, Absolute 05/06/2022 0.12  0.00 - 0.40 10*3/mm3 Final    Basophils, Absolute 05/06/2022 0.09  0.00 - 0.20 10*3/mm3 Final     Immature Grans, Absolute 05/06/2022 0.02  0.00 - 0.05 10*3/mm3 Final    nRBC 05/06/2022 0.0  0.0 - 0.2 /100 WBC Final       Assessment & Plan   Problems Addressed this Visit          Mental Health    Marijuana dependence     Other Visit Diagnoses       Chronic post-traumatic stress disorder (PTSD)    -  Primary    Relevant Medications    sertraline (ZOLOFT) 100 MG tablet    prazosin (MINIPRESS) 5 MG capsule    mirtazapine (REMERON) 45 MG tablet    busPIRone (BUSPAR) 30 MG tablet    OLANZapine-Samidorphan (Lybalvi) 15-10 MG tablet    VANDANA (generalized anxiety disorder)        Relevant Medications    sertraline (ZOLOFT) 100 MG tablet    mirtazapine (REMERON) 45 MG tablet    busPIRone (BUSPAR) 30 MG tablet    OLANZapine-Samidorphan (Lybalvi) 15-10 MG tablet    MDD (major depressive disorder), recurrent, in full remission        Relevant Medications    sertraline (ZOLOFT) 100 MG tablet    mirtazapine (REMERON) 45 MG tablet    busPIRone (BUSPAR) 30 MG tablet    OLANZapine-Samidorphan (Lybalvi) 15-10 MG tablet    Medication management              Diagnoses         Codes Comments    Chronic post-traumatic stress disorder (PTSD)    -  Primary ICD-10-CM: F43.12  ICD-9-CM: 309.81     VANDANA (generalized anxiety disorder)     ICD-10-CM: F41.1  ICD-9-CM: 300.02     MDD (major depressive disorder), recurrent, in full remission     ICD-10-CM: F33.42  ICD-9-CM: 296.36     Medication management     ICD-10-CM: Z79.899  ICD-9-CM: V58.69     Marijuana dependence     ICD-10-CM: F12.20  ICD-9-CM: 304.30           Social History     Tobacco Use   Smoking Status Every Day    Current packs/day: 1.00    Average packs/day: 1 pack/day for 20.0 years (20.0 ttl pk-yrs)    Types: Cigarettes   Smokeless Tobacco Never     LYNSEY reviewed and appropriate. Patient counseled on use of controlled substances.       -The benefits of a healthy diet and exercise were discussed with patient, especially the positive effects they have on mental health.  Patient encouraged to consider lifestyle modification regarding  diet and exercise patterns to maximize results of mental health treatment.  -Reviewed previous available documentation  -Reviewed most recent available labs   -Lengthy discussion with patient on the possible side effects of antipsychotic medications including increased cholesterol, increased blood sugar, and possibility of weight gain.  Also discussed the need to monitor lab work associated with this.  The risk of muscle movement disorders with this class of medication was also discussed.  -I've explained to him that drugs of the SSRI class can have side effects such as weight gain, sexual dysfunction, insomnia, headache, nausea. These medications are generally effective at alleviating symptoms of anxiety and/or depression. Let me know if significant side effects do occur.    -Encouraged to discontinue THC use.     Visit Diagnoses:    ICD-10-CM ICD-9-CM   1. Chronic post-traumatic stress disorder (PTSD)  F43.12 309.81   2. VANDANA (generalized anxiety disorder)  F41.1 300.02   3. MDD (major depressive disorder), recurrent, in full remission  F33.42 296.36   4. Medication management  Z79.899 V58.69   5. Marijuana dependence  F12.20 304.30       TREATMENT PLAN/GOALS: Continue supportive psychotherapy efforts and medications as indicated. Treatment and medication options discussed during today's visit. Patient acknowledged and verbally consented to continue with current treatment plan and was educated on the importance of compliance with treatment and follow-up appointments.    MEDICATION ISSUES:  Discussed medication options and treatment plan of prescribed medication as well as the risks, benefits, and side effects including potential falls, possible impaired driving and metabolic adversities among others. Patient is agreeable to call the office with any worsening of symptoms or onset of side effects. Patient is agreeable to call 911 or go to the nearest ER  should he/she begin having SI/HI.     MEDS ORDERED DURING VISIT:  New Medications Ordered This Visit   Medications    sertraline (ZOLOFT) 100 MG tablet     Sig: Take 1.5 tablets by mouth Daily.     Dispense:  45 tablet     Refill:  2    prazosin (MINIPRESS) 5 MG capsule     Sig: Take 1 capsule by mouth every night at bedtime.     Dispense:  30 capsule     Refill:  2    mirtazapine (REMERON) 45 MG tablet     Sig: Take 1 tablet by mouth every night at bedtime.     Dispense:  30 tablet     Refill:  2    busPIRone (BUSPAR) 30 MG tablet     Sig: Take 1 tablet by mouth 2 (Two) Times a Day. Monitor for serotonin syndrome as discussed     Dispense:  60 tablet     Refill:  2    benztropine (COGENTIN) 0.5 MG tablet     Sig: Take 1 tablet by mouth Daily.     Dispense:  30 tablet     Refill:  2    OLANZapine-Samidorphan (Lybalvi) 15-10 MG tablet     Sig: Take 1 tablet by mouth Every Night.     Dispense:  30 tablet     Refill:  2       Return in about 3 months (around 4/30/2025).  -Continue Zoloft 100 mg tablet take 1 tablet by mouth daily for depression  -Continue prazosin 5 mg capsule take 1 capsule by mouth every night at bedtime  -Change Lybalvi 15-10 mg tablet, take 1 tablet every night stop olanzapine   -Continue mirtazapine 45 mg tablet take 1 tablet by mouth every night  -Continue buspirone 30 mg tablet take 1 tablet by mouth 2 times a day for anxiety    -Continue benztropine 0.5 mg tablet take 1 tablet daily for tremor     Prognosis: Guarded dependent on medication/follow up and treatment plan compliance.  Functionality: pt showing improvements in important areas of daily functioning.     Short-term goals: Patient will adhere to medication regimen and note continued improvement in symptoms over the next 3 months.   Long-term goals: Patient will be adherent to medication management and psychotherapy with continued improvement in symptoms over the next 6 months    I spent 30 minutes caring for Deni on this date of  service. This time includes time spent by me in the following activities: preparing for the visit, performing a medically appropriate examination and/or evaluation, counseling and educating the patient/family/caregiver, documenting information in the medical record, and ordering medications          This document has been electronically signed by ROSALINE Elam   January 31, 2025 12:48 EST    Part of this note may be an electronic transcription/translation of spoken language to printed text using the Dragon Dictation System.

## 2025-01-31 ENCOUNTER — OFFICE VISIT (OUTPATIENT)
Dept: PSYCHIATRY | Facility: CLINIC | Age: 55
End: 2025-01-31
Payer: MEDICARE

## 2025-01-31 VITALS
SYSTOLIC BLOOD PRESSURE: 120 MMHG | BODY MASS INDEX: 24.44 KG/M2 | DIASTOLIC BLOOD PRESSURE: 76 MMHG | HEART RATE: 65 BPM | HEIGHT: 69 IN | OXYGEN SATURATION: 98 % | WEIGHT: 165 LBS

## 2025-01-31 DIAGNOSIS — F12.20 MARIJUANA DEPENDENCE: ICD-10-CM

## 2025-01-31 DIAGNOSIS — F43.12 CHRONIC POST-TRAUMATIC STRESS DISORDER (PTSD): Primary | ICD-10-CM

## 2025-01-31 DIAGNOSIS — F33.42 MDD (MAJOR DEPRESSIVE DISORDER), RECURRENT, IN FULL REMISSION: ICD-10-CM

## 2025-01-31 DIAGNOSIS — Z79.899 MEDICATION MANAGEMENT: ICD-10-CM

## 2025-01-31 DIAGNOSIS — F41.1 GAD (GENERALIZED ANXIETY DISORDER): ICD-10-CM

## 2025-01-31 RX ORDER — SERTRALINE HYDROCHLORIDE 100 MG/1
150 TABLET, FILM COATED ORAL DAILY
Qty: 45 TABLET | Refills: 2 | Status: SHIPPED | OUTPATIENT
Start: 2025-01-31

## 2025-01-31 RX ORDER — MIRTAZAPINE 45 MG/1
45 TABLET, FILM COATED ORAL
Qty: 30 TABLET | Refills: 2 | Status: SHIPPED | OUTPATIENT
Start: 2025-01-31

## 2025-01-31 RX ORDER — PRAZOSIN HYDROCHLORIDE 5 MG/1
5 CAPSULE ORAL
Qty: 30 CAPSULE | Refills: 2 | Status: SHIPPED | OUTPATIENT
Start: 2025-01-31

## 2025-01-31 RX ORDER — BENZTROPINE MESYLATE 0.5 MG/1
0.5 TABLET ORAL DAILY
Qty: 30 TABLET | Refills: 2 | Status: SHIPPED | OUTPATIENT
Start: 2025-01-31 | End: 2026-01-31

## 2025-01-31 RX ORDER — OLANZAPINE AND SAMIDORPHAN L-MALATE 15; 10 MG/1; MG/1
1 TABLET, FILM COATED ORAL NIGHTLY
Qty: 30 TABLET | Refills: 2 | Status: SHIPPED | OUTPATIENT
Start: 2025-01-31

## 2025-01-31 RX ORDER — BUSPIRONE HYDROCHLORIDE 30 MG/1
30 TABLET ORAL 2 TIMES DAILY
Qty: 60 TABLET | Refills: 2 | Status: SHIPPED | OUTPATIENT
Start: 2025-01-31

## 2025-02-03 ENCOUNTER — PRIOR AUTHORIZATION (OUTPATIENT)
Dept: PSYCHIATRY | Facility: CLINIC | Age: 55
End: 2025-02-03
Payer: MEDICARE

## 2025-02-03 NOTE — TELEPHONE ENCOUNTER
PA Case: [id number], Status: Approved, Coverage Starts on: 1/1/2025 12:00:00 AM, Coverage Ends on: 2/3/2026 12:00:00 AM.. Authorization Expiration Date: February 3, 2026.

## 2025-03-19 ENCOUNTER — TELEPHONE (OUTPATIENT)
Dept: PSYCHIATRY | Facility: CLINIC | Age: 55
End: 2025-03-19
Payer: MEDICARE

## 2025-03-19 RX ORDER — OLANZAPINE 20 MG/1
20 TABLET ORAL NIGHTLY
Qty: 30 TABLET | Refills: 2 | Status: SHIPPED | OUTPATIENT
Start: 2025-03-19

## 2025-03-19 NOTE — TELEPHONE ENCOUNTER
Patient requesting call back from provider to discuss medication concerns. He says the Lybalvi makes him feel too bad to continue taking it.

## 2025-03-19 NOTE — PROGRESS NOTES
He reports since he began taking Lybalvi he has been extremely drowsy and sick at his stomach.  He states he did better on the olanzapine 20 mg dose.  Will discontinue Lybalvi and restart olanzapine 20 mg tablet daily, I have instructed him do not take Lybalvi and olanzapine together, Lybalvi is discontinued. He verbalizes understanding.

## 2025-04-23 NOTE — PROGRESS NOTES
Subjective   Deni Lopez is a 54 y.o. male who presents today for follow up    Chief Complaint: PTSD, anxiety, depression    History of Present Illness:   History of Present Illness   History of Present Illness  The patient is a 54-year-old male who presents for a follow-up visit for PTSD, anxiety, and depression.    He reports experiencing sleep disturbances, despite being on Remeron and Zyprexa. His sleep duration has been limited to a few hours per night this week, with no daytime sleep. This pattern has been consistent for some time. He does not consume coffee or energy drinks but maintains adequate hydration through water intake. He does not experience nightmares.    He quantifies his depression and anxiety levels at a severity of 7 on a scale of 1 to 10. He reports no auditory or visual hallucinations and has no ideations of self-harm or harm to others. His appetite remains normal. He admits to daily marijuana use. His current medication regimen includes Zoloft 100 mg (1.5 tablets) taken at 10:00 AM, prazosin 5 mg taken at bedtime, olanzapine 20 mg taken at night, mirtazapine 45 mg taken at 9:00 PM, buspirone 30 mg taken twice daily (10:00 AM and 9:00 PM), and benztropine 0.5 mg taken once daily at 10:00 AM.    He has hypertension     SOCIAL HISTORY  He smokes marijuana daily.    MEDICATIONS  Current: Remeron, Zyprexa, Zoloft, prazosin, olanzapine, mirtazapine, buspirone, benztropine            The following portions of the patient's history were reviewed and updated as appropriate: allergies, current medications, past family history, past medical history, past social history, past surgical history and problem list.      Past Medical History:  Past Medical History:   Diagnosis Date    Anxiety     Depression     GERD (gastroesophageal reflux disease)     Hepatitis C     Hypertension     Seizure        Social History:  Social History     Socioeconomic History    Marital status:    Tobacco Use    Smoking  status: Every Day     Current packs/day: 1.00     Average packs/day: 1 pack/day for 20.0 years (20.0 ttl pk-yrs)     Types: Cigarettes    Smokeless tobacco: Never   Vaping Use    Vaping status: Never Used   Substance and Sexual Activity    Alcohol use: No    Drug use: No    Sexual activity: Yes     Partners: Female       Family History:  Family History   Problem Relation Age of Onset    Drug abuse Father        Past Surgical History:  History reviewed. No pertinent surgical history.    Problem List:  Patient Active Problem List   Diagnosis    Marijuana dependence       Allergy:   Allergies   Allergen Reactions    Codeine Sulfate Hives    Penicillins Hives        Current Medications:   Current Outpatient Medications   Medication Sig Dispense Refill    amLODIPine (NORVASC) 10 MG tablet       Aptiom 800 MG tablet tablet Take 1 tablet by mouth Daily.      atorvastatin (LIPITOR) 40 MG tablet TAKE 1 TABLET (40 MG TOTAL) BY MOUTH EVERY NIGHT.      benztropine (COGENTIN) 0.5 MG tablet Take 1 tablet by mouth Daily. 30 tablet 2    busPIRone (BUSPAR) 30 MG tablet Take 1 tablet by mouth 2 (Two) Times a Day. Monitor for serotonin syndrome as discussed 60 tablet 2    ibuprofen (ADVIL,MOTRIN) 600 MG tablet   0    Jardiance 10 MG tablet tablet Take 1 tablet by mouth Daily.      lisinopril (PRINIVIL,ZESTRIL) 40 MG tablet       losartan (COZAAR) 50 MG tablet Take 1 tablet by mouth Daily.      metoprolol tartrate (LOPRESSOR) 100 MG tablet       mirtazapine (REMERON) 45 MG tablet Take 1 tablet by mouth every night at bedtime. 30 tablet 2    Nayzilam 5 MG/0.1ML solution USE 1 SPRAY BY NOSTRIL      OLANZapine (zyPREXA) 20 MG tablet Take 1 tablet by mouth Every Night. DO NOT TAKE LYBALVI 30 tablet 2    pantoprazole (PROTONIX) 40 MG EC tablet Daily.      potassium chloride 10 MEQ CR tablet Take 1 tablet by mouth Daily.      prazosin (MINIPRESS) 5 MG capsule Take 1 capsule by mouth every night at bedtime. 30 capsule 2    sertraline (ZOLOFT)  "100 MG tablet Take 1.5 tablets by mouth Daily. 45 tablet 2    vitamin D (ERGOCALCIFEROL) 1.25 MG (53045 UT) capsule capsule Take 1 capsule by mouth Every 7 (Seven) Days.      prazosin (MINIPRESS) 2 MG capsule Take 1 capsule by mouth Every Night. Take with Prazosin 5 mg capsule every night 30 capsule 2     No current facility-administered medications for this visit.       Review of Symptoms:    Review of Systems   Psychiatric/Behavioral:  Positive for depressed mood. The patient is nervous/anxious.    All other systems reviewed and are negative.      Objective   Physical Exam:   Blood pressure 126/74, pulse 56, height 175.3 cm (69.02\"), weight 69.6 kg (153 lb 6.4 oz), SpO2 99%.  Body mass index is 22.64 kg/m².  Facility age limit for growth %myesha is 20 years.    04/25/25  MENTAL STATUS EXAM   General Appearance:  Cleanly groomed and dressed  Eye Contact:  Good eye contact  Attitude:  Cooperative  Motor Activity:  Normal gait, posture  Speech:  Normal rate, tone, volume  Language:  Spontaneous  Mood and affect:  Normal, pleasant  Hopelessness:  Denies  Thought Process:  Goal-directed and linear  Associations/ Thought Content:  No delusions  Hallucinations:  None  Suicidal Ideations:  Not present  Homicidal Ideation:  Not present  Sensorium:  Alert  Orientation:  Person, place, time and situation  Insight:  Fair  Judgement:  Fair  Reliability:  Fair  Impulse Control:  Fair        PHQ-Score Total:  PHQ-9 Total Score: 6    Lab Results:   No visits with results within 1 Month(s) from this visit.   Latest known visit with results is:   Office Visit on 05/06/2022   Component Date Value Ref Range Status    Glucose 05/06/2022 105 (H)  65 - 99 mg/dL Final    BUN 05/06/2022 9  6 - 20 mg/dL Final    Creatinine 05/06/2022 1.26  0.76 - 1.27 mg/dL Final    Sodium 05/06/2022 137  136 - 145 mmol/L Final    Potassium 05/06/2022 2.8 (L)  3.5 - 5.2 mmol/L Final    Chloride 05/06/2022 99  98 - 107 mmol/L Final    CO2 05/06/2022 27.5  " 22.0 - 29.0 mmol/L Final    Calcium 05/06/2022 8.9  8.6 - 10.5 mg/dL Final    Total Protein 05/06/2022 7.0  6.0 - 8.5 g/dL Final    Albumin 05/06/2022 4.20  3.50 - 5.20 g/dL Final    ALT (SGPT) 05/06/2022 30  1 - 41 U/L Final    AST (SGOT) 05/06/2022 35  1 - 40 U/L Final    Alkaline Phosphatase 05/06/2022 88  39 - 117 U/L Final    Total Bilirubin 05/06/2022 0.3  0.0 - 1.2 mg/dL Final    Globulin 05/06/2022 2.8  gm/dL Final    A/G Ratio 05/06/2022 1.5  g/dL Final    BUN/Creatinine Ratio 05/06/2022 7.1  7.0 - 25.0 Final    Anion Gap 05/06/2022 10.5  5.0 - 15.0 mmol/L Final    eGFR 05/06/2022 68.2  >60.0 mL/min/1.73 Final    National Kidney Foundation and American Society of Nephrology (ASN) Task Force recommended calculation based on the Chronic Kidney Disease Epidemiology Collaboration (CKD-EPI) equation refit without adjustment for race.    Total Cholesterol 05/06/2022 164  0 - 200 mg/dL Final    Triglycerides 05/06/2022 238 (H)  0 - 150 mg/dL Final    HDL Cholesterol 05/06/2022 30 (L)  40 - 60 mg/dL Final    LDL Cholesterol  05/06/2022 93  0 - 100 mg/dL Final    VLDL Cholesterol 05/06/2022 41 (H)  5 - 40 mg/dL Final    LDL/HDL Ratio 05/06/2022 2.88   Final    TSH 05/06/2022 1.080  0.270 - 4.200 uIU/mL Final    Free T4 05/06/2022 0.98  0.93 - 1.70 ng/dL Final    WBC 05/06/2022 7.00  3.40 - 10.80 10*3/mm3 Final    RBC 05/06/2022 5.57  4.14 - 5.80 10*6/mm3 Final    Hemoglobin 05/06/2022 16.4  13.0 - 17.7 g/dL Final    Hematocrit 05/06/2022 48.7  37.5 - 51.0 % Final    MCV 05/06/2022 87.4  79.0 - 97.0 fL Final    MCH 05/06/2022 29.4  26.6 - 33.0 pg Final    MCHC 05/06/2022 33.7  31.5 - 35.7 g/dL Final    RDW 05/06/2022 13.1  12.3 - 15.4 % Final    RDW-SD 05/06/2022 42.2  37.0 - 54.0 fl Final    MPV 05/06/2022 9.9  6.0 - 12.0 fL Final    Platelets 05/06/2022 284  140 - 450 10*3/mm3 Final    Neutrophil % 05/06/2022 66.7  42.7 - 76.0 % Final    Lymphocyte % 05/06/2022 20.9  19.6 - 45.3 % Final    Monocyte % 05/06/2022 9.1   5.0 - 12.0 % Final    Eosinophil % 05/06/2022 1.7  0.3 - 6.2 % Final    Basophil % 05/06/2022 1.3  0.0 - 1.5 % Final    Immature Grans % 05/06/2022 0.3  0.0 - 0.5 % Final    Neutrophils, Absolute 05/06/2022 4.67  1.70 - 7.00 10*3/mm3 Final    Lymphocytes, Absolute 05/06/2022 1.46  0.70 - 3.10 10*3/mm3 Final    Monocytes, Absolute 05/06/2022 0.64  0.10 - 0.90 10*3/mm3 Final    Eosinophils, Absolute 05/06/2022 0.12  0.00 - 0.40 10*3/mm3 Final    Basophils, Absolute 05/06/2022 0.09  0.00 - 0.20 10*3/mm3 Final    Immature Grans, Absolute 05/06/2022 0.02  0.00 - 0.05 10*3/mm3 Final    nRBC 05/06/2022 0.0  0.0 - 0.2 /100 WBC Final     Results      Assessment & Plan   Problems Addressed this Visit    None  Visit Diagnoses         Chronic post-traumatic stress disorder (PTSD)    -  Primary    Relevant Medications    sertraline (ZOLOFT) 100 MG tablet    prazosin (MINIPRESS) 5 MG capsule    OLANZapine (zyPREXA) 20 MG tablet    mirtazapine (REMERON) 45 MG tablet    busPIRone (BUSPAR) 30 MG tablet      VANDANA (generalized anxiety disorder)        Relevant Medications    sertraline (ZOLOFT) 100 MG tablet    OLANZapine (zyPREXA) 20 MG tablet    mirtazapine (REMERON) 45 MG tablet    busPIRone (BUSPAR) 30 MG tablet      MDD (major depressive disorder), recurrent, in full remission        Relevant Medications    sertraline (ZOLOFT) 100 MG tablet    OLANZapine (zyPREXA) 20 MG tablet    mirtazapine (REMERON) 45 MG tablet    busPIRone (BUSPAR) 30 MG tablet      Medication management              Diagnoses         Codes Comments      Chronic post-traumatic stress disorder (PTSD)    -  Primary ICD-10-CM: F43.12  ICD-9-CM: 309.81       VANDANA (generalized anxiety disorder)     ICD-10-CM: F41.1  ICD-9-CM: 300.02       MDD (major depressive disorder), recurrent, in full remission     ICD-10-CM: F33.42  ICD-9-CM: 296.36       Medication management     ICD-10-CM: Z79.899  ICD-9-CM: V58.69           Assessment & Plan  1. Post-Traumatic Stress  Disorder (PTSD).  He reports poor sleep despite taking Remeron and Zyprexa. He will increase his prazosin dosage by adding a 2 mg tablet to his current 5 mg regimen, to be taken at 7:00 PM. This adjustment aims to improve his sleep quality. The prescription will be sent to [facility] in [place]. He is advised to monitor his blood pressure due to the potential hypotensive effects of prazosin. If his blood pressure drops significantly, he should discontinue the medication.    2. Anxiety.  He rates his anxiety at a 7 out of 10. He will continue his current medications, including Zoloft 100 mg (1-1/2 tablets every morning), buspirone 30 mg (twice a day), and olanzapine 20 mg (at night).     3. Depression.  He rates his depression at a 7 out of 10. He will continue his current medications, including mirtazapine 45 mg (at night).     4. Hypertension.  He is not currently on any antihypertensive medication. He is advised to monitor his blood pressure due to the potential hypotensive effects of prazosin. If his blood pressure drops significantly, he should discontinue the medication.    Social History     Tobacco Use   Smoking Status Every Day    Current packs/day: 1.00    Average packs/day: 1 pack/day for 20.0 years (20.0 ttl pk-yrs)    Types: Cigarettes   Smokeless Tobacco Never       LYNSEY reviewed and appropriate. Patient counseled on use of controlled substances.     -The benefits of a healthy diet and exercise were discussed with patient, especially the positive effects they have on mental health. Patient encouraged to consider lifestyle modification regarding  diet and exercise patterns to maximize results of mental health treatment.  -Reviewed previous available documentation  -Reviewed most recent available labs     -Lengthy discussion with patient on the possible side effects of antipsychotic medications including increased cholesterol, increased blood sugar, and possibility of weight gain.  Also  discussed the need to monitor lab work associated with this.  The risk of muscle movement disorders with this class of medication was also discussed.    -I've explained to him that drugs of the SSRI class can have side effects such as weight gain, sexual dysfunction, insomnia, headache, nausea. These medications are generally effective at alleviating symptoms of anxiety and/or depression. Let me know if significant side effects do occur.        Visit Diagnoses:    ICD-10-CM ICD-9-CM   1. Chronic post-traumatic stress disorder (PTSD)  F43.12 309.81   2. VANDANA (generalized anxiety disorder)  F41.1 300.02   3. MDD (major depressive disorder), recurrent, in full remission  F33.42 296.36   4. Medication management  Z79.899 V58.69         TREATMENT PLAN/GOALS: Continue supportive psychotherapy efforts and medications as indicated. Treatment and medication options discussed during today's visit. Patient acknowledged and verbally consented to continue with current treatment plan and was educated on the importance of compliance with treatment and follow-up appointments.    MEDICATION ISSUES:  Discussed medication options and treatment plan of prescribed medication as well as the risks, benefits, and side effects including potential falls, possible impaired driving and metabolic adversities among others. Patient is agreeable to call the office with any worsening of symptoms or onset of side effects. Patient is agreeable to call 911 or go to the nearest ER should he/she begin having SI/HI.     MEDS ORDERED DURING VISIT:  New Medications Ordered This Visit   Medications    sertraline (ZOLOFT) 100 MG tablet     Sig: Take 1.5 tablets by mouth Daily.     Dispense:  45 tablet     Refill:  2    prazosin (MINIPRESS) 5 MG capsule     Sig: Take 1 capsule by mouth every night at bedtime.     Dispense:  30 capsule     Refill:  2    OLANZapine (zyPREXA) 20 MG tablet     Sig: Take 1 tablet by mouth Every Night. DO NOT TAKE LYBALVI      Dispense:  30 tablet     Refill:  2    mirtazapine (REMERON) 45 MG tablet     Sig: Take 1 tablet by mouth every night at bedtime.     Dispense:  30 tablet     Refill:  2    busPIRone (BUSPAR) 30 MG tablet     Sig: Take 1 tablet by mouth 2 (Two) Times a Day. Monitor for serotonin syndrome as discussed     Dispense:  60 tablet     Refill:  2    benztropine (COGENTIN) 0.5 MG tablet     Sig: Take 1 tablet by mouth Daily.     Dispense:  30 tablet     Refill:  2    prazosin (MINIPRESS) 2 MG capsule     Sig: Take 1 capsule by mouth Every Night. Take with Prazosin 5 mg capsule every night     Dispense:  30 capsule     Refill:  2     -Continue Zoloft 100 mg tablet take 1 tablet by mouth daily for depression  -Continue prazosin 5 mg capsule take 1 capsule by mouth every night at bedtime  -Add prazosin 2 mg capsule take 1 capsule by mouth every not take with prazosin 5 mg capsule nightly  - Continue Lybalvi 15-10 mg tablet, take 1 tablet every night stop olanzapine   -Continue mirtazapine 45 mg tablet take 1 tablet by mouth every night  -Continue buspirone 30 mg tablet take 1 tablet by mouth 2 times a day for anxiety     -Continue benztropine 0.5 mg tablet take 1 tablet daily for tremor    Return in about 3 months (around 7/25/2025).         Prognosis: Guarded dependent on medication/follow up and treatment plan compliance.  Functionality: pt showing improvements in important areas of daily functioning.     Short-term goals: Patient will adhere to medication regimen and note continued improvement in symptoms over the next 3 months.   Long-term goals: Patient will be adherent to medication management and psychotherapy with continued improvement in symptoms over the next 6 months.    I spent 30 minutes caring for Deni on this date of service. This time includes time spent by me in the following activities: preparing for the visit, performing a medically appropriate examination and/or evaluation, counseling and educating the  patient/family/caregiver, documenting information in the medical record, and ordering medications    I have personally reviewed this patients note, confirmed and/or updated, this visit as appropriate. History of present illness- interval history, physical examination, assessment and plan, allergies, current medications, past family history, past medical history, past social history, past surgical history and problem list.          This document has been electronically signed by ROSALINE Elam   April 25, 2025 10:36 EDT    Patient or patient representative verbalized consent for the use of Ambient Listening during the visit with  ROSALINE Elam for chart documentation. 4/25/2025  10:19 EDT    Part of this note may be an electronic transcription/translation of spoken language to printed text using the Dragon Dictation System.

## 2025-04-25 ENCOUNTER — OFFICE VISIT (OUTPATIENT)
Dept: PSYCHIATRY | Facility: CLINIC | Age: 55
End: 2025-04-25
Payer: MEDICARE

## 2025-04-25 VITALS
HEIGHT: 69 IN | DIASTOLIC BLOOD PRESSURE: 74 MMHG | HEART RATE: 56 BPM | BODY MASS INDEX: 22.72 KG/M2 | WEIGHT: 153.4 LBS | OXYGEN SATURATION: 99 % | SYSTOLIC BLOOD PRESSURE: 126 MMHG

## 2025-04-25 DIAGNOSIS — F41.1 GAD (GENERALIZED ANXIETY DISORDER): ICD-10-CM

## 2025-04-25 DIAGNOSIS — F33.42 MDD (MAJOR DEPRESSIVE DISORDER), RECURRENT, IN FULL REMISSION: ICD-10-CM

## 2025-04-25 DIAGNOSIS — F43.12 CHRONIC POST-TRAUMATIC STRESS DISORDER (PTSD): Primary | ICD-10-CM

## 2025-04-25 DIAGNOSIS — Z79.899 MEDICATION MANAGEMENT: ICD-10-CM

## 2025-04-25 RX ORDER — BENZTROPINE MESYLATE 0.5 MG/1
0.5 TABLET ORAL DAILY
Qty: 30 TABLET | Refills: 2 | Status: SHIPPED | OUTPATIENT
Start: 2025-04-25 | End: 2026-04-25

## 2025-04-25 RX ORDER — MIRTAZAPINE 45 MG/1
45 TABLET, FILM COATED ORAL
Qty: 30 TABLET | Refills: 2 | Status: SHIPPED | OUTPATIENT
Start: 2025-04-25

## 2025-04-25 RX ORDER — PRAZOSIN HYDROCHLORIDE 2 MG/1
2 CAPSULE ORAL NIGHTLY
Qty: 30 CAPSULE | Refills: 2 | Status: SHIPPED | OUTPATIENT
Start: 2025-04-25

## 2025-04-25 RX ORDER — PRAZOSIN HYDROCHLORIDE 5 MG/1
5 CAPSULE ORAL
Qty: 30 CAPSULE | Refills: 2 | Status: SHIPPED | OUTPATIENT
Start: 2025-04-25

## 2025-04-25 RX ORDER — OLANZAPINE 20 MG/1
20 TABLET, FILM COATED ORAL NIGHTLY
Qty: 30 TABLET | Refills: 2 | Status: SHIPPED | OUTPATIENT
Start: 2025-04-25

## 2025-04-25 RX ORDER — BUSPIRONE HYDROCHLORIDE 30 MG/1
30 TABLET ORAL 2 TIMES DAILY
Qty: 60 TABLET | Refills: 2 | Status: SHIPPED | OUTPATIENT
Start: 2025-04-25

## 2025-04-25 RX ORDER — SERTRALINE HYDROCHLORIDE 100 MG/1
150 TABLET, FILM COATED ORAL DAILY
Qty: 45 TABLET | Refills: 2 | Status: SHIPPED | OUTPATIENT
Start: 2025-04-25

## 2025-07-16 NOTE — PROGRESS NOTES
"Subjective   Deni Lopez is a 55 y.o. male who presents today for follow up    Chief Complaint: PTSD anxiety depression    History of Present Illness:   History of Present Illness   History of Present Illness  The patient is a 55-year-old male who presents for a follow-up visit regarding PTSD, anxiety, and depression.    He reports feeling generally well but believes he may be experiencing depression. He describes a lack of motivation and interest in activities, stating \"I don't want to do nothing, don't feel like doing nothing. I have no interest in doing that.\" He rates his depression as 7 or 8 out of 10 and his anxiety as 7 out of 10. He denies  experiencing auditory and visual hallucinations and denies thoughts of self-harm and harm to others. He consumes marijuana daily and is seeking a prescription for it.     Social History:  - Very outspoken advocate for marijuana    Substance Use:  - Consumes marijuana daily    Pertinent Negatives:  - Reports no alcohol or other drug use    SOCIAL HISTORY  He smokes marijuana daily.            The following portions of the patient's history were reviewed and updated as appropriate: allergies, current medications, past family history, past medical history, past social history, past surgical history and problem list.      Past Medical History:  Past Medical History:   Diagnosis Date    Anxiety     Depression     GERD (gastroesophageal reflux disease)     Hepatitis C     Hypertension     Seizure        Social History:  Social History     Socioeconomic History    Marital status:    Tobacco Use    Smoking status: Every Day     Current packs/day: 1.00     Average packs/day: 1 pack/day for 20.0 years (20.0 ttl pk-yrs)     Types: Cigarettes    Smokeless tobacco: Never   Vaping Use    Vaping status: Never Used   Substance and Sexual Activity    Alcohol use: No    Drug use: No    Sexual activity: Yes     Partners: Female       Family History:  Family History   Problem " Relation Age of Onset    Drug abuse Father        Past Surgical History:  History reviewed. No pertinent surgical history.    Problem List:  Patient Active Problem List   Diagnosis    Marijuana dependence       Allergy:   Allergies   Allergen Reactions    Codeine Sulfate Hives    Penicillins Hives        Current Medications:   Current Outpatient Medications   Medication Sig Dispense Refill    benztropine (COGENTIN) 0.5 MG tablet Take 1 tablet by mouth Daily. 30 tablet 2    busPIRone (BUSPAR) 30 MG tablet Take 1 tablet by mouth 2 (Two) Times a Day. Monitor for serotonin syndrome as discussed 60 tablet 2    mirtazapine (REMERON) 45 MG tablet Take 1 tablet by mouth every night at bedtime. 30 tablet 2    OLANZapine (zyPREXA) 20 MG tablet Take 1 tablet by mouth Every Night. DO NOT TAKE LYBALVI 30 tablet 2    prazosin (MINIPRESS) 2 MG capsule Take 1 capsule by mouth Every Night. Take with Prazosin 5 mg capsule every night 30 capsule 2    prazosin (MINIPRESS) 5 MG capsule Take 1 capsule by mouth every night at bedtime. 30 capsule 2    sertraline (ZOLOFT) 100 MG tablet Take 2 tablets by mouth Daily. 60 tablet 2    amLODIPine (NORVASC) 10 MG tablet       Aptiom 800 MG tablet tablet Take 1 tablet by mouth Daily.      atorvastatin (LIPITOR) 40 MG tablet TAKE 1 TABLET (40 MG TOTAL) BY MOUTH EVERY NIGHT.      ibuprofen (ADVIL,MOTRIN) 600 MG tablet   0    Jardiance 10 MG tablet tablet Take 1 tablet by mouth Daily.      lisinopril (PRINIVIL,ZESTRIL) 40 MG tablet       losartan (COZAAR) 50 MG tablet Take 1 tablet by mouth Daily.      metoprolol tartrate (LOPRESSOR) 100 MG tablet       Nayzilam 5 MG/0.1ML solution USE 1 SPRAY BY NOSTRIL      pantoprazole (PROTONIX) 40 MG EC tablet Daily.      potassium chloride 10 MEQ CR tablet Take 1 tablet by mouth Daily.      vitamin D (ERGOCALCIFEROL) 1.25 MG (35417 UT) capsule capsule Take 1 capsule by mouth Every 7 (Seven) Days.       No current facility-administered medications for this  "visit.       Review of Symptoms:    Review of Systems   Psychiatric/Behavioral:  Positive for depressed mood. Negative for dysphoric mood, hallucinations, self-injury, sleep disturbance, suicidal ideas and stress. The patient is nervous/anxious.    All other systems reviewed and are negative.      Objective   Physical Exam:   Blood pressure 142/92, pulse 51, height 175.3 cm (69\"), weight 73 kg (161 lb), SpO2 100%.  Body mass index is 23.78 kg/m².  Facility age limit for growth %myesha is 20 years.    07/18/25  MENTAL STATUS EXAM   General Appearance:  Cleanly groomed and dressed  Eye Contact:  Good eye contact  Attitude:  Cooperative  Motor Activity:  Normal gait, posture  Speech:  Normal rate, tone, volume  Language:  Spontaneous  Mood and affect:  Depressed  Hopelessness:  Denies  Thought Process:  Goal-directed and linear  Associations/ Thought Content:  No delusions  Hallucinations:  None  Suicidal Ideations:  Not present  Homicidal Ideation:  Not present  Sensorium:  Alert  Orientation:  Person, place, time and situation  Insight:  Fair  Judgement:  Fair  Reliability:  Fair  Impulse Control:  Fair        PHQ-Score Total:  PHQ-9 Total Score: 5    Lab Results:   No visits with results within 1 Month(s) from this visit.   Latest known visit with results is:   Office Visit on 05/06/2022   Component Date Value Ref Range Status    Glucose 05/06/2022 105 (H)  65 - 99 mg/dL Final    BUN 05/06/2022 9  6 - 20 mg/dL Final    Creatinine 05/06/2022 1.26  0.76 - 1.27 mg/dL Final    Sodium 05/06/2022 137  136 - 145 mmol/L Final    Potassium 05/06/2022 2.8 (L)  3.5 - 5.2 mmol/L Final    Chloride 05/06/2022 99  98 - 107 mmol/L Final    CO2 05/06/2022 27.5  22.0 - 29.0 mmol/L Final    Calcium 05/06/2022 8.9  8.6 - 10.5 mg/dL Final    Total Protein 05/06/2022 7.0  6.0 - 8.5 g/dL Final    Albumin 05/06/2022 4.20  3.50 - 5.20 g/dL Final    ALT (SGPT) 05/06/2022 30  1 - 41 U/L Final    AST (SGOT) 05/06/2022 35  1 - 40 U/L Final    " Alkaline Phosphatase 05/06/2022 88  39 - 117 U/L Final    Total Bilirubin 05/06/2022 0.3  0.0 - 1.2 mg/dL Final    Globulin 05/06/2022 2.8  gm/dL Final    A/G Ratio 05/06/2022 1.5  g/dL Final    BUN/Creatinine Ratio 05/06/2022 7.1  7.0 - 25.0 Final    Anion Gap 05/06/2022 10.5  5.0 - 15.0 mmol/L Final    eGFR 05/06/2022 68.2  >60.0 mL/min/1.73 Final    National Kidney Foundation and American Society of Nephrology (ASN) Task Force recommended calculation based on the Chronic Kidney Disease Epidemiology Collaboration (CKD-EPI) equation refit without adjustment for race.    Total Cholesterol 05/06/2022 164  0 - 200 mg/dL Final    Triglycerides 05/06/2022 238 (H)  0 - 150 mg/dL Final    HDL Cholesterol 05/06/2022 30 (L)  40 - 60 mg/dL Final    LDL Cholesterol  05/06/2022 93  0 - 100 mg/dL Final    VLDL Cholesterol 05/06/2022 41 (H)  5 - 40 mg/dL Final    LDL/HDL Ratio 05/06/2022 2.88   Final    TSH 05/06/2022 1.080  0.270 - 4.200 uIU/mL Final    Free T4 05/06/2022 0.98  0.93 - 1.70 ng/dL Final    WBC 05/06/2022 7.00  3.40 - 10.80 10*3/mm3 Final    RBC 05/06/2022 5.57  4.14 - 5.80 10*6/mm3 Final    Hemoglobin 05/06/2022 16.4  13.0 - 17.7 g/dL Final    Hematocrit 05/06/2022 48.7  37.5 - 51.0 % Final    MCV 05/06/2022 87.4  79.0 - 97.0 fL Final    MCH 05/06/2022 29.4  26.6 - 33.0 pg Final    MCHC 05/06/2022 33.7  31.5 - 35.7 g/dL Final    RDW 05/06/2022 13.1  12.3 - 15.4 % Final    RDW-SD 05/06/2022 42.2  37.0 - 54.0 fl Final    MPV 05/06/2022 9.9  6.0 - 12.0 fL Final    Platelets 05/06/2022 284  140 - 450 10*3/mm3 Final    Neutrophil % 05/06/2022 66.7  42.7 - 76.0 % Final    Lymphocyte % 05/06/2022 20.9  19.6 - 45.3 % Final    Monocyte % 05/06/2022 9.1  5.0 - 12.0 % Final    Eosinophil % 05/06/2022 1.7  0.3 - 6.2 % Final    Basophil % 05/06/2022 1.3  0.0 - 1.5 % Final    Immature Grans % 05/06/2022 0.3  0.0 - 0.5 % Final    Neutrophils, Absolute 05/06/2022 4.67  1.70 - 7.00 10*3/mm3 Final    Lymphocytes, Absolute  05/06/2022 1.46  0.70 - 3.10 10*3/mm3 Final    Monocytes, Absolute 05/06/2022 0.64  0.10 - 0.90 10*3/mm3 Final    Eosinophils, Absolute 05/06/2022 0.12  0.00 - 0.40 10*3/mm3 Final    Basophils, Absolute 05/06/2022 0.09  0.00 - 0.20 10*3/mm3 Final    Immature Grans, Absolute 05/06/2022 0.02  0.00 - 0.05 10*3/mm3 Final    nRBC 05/06/2022 0.0  0.0 - 0.2 /100 WBC Final     Results      Assessment & Plan   Problems Addressed this Visit    None  Visit Diagnoses         Chronic post-traumatic stress disorder (PTSD)    -  Primary    Relevant Medications    sertraline (ZOLOFT) 100 MG tablet    prazosin (MINIPRESS) 5 MG capsule    OLANZapine (zyPREXA) 20 MG tablet    mirtazapine (REMERON) 45 MG tablet    busPIRone (BUSPAR) 30 MG tablet      VANDANA (generalized anxiety disorder)        Relevant Medications    sertraline (ZOLOFT) 100 MG tablet    OLANZapine (zyPREXA) 20 MG tablet    mirtazapine (REMERON) 45 MG tablet    busPIRone (BUSPAR) 30 MG tablet      MDD (major depressive disorder), recurrent, in full remission        Relevant Medications    sertraline (ZOLOFT) 100 MG tablet    OLANZapine (zyPREXA) 20 MG tablet    mirtazapine (REMERON) 45 MG tablet    busPIRone (BUSPAR) 30 MG tablet      Medication management              Diagnoses         Codes Comments      Chronic post-traumatic stress disorder (PTSD)    -  Primary ICD-10-CM: F43.12  ICD-9-CM: 309.81       VANDANA (generalized anxiety disorder)     ICD-10-CM: F41.1  ICD-9-CM: 300.02       MDD (major depressive disorder), recurrent, in full remission     ICD-10-CM: F33.42  ICD-9-CM: 296.36       Medication management     ICD-10-CM: Z79.899  ICD-9-CM: V58.69           Assessment & Plan  Problems:  - Post-Traumatic Stress Disorder (PTSD)  - Anxiety  - Depression    Content of Therapy:  During the session, the patient expressed feelings of depression, characterized by a lack of interest in activities and a general sense of apathy. Anxiety levels were also discussed, with the  patient rating both depression and anxiety as 7-8 out of 10. The patient reported adherence to his medication regimen, denies experiencing auditory and visual hallucinations. The use of marijuana was discussed, with the patient advocating for its use. The clinician addressed the patient's medication management, including the potential need for dosage adjustments.    Clinical Impression:  The patient presents with significant symptoms of depression and anxiety, both rated as 7-8 out of 10. There is no reported intent to harm himself or others. The patient uses marijuana daily and is an advocate for its use. Overall, the patient's mental health appears to be stable, but there is a need for medication adjustments to better manage his symptoms.    Therapeutic Intervention:  The clinician discussed increasing the dosage of Zoloft from 1.5 tablets to 2 tablets daily to improve the management of depression and anxiety symptoms. The patient is currently on benztropine 0.5 mg tablet once daily, buspirone 30 mg tablet twice daily, mirtazapine 45 mg tablet nightly, olanzapine 20 mg tablet nightly, prazosin 2 mg capsule nightly, and prazosin 5 mg capsule nightly. The clinician advised the patient to monitor his blood pressure and heart rate closely due to the potential effects of prazosin and other medications.    Plan:  - Increase Zoloft to 100 mg (2 tablets) daily.  - Continue current medications: benztropine 0.5 mg tablet once daily, buspirone 30 mg tablet twice daily, mirtazapine 45 mg tablet nightly, olanzapine 20 mg tablet nightly, prazosin 2 mg capsule nightly, prazosin 5 mg capsule nightly.  - Monitor blood pressure and heart rate closely.  - Refills for all medications will be sent to the pharmacy.    Follow-up:  - Next appointment scheduled in 3 months.    Notes & Risk Factors:  - Daily use of marijuana.  - No reported intent to harm self or others.      Social History     Tobacco Use   Smoking Status Every Day     Current packs/day: 1.00    Average packs/day: 1 pack/day for 20.0 years (20.0 ttl pk-yrs)    Types: Cigarettes   Smokeless Tobacco Never       LYNSEY reviewed and appropriate. Patient counseled on use of controlled substances.     -The benefits of a healthy diet and exercise were discussed with patient, especially the positive effects they have on mental health. Patient encouraged to consider lifestyle modification regarding  diet and exercise patterns to maximize results of mental health treatment.  -Reviewed previous available documentation  -Reviewed most recent available labs     -Lengthy discussion with patient on the possible side effects of antipsychotic medications including increased cholesterol, increased blood sugar, and possibility of weight gain.  Also discussed the need to monitor lab work associated with this.  The risk of muscle movement disorders with this class of medication was also discussed.     -I've explained to him that drugs of the SSRI class can have side effects such as weight gain, sexual dysfunction, insomnia, headache, nausea. These medications are generally effective at alleviating symptoms of anxiety and/or depression. Let me know if significant side effects do occur.      Visit Diagnoses:    ICD-10-CM ICD-9-CM   1. Chronic post-traumatic stress disorder (PTSD)  F43.12 309.81   2. VANDANA (generalized anxiety disorder)  F41.1 300.02   3. MDD (major depressive disorder), recurrent, in full remission  F33.42 296.36   4. Medication management  Z79.899 V58.69         TREATMENT PLAN/GOALS: Continue supportive psychotherapy efforts and medications as indicated. Treatment and medication options discussed during today's visit. Patient acknowledged and verbally consented to continue with current treatment plan and was educated on the importance of compliance with treatment and follow-up appointments.    MEDICATION ISSUES:  Discussed medication options and treatment plan of prescribed medication as  well as the risks, benefits, and side effects including potential falls, possible impaired driving and metabolic adversities among others. Patient is agreeable to call the office with any worsening of symptoms or onset of side effects. Patient is agreeable to call 911 or go to the nearest ER should he/she begin having SI/HI.     MEDS ORDERED DURING VISIT:  New Medications Ordered This Visit   Medications    sertraline (ZOLOFT) 100 MG tablet     Sig: Take 2 tablets by mouth Daily.     Dispense:  60 tablet     Refill:  2    prazosin (MINIPRESS) 5 MG capsule     Sig: Take 1 capsule by mouth every night at bedtime.     Dispense:  30 capsule     Refill:  2    prazosin (MINIPRESS) 2 MG capsule     Sig: Take 1 capsule by mouth Every Night. Take with Prazosin 5 mg capsule every night     Dispense:  30 capsule     Refill:  2    OLANZapine (zyPREXA) 20 MG tablet     Sig: Take 1 tablet by mouth Every Night. DO NOT TAKE LYBALVI     Dispense:  30 tablet     Refill:  2    mirtazapine (REMERON) 45 MG tablet     Sig: Take 1 tablet by mouth every night at bedtime.     Dispense:  30 tablet     Refill:  2    busPIRone (BUSPAR) 30 MG tablet     Sig: Take 1 tablet by mouth 2 (Two) Times a Day. Monitor for serotonin syndrome as discussed     Dispense:  60 tablet     Refill:  2    benztropine (COGENTIN) 0.5 MG tablet     Sig: Take 1 tablet by mouth Daily.     Dispense:  30 tablet     Refill:  2     -Increase Zoloft 100 mg tablet take 2 tablets by mouth daily for depression  -Continue prazosin 5 mg capsule take 1 capsule by mouth every night at bedtime  -Continue prazosin 2 mg capsule take 1 capsule by mouth every not take with prazosin 5 mg capsule nightly  - Continue Olanzapine 20 mg tablet, take 1 tablet every night stop olanzapine   -Continue mirtazapine 45 mg tablet take 1 tablet by mouth every night  -Continue buspirone 30 mg tablet take 1 tablet by mouth 2 times a day for anxiety     -Continue benztropine 0.5 mg tablet take 1 tablet  daily for tremor     Return in about 3 months (around 10/18/2025).         Prognosis: Guarded dependent on medication/follow up and treatment plan compliance.  Functionality: pt showing improvements in important areas of daily functioning.     Short-term goals: Patient will adhere to medication regimen and note continued improvement in symptoms over the next 3 months.   Long-term goals: Patient will be adherent to medication management and psychotherapy with continued improvement in symptoms over the next 6 months.    I spent 30 minutes caring for Deni on this date of service. This time includes time spent by me in the following activities: preparing for the visit, performing a medically appropriate examination and/or evaluation, counseling and educating the patient/family/caregiver, documenting information in the medical record, and ordering medications    I have personally reviewed this patients note, confirmed and/or updated, this visit as appropriate. History of present illness- interval history, physical examination, assessment and plan, allergies, current medications, past family history, past medical history, past social history, past surgical history and problem list.          This document has been electronically signed by ROSALINE Elam   July 18, 2025 10:46 EDT    Patient or patient representative verbalized consent for the use of Ambient Listening during the visit with  ROSALINE Elam for chart documentation. 7/18/2025  10:36 EDT    Part of this note may be an electronic transcription/translation of spoken language to printed text using the Dragon Dictation System.

## 2025-07-18 ENCOUNTER — OFFICE VISIT (OUTPATIENT)
Dept: PSYCHIATRY | Facility: CLINIC | Age: 55
End: 2025-07-18
Payer: MEDICARE

## 2025-07-18 VITALS
DIASTOLIC BLOOD PRESSURE: 92 MMHG | HEIGHT: 69 IN | OXYGEN SATURATION: 100 % | WEIGHT: 161 LBS | SYSTOLIC BLOOD PRESSURE: 142 MMHG | HEART RATE: 51 BPM | BODY MASS INDEX: 23.85 KG/M2

## 2025-07-18 DIAGNOSIS — F33.42 MDD (MAJOR DEPRESSIVE DISORDER), RECURRENT, IN FULL REMISSION: ICD-10-CM

## 2025-07-18 DIAGNOSIS — F43.12 CHRONIC POST-TRAUMATIC STRESS DISORDER (PTSD): Primary | ICD-10-CM

## 2025-07-18 DIAGNOSIS — Z79.899 MEDICATION MANAGEMENT: ICD-10-CM

## 2025-07-18 DIAGNOSIS — F41.1 GAD (GENERALIZED ANXIETY DISORDER): ICD-10-CM

## 2025-07-18 RX ORDER — OLANZAPINE 20 MG/1
20 TABLET, FILM COATED ORAL NIGHTLY
Qty: 30 TABLET | Refills: 2 | Status: SHIPPED | OUTPATIENT
Start: 2025-07-18

## 2025-07-18 RX ORDER — BENZTROPINE MESYLATE 0.5 MG/1
0.5 TABLET ORAL DAILY
Qty: 30 TABLET | Refills: 2 | Status: SHIPPED | OUTPATIENT
Start: 2025-07-18 | End: 2026-07-18

## 2025-07-18 RX ORDER — BUSPIRONE HYDROCHLORIDE 30 MG/1
30 TABLET ORAL 2 TIMES DAILY
Qty: 60 TABLET | Refills: 2 | Status: SHIPPED | OUTPATIENT
Start: 2025-07-18

## 2025-07-18 RX ORDER — MIRTAZAPINE 45 MG/1
45 TABLET, FILM COATED ORAL
Qty: 30 TABLET | Refills: 2 | Status: SHIPPED | OUTPATIENT
Start: 2025-07-18

## 2025-07-18 RX ORDER — PRAZOSIN HYDROCHLORIDE 2 MG/1
2 CAPSULE ORAL NIGHTLY
Qty: 30 CAPSULE | Refills: 2 | Status: SHIPPED | OUTPATIENT
Start: 2025-07-18

## 2025-07-18 RX ORDER — SERTRALINE HYDROCHLORIDE 100 MG/1
200 TABLET, FILM COATED ORAL DAILY
Qty: 60 TABLET | Refills: 2 | Status: SHIPPED | OUTPATIENT
Start: 2025-07-18

## 2025-07-18 RX ORDER — PRAZOSIN HYDROCHLORIDE 5 MG/1
5 CAPSULE ORAL
Qty: 30 CAPSULE | Refills: 2 | Status: SHIPPED | OUTPATIENT
Start: 2025-07-18